# Patient Record
Sex: MALE | Race: BLACK OR AFRICAN AMERICAN | Employment: OTHER | URBAN - NONMETROPOLITAN AREA
[De-identification: names, ages, dates, MRNs, and addresses within clinical notes are randomized per-mention and may not be internally consistent; named-entity substitution may affect disease eponyms.]

---

## 2017-01-03 RX ORDER — OXYCODONE HYDROCHLORIDE AND ACETAMINOPHEN 5; 325 MG/1; MG/1
TABLET ORAL
Qty: 20 TABLET | Refills: 0 | OUTPATIENT
Start: 2017-01-03

## 2017-01-05 ENCOUNTER — OFFICE VISIT (OUTPATIENT)
Dept: CARDIOLOGY | Age: 52
End: 2017-01-05
Payer: MEDICARE

## 2017-01-05 VITALS
SYSTOLIC BLOOD PRESSURE: 118 MMHG | HEIGHT: 71 IN | BODY MASS INDEX: 44.1 KG/M2 | DIASTOLIC BLOOD PRESSURE: 84 MMHG | WEIGHT: 315 LBS | HEART RATE: 84 BPM

## 2017-01-05 DIAGNOSIS — R94.31 ABNORMAL EKG: Primary | ICD-10-CM

## 2017-01-05 DIAGNOSIS — E11.9 CONTROLLED TYPE 2 DIABETES MELLITUS WITHOUT COMPLICATION, WITHOUT LONG-TERM CURRENT USE OF INSULIN (HCC): ICD-10-CM

## 2017-01-05 DIAGNOSIS — I10 ESSENTIAL HYPERTENSION: ICD-10-CM

## 2017-01-05 DIAGNOSIS — Z01.818 PRE-OP EVALUATION: ICD-10-CM

## 2017-01-05 PROCEDURE — 99214 OFFICE O/P EST MOD 30 MIN: CPT | Performed by: CLINICAL NURSE SPECIALIST

## 2017-01-05 ASSESSMENT — ENCOUNTER SYMPTOMS
VOMITING: 0
SHORTNESS OF BREATH: 0
HEARTBURN: 0
NAUSEA: 0
ABDOMINAL PAIN: 0
BLURRED VISION: 0
ORTHOPNEA: 0
COUGH: 0

## 2017-01-09 ENCOUNTER — HOSPITAL ENCOUNTER (OUTPATIENT)
Dept: NON INVASIVE DIAGNOSTICS | Age: 52
Discharge: HOME | End: 2017-01-09
Payer: MEDICARE

## 2017-01-09 DIAGNOSIS — Z01.818 PRE-OP EVALUATION: ICD-10-CM

## 2017-01-09 DIAGNOSIS — R94.31 ABNORMAL EKG: ICD-10-CM

## 2017-01-09 PROCEDURE — 93350 STRESS TTE ONLY: CPT

## 2017-01-11 ENCOUNTER — TELEPHONE (OUTPATIENT)
Dept: SURGERY | Age: 52
End: 2017-01-11

## 2017-01-27 ENCOUNTER — ANESTHESIA (OUTPATIENT)
Dept: OPERATING ROOM | Age: 52
End: 2017-01-27
Payer: MEDICARE

## 2017-01-27 ENCOUNTER — HOSPITAL ENCOUNTER (OUTPATIENT)
Age: 52
Setting detail: OUTPATIENT SURGERY
Discharge: HOME | End: 2017-01-27
Attending: SURGERY | Admitting: SURGERY
Payer: MEDICARE

## 2017-01-27 ENCOUNTER — ANESTHESIA EVENT (OUTPATIENT)
Dept: OPERATING ROOM | Age: 52
End: 2017-01-27
Payer: MEDICARE

## 2017-01-27 ENCOUNTER — PREP FOR PROCEDURE (OUTPATIENT)
Dept: SURGERY | Age: 52
End: 2017-01-27

## 2017-01-27 VITALS
HEIGHT: 71 IN | BODY MASS INDEX: 43.68 KG/M2 | OXYGEN SATURATION: 100 % | HEART RATE: 88 BPM | TEMPERATURE: 97.8 F | SYSTOLIC BLOOD PRESSURE: 138 MMHG | RESPIRATION RATE: 16 BRPM | WEIGHT: 312 LBS | DIASTOLIC BLOOD PRESSURE: 72 MMHG

## 2017-01-27 VITALS
SYSTOLIC BLOOD PRESSURE: 104 MMHG | OXYGEN SATURATION: 100 % | TEMPERATURE: 97.6 F | DIASTOLIC BLOOD PRESSURE: 38 MMHG | RESPIRATION RATE: 3 BRPM

## 2017-01-27 LAB
GLUCOSE BLD-MCNC: 117 MG/DL (ref 70–99)
PERFORMED ON: ABNORMAL

## 2017-01-27 PROCEDURE — 3700000000 HC ANESTHESIA ATTENDED CARE: Performed by: SURGERY

## 2017-01-27 PROCEDURE — 6360000002 HC RX W HCPCS: Performed by: SURGERY

## 2017-01-27 PROCEDURE — 6370000000 HC RX 637 (ALT 250 FOR IP): Performed by: SURGERY

## 2017-01-27 PROCEDURE — 6360000002 HC RX W HCPCS: Performed by: NURSE ANESTHETIST, CERTIFIED REGISTERED

## 2017-01-27 PROCEDURE — 7100000000 HC PACU RECOVERY - FIRST 15 MIN: Performed by: SURGERY

## 2017-01-27 PROCEDURE — 3600000014 HC SURGERY LEVEL 4 ADDTL 15MIN: Performed by: SURGERY

## 2017-01-27 PROCEDURE — 49585 REPAIR UMBILICAL HERN,5+Y/O,REDUC: CPT | Performed by: SURGERY

## 2017-01-27 PROCEDURE — 2500000003 HC RX 250 WO HCPCS: Performed by: SURGERY

## 2017-01-27 PROCEDURE — 99999 PR OFFICE/OUTPT VISIT,PROCEDURE ONLY: CPT | Performed by: PHYSICIAN ASSISTANT

## 2017-01-27 PROCEDURE — 3700000001 HC ADD 15 MINUTES (ANESTHESIA): Performed by: SURGERY

## 2017-01-27 PROCEDURE — 7100000001 HC PACU RECOVERY - ADDTL 15 MIN: Performed by: SURGERY

## 2017-01-27 PROCEDURE — 3600000004 HC SURGERY LEVEL 4 BASE: Performed by: SURGERY

## 2017-01-27 PROCEDURE — 2720000003 HC MISC SUTURE/STAPLES/RELOADS/ETC: Performed by: SURGERY

## 2017-01-27 PROCEDURE — 2720000001 HC MISC SURG SUPPLY STERILE $51-500: Performed by: SURGERY

## 2017-01-27 PROCEDURE — 2580000003 HC RX 258: Performed by: SURGERY

## 2017-01-27 PROCEDURE — 7100000010 HC PHASE II RECOVERY - FIRST 15 MIN: Performed by: SURGERY

## 2017-01-27 PROCEDURE — 82948 REAGENT STRIP/BLOOD GLUCOSE: CPT

## 2017-01-27 PROCEDURE — 2500000003 HC RX 250 WO HCPCS: Performed by: NURSE ANESTHETIST, CERTIFIED REGISTERED

## 2017-01-27 PROCEDURE — 7100000011 HC PHASE II RECOVERY - ADDTL 15 MIN: Performed by: SURGERY

## 2017-01-27 PROCEDURE — C1781 MESH (IMPLANTABLE): HCPCS | Performed by: SURGERY

## 2017-01-27 DEVICE — MESH SURG W6.4XL6.4CM CIR VENTRAL PTCH FOR TISS SEPARATING: Type: IMPLANTABLE DEVICE | Site: ABDOMEN | Status: FUNCTIONAL

## 2017-01-27 RX ORDER — LIDOCAINE HYDROCHLORIDE 10 MG/ML
INJECTION, SOLUTION EPIDURAL; INFILTRATION; INTRACAUDAL; PERINEURAL PRN
Status: DISCONTINUED | OUTPATIENT
Start: 2017-01-27 | End: 2017-01-27 | Stop reason: SDUPTHER

## 2017-01-27 RX ORDER — MEPERIDINE HYDROCHLORIDE 25 MG/ML
12.5 INJECTION INTRAMUSCULAR; INTRAVENOUS; SUBCUTANEOUS EVERY 5 MIN PRN
Status: DISCONTINUED | OUTPATIENT
Start: 2017-01-27 | End: 2017-01-27 | Stop reason: HOSPADM

## 2017-01-27 RX ORDER — PROPOFOL 10 MG/ML
INJECTION, EMULSION INTRAVENOUS PRN
Status: DISCONTINUED | OUTPATIENT
Start: 2017-01-27 | End: 2017-01-27 | Stop reason: SDUPTHER

## 2017-01-27 RX ORDER — PROMETHAZINE HYDROCHLORIDE 25 MG/ML
6.25 INJECTION, SOLUTION INTRAMUSCULAR; INTRAVENOUS
Status: DISCONTINUED | OUTPATIENT
Start: 2017-01-27 | End: 2017-01-27 | Stop reason: HOSPADM

## 2017-01-27 RX ORDER — FENTANYL CITRATE 50 UG/ML
INJECTION, SOLUTION INTRAMUSCULAR; INTRAVENOUS PRN
Status: DISCONTINUED | OUTPATIENT
Start: 2017-01-27 | End: 2017-01-27 | Stop reason: SDUPTHER

## 2017-01-27 RX ORDER — ROCURONIUM BROMIDE 10 MG/ML
INJECTION, SOLUTION INTRAVENOUS PRN
Status: DISCONTINUED | OUTPATIENT
Start: 2017-01-27 | End: 2017-01-27 | Stop reason: SDUPTHER

## 2017-01-27 RX ORDER — MORPHINE SULFATE 4 MG/ML
2 INJECTION, SOLUTION INTRAMUSCULAR; INTRAVENOUS EVERY 5 MIN PRN
Status: DISCONTINUED | OUTPATIENT
Start: 2017-01-27 | End: 2017-01-27 | Stop reason: HOSPADM

## 2017-01-27 RX ORDER — HYDRALAZINE HYDROCHLORIDE 20 MG/ML
5 INJECTION INTRAMUSCULAR; INTRAVENOUS EVERY 10 MIN PRN
Status: DISCONTINUED | OUTPATIENT
Start: 2017-01-27 | End: 2017-01-27 | Stop reason: HOSPADM

## 2017-01-27 RX ORDER — METOCLOPRAMIDE HYDROCHLORIDE 5 MG/ML
10 INJECTION INTRAMUSCULAR; INTRAVENOUS
Status: DISCONTINUED | OUTPATIENT
Start: 2017-01-27 | End: 2017-01-27 | Stop reason: HOSPADM

## 2017-01-27 RX ORDER — SODIUM CHLORIDE 0.9 % (FLUSH) 0.9 %
10 SYRINGE (ML) INJECTION PRN
Status: DISCONTINUED | OUTPATIENT
Start: 2017-01-27 | End: 2017-01-27 | Stop reason: HOSPADM

## 2017-01-27 RX ORDER — DIPHENHYDRAMINE HYDROCHLORIDE 50 MG/ML
12.5 INJECTION INTRAMUSCULAR; INTRAVENOUS
Status: DISCONTINUED | OUTPATIENT
Start: 2017-01-27 | End: 2017-01-27 | Stop reason: HOSPADM

## 2017-01-27 RX ORDER — ONDANSETRON 2 MG/ML
INJECTION INTRAMUSCULAR; INTRAVENOUS PRN
Status: DISCONTINUED | OUTPATIENT
Start: 2017-01-27 | End: 2017-01-27 | Stop reason: SDUPTHER

## 2017-01-27 RX ORDER — SODIUM CHLORIDE 0.9 % (FLUSH) 0.9 %
10 SYRINGE (ML) INJECTION EVERY 12 HOURS SCHEDULED
Status: DISCONTINUED | OUTPATIENT
Start: 2017-01-27 | End: 2017-01-27 | Stop reason: HOSPADM

## 2017-01-27 RX ORDER — ACETAMINOPHEN 325 MG/1
650 TABLET ORAL EVERY 4 HOURS PRN
Status: DISCONTINUED | OUTPATIENT
Start: 2017-01-27 | End: 2017-01-27 | Stop reason: HOSPADM

## 2017-01-27 RX ORDER — MORPHINE SULFATE 4 MG/ML
4 INJECTION, SOLUTION INTRAMUSCULAR; INTRAVENOUS EVERY 5 MIN PRN
Status: DISCONTINUED | OUTPATIENT
Start: 2017-01-27 | End: 2017-01-27 | Stop reason: HOSPADM

## 2017-01-27 RX ORDER — SODIUM CHLORIDE, SODIUM LACTATE, POTASSIUM CHLORIDE, CALCIUM CHLORIDE 600; 310; 30; 20 MG/100ML; MG/100ML; MG/100ML; MG/100ML
INJECTION, SOLUTION INTRAVENOUS CONTINUOUS
Status: DISCONTINUED | OUTPATIENT
Start: 2017-01-27 | End: 2017-01-27 | Stop reason: HOSPADM

## 2017-01-27 RX ORDER — BUPIVACAINE HYDROCHLORIDE AND EPINEPHRINE 2.5; 5 MG/ML; UG/ML
INJECTION, SOLUTION INFILTRATION; PERINEURAL PRN
Status: DISCONTINUED | OUTPATIENT
Start: 2017-01-27 | End: 2017-01-27 | Stop reason: HOSPADM

## 2017-01-27 RX ORDER — ENALAPRILAT 2.5 MG/2ML
1.25 INJECTION INTRAVENOUS
Status: DISCONTINUED | OUTPATIENT
Start: 2017-01-27 | End: 2017-01-27 | Stop reason: HOSPADM

## 2017-01-27 RX ORDER — LABETALOL HYDROCHLORIDE 5 MG/ML
5 INJECTION, SOLUTION INTRAVENOUS EVERY 10 MIN PRN
Status: DISCONTINUED | OUTPATIENT
Start: 2017-01-27 | End: 2017-01-27 | Stop reason: HOSPADM

## 2017-01-27 RX ORDER — LIDOCAINE HYDROCHLORIDE 10 MG/ML
1 INJECTION, SOLUTION EPIDURAL; INFILTRATION; INTRACAUDAL; PERINEURAL ONCE
Status: COMPLETED | OUTPATIENT
Start: 2017-01-27 | End: 2017-01-27

## 2017-01-27 RX ORDER — OXYCODONE HYDROCHLORIDE AND ACETAMINOPHEN 5; 325 MG/1; MG/1
1 TABLET ORAL EVERY 4 HOURS PRN
Status: DISCONTINUED | OUTPATIENT
Start: 2017-01-27 | End: 2017-01-27 | Stop reason: HOSPADM

## 2017-01-27 RX ORDER — MIDAZOLAM HYDROCHLORIDE 1 MG/ML
INJECTION INTRAMUSCULAR; INTRAVENOUS PRN
Status: DISCONTINUED | OUTPATIENT
Start: 2017-01-27 | End: 2017-01-27 | Stop reason: SDUPTHER

## 2017-01-27 RX ORDER — OXYCODONE HYDROCHLORIDE AND ACETAMINOPHEN 5; 325 MG/1; MG/1
TABLET ORAL
Qty: 30 TABLET | Refills: 0 | Status: SHIPPED | OUTPATIENT
Start: 2017-01-27 | End: 2017-03-16 | Stop reason: SDUPTHER

## 2017-01-27 RX ORDER — OXYCODONE HYDROCHLORIDE AND ACETAMINOPHEN 5; 325 MG/1; MG/1
TABLET ORAL
Status: DISCONTINUED
Start: 2017-01-27 | End: 2017-01-27 | Stop reason: HOSPADM

## 2017-01-27 RX ORDER — OXYCODONE HYDROCHLORIDE AND ACETAMINOPHEN 5; 325 MG/1; MG/1
2 TABLET ORAL EVERY 4 HOURS PRN
Status: DISCONTINUED | OUTPATIENT
Start: 2017-01-27 | End: 2017-01-27 | Stop reason: HOSPADM

## 2017-01-27 RX ADMIN — ROCURONIUM BROMIDE 50 MG: 10 INJECTION INTRAVENOUS at 14:09

## 2017-01-27 RX ADMIN — LIDOCAINE HYDROCHLORIDE 50 MG: 10 INJECTION, SOLUTION EPIDURAL; INFILTRATION; INTRACAUDAL; PERINEURAL at 14:09

## 2017-01-27 RX ADMIN — HYDROMORPHONE HYDROCHLORIDE 0.5 MG: 1 INJECTION, SOLUTION INTRAMUSCULAR; INTRAVENOUS; SUBCUTANEOUS at 16:15

## 2017-01-27 RX ADMIN — ONDANSETRON HYDROCHLORIDE 4 MG: 2 INJECTION, SOLUTION INTRAVENOUS at 15:15

## 2017-01-27 RX ADMIN — SUGAMMADEX 300 MG: 100 INJECTION, SOLUTION INTRAVENOUS at 15:14

## 2017-01-27 RX ADMIN — FENTANYL CITRATE 100 MCG: 50 INJECTION INTRAMUSCULAR; INTRAVENOUS at 15:07

## 2017-01-27 RX ADMIN — HYDROMORPHONE HYDROCHLORIDE 0.5 MG: 1 INJECTION, SOLUTION INTRAMUSCULAR; INTRAVENOUS; SUBCUTANEOUS at 16:23

## 2017-01-27 RX ADMIN — SODIUM CHLORIDE, SODIUM LACTATE, POTASSIUM CHLORIDE, AND CALCIUM CHLORIDE: 600; 310; 30; 20 INJECTION, SOLUTION INTRAVENOUS at 14:40

## 2017-01-27 RX ADMIN — Medication 3 G: at 14:33

## 2017-01-27 RX ADMIN — FENTANYL CITRATE 50 MCG: 50 INJECTION INTRAMUSCULAR; INTRAVENOUS at 14:34

## 2017-01-27 RX ADMIN — LIDOCAINE HYDROCHLORIDE 1 ML: 10 INJECTION, SOLUTION EPIDURAL; INFILTRATION; INTRACAUDAL; PERINEURAL at 09:41

## 2017-01-27 RX ADMIN — PROPOFOL 200 MG: 10 INJECTION, EMULSION INTRAVENOUS at 14:09

## 2017-01-27 RX ADMIN — MIDAZOLAM HYDROCHLORIDE 2 MG: 1 INJECTION, SOLUTION INTRAMUSCULAR; INTRAVENOUS at 14:01

## 2017-01-27 RX ADMIN — FENTANYL CITRATE 100 MCG: 50 INJECTION INTRAMUSCULAR; INTRAVENOUS at 14:09

## 2017-01-27 RX ADMIN — OXYCODONE HYDROCHLORIDE AND ACETAMINOPHEN 2 TABLET: 5; 325 TABLET ORAL at 16:57

## 2017-01-27 RX ADMIN — SODIUM CHLORIDE, SODIUM LACTATE, POTASSIUM CHLORIDE, AND CALCIUM CHLORIDE: 600; 310; 30; 20 INJECTION, SOLUTION INTRAVENOUS at 13:55

## 2017-01-27 RX ADMIN — FENTANYL CITRATE 100 MCG: 50 INJECTION INTRAMUSCULAR; INTRAVENOUS at 14:46

## 2017-01-27 RX ADMIN — SODIUM CHLORIDE, SODIUM LACTATE, POTASSIUM CHLORIDE, AND CALCIUM CHLORIDE: 600; 310; 30; 20 INJECTION, SOLUTION INTRAVENOUS at 09:41

## 2017-01-27 ASSESSMENT — PAIN SCALES - GENERAL
PAINLEVEL_OUTOF10: 10
PAINLEVEL_OUTOF10: 5
PAINLEVEL_OUTOF10: 10
PAINLEVEL_OUTOF10: 9

## 2017-01-27 ASSESSMENT — PAIN DESCRIPTION - PROGRESSION: CLINICAL_PROGRESSION: GRADUALLY IMPROVING

## 2017-01-27 ASSESSMENT — PAIN - FUNCTIONAL ASSESSMENT: PAIN_FUNCTIONAL_ASSESSMENT: 0-10

## 2017-01-27 ASSESSMENT — PAIN DESCRIPTION - LOCATION: LOCATION: ABDOMEN

## 2017-01-27 ASSESSMENT — PAIN DESCRIPTION - PAIN TYPE: TYPE: SURGICAL PAIN

## 2017-02-16 ENCOUNTER — OFFICE VISIT (OUTPATIENT)
Dept: SURGERY | Age: 52
End: 2017-02-16

## 2017-02-16 VITALS
DIASTOLIC BLOOD PRESSURE: 86 MMHG | WEIGHT: 315 LBS | HEIGHT: 71 IN | TEMPERATURE: 98.1 F | BODY MASS INDEX: 44.1 KG/M2 | SYSTOLIC BLOOD PRESSURE: 138 MMHG

## 2017-02-16 DIAGNOSIS — Z87.19 S/P HERNIA REPAIR: Primary | ICD-10-CM

## 2017-02-16 DIAGNOSIS — Z98.890 S/P HERNIA REPAIR: Primary | ICD-10-CM

## 2017-02-16 PROCEDURE — 99024 POSTOP FOLLOW-UP VISIT: CPT | Performed by: SURGERY

## 2017-03-16 ENCOUNTER — OFFICE VISIT (OUTPATIENT)
Dept: SURGERY | Age: 52
End: 2017-03-16

## 2017-03-16 VITALS
TEMPERATURE: 98.1 F | SYSTOLIC BLOOD PRESSURE: 158 MMHG | DIASTOLIC BLOOD PRESSURE: 98 MMHG | BODY MASS INDEX: 44.1 KG/M2 | WEIGHT: 315 LBS | HEIGHT: 71 IN

## 2017-03-16 DIAGNOSIS — R10.30 LOWER ABDOMINAL PAIN: Primary | ICD-10-CM

## 2017-03-16 DIAGNOSIS — Z87.19 S/P HERNIA REPAIR: ICD-10-CM

## 2017-03-16 DIAGNOSIS — Z98.890 S/P HERNIA REPAIR: ICD-10-CM

## 2017-03-16 PROCEDURE — 99024 POSTOP FOLLOW-UP VISIT: CPT | Performed by: SURGERY

## 2017-03-16 RX ORDER — OXYCODONE HYDROCHLORIDE AND ACETAMINOPHEN 5; 325 MG/1; MG/1
TABLET ORAL
Qty: 20 TABLET | Refills: 0 | Status: SHIPPED | OUTPATIENT
Start: 2017-03-16 | End: 2017-10-05

## 2017-03-29 ENCOUNTER — TELEPHONE (OUTPATIENT)
Dept: SURGERY | Age: 52
End: 2017-03-29

## 2017-06-27 RX ORDER — CLONIDINE HYDROCHLORIDE 0.3 MG/1
TABLET ORAL
Qty: 180 TABLET | Refills: 5 | Status: SHIPPED | OUTPATIENT
Start: 2017-06-27

## 2017-09-19 ENCOUNTER — TELEPHONE (OUTPATIENT)
Dept: FAMILY MEDICINE CLINIC | Age: 52
End: 2017-09-19

## 2017-10-05 ENCOUNTER — OFFICE VISIT (OUTPATIENT)
Dept: FAMILY MEDICINE CLINIC | Age: 52
End: 2017-10-05
Payer: MEDICARE

## 2017-10-05 VITALS
HEART RATE: 77 BPM | BODY MASS INDEX: 44.1 KG/M2 | OXYGEN SATURATION: 97 % | WEIGHT: 315 LBS | SYSTOLIC BLOOD PRESSURE: 120 MMHG | HEIGHT: 71 IN | DIASTOLIC BLOOD PRESSURE: 88 MMHG

## 2017-10-05 DIAGNOSIS — Z00.00 ROUTINE GENERAL MEDICAL EXAMINATION AT A HEALTH CARE FACILITY: ICD-10-CM

## 2017-10-05 DIAGNOSIS — E66.01 MORBID OBESITY WITH BMI OF 40.0-44.9, ADULT (HCC): ICD-10-CM

## 2017-10-05 DIAGNOSIS — Z23 NEED FOR PNEUMOCOCCAL VACCINATION: Primary | ICD-10-CM

## 2017-10-05 PROCEDURE — G0438 PPPS, INITIAL VISIT: HCPCS | Performed by: NURSE PRACTITIONER

## 2017-10-05 PROCEDURE — G0009 ADMIN PNEUMOCOCCAL VACCINE: HCPCS | Performed by: NURSE PRACTITIONER

## 2017-10-05 PROCEDURE — 90732 PPSV23 VACC 2 YRS+ SUBQ/IM: CPT | Performed by: NURSE PRACTITIONER

## 2017-10-05 ASSESSMENT — ANXIETY QUESTIONNAIRES: GAD7 TOTAL SCORE: 5

## 2017-10-05 ASSESSMENT — LIFESTYLE VARIABLES: HOW OFTEN DO YOU HAVE A DRINK CONTAINING ALCOHOL: 0

## 2017-10-05 NOTE — PROGRESS NOTES
Medicare Annual Wellness Visit  Name: Mallory Shoemaker Date: 10/5/2017   MRN: [id number] Sex: Male   Age: 50 y.o. Ethnicity: Non-/Non    : 1965 Race: Ty Acevedo is here for Medicare AWV    Screenings for behavioral, psychosocial and functional/safety risks, and cognitive dysfunction are all negative except as indicated below. These results, as well as other patient data from the [address] form, are documented in Flowsheets linked to this Encounter. Allergies   Allergen Reactions    Lortab [Hydrocodone-Acetaminophen] Itching     Prior to Visit Medications    Medication Sig Taking?  Authorizing Provider   cloNIDine (CATAPRES) 0.3 MG tablet TAKE THREE TABLETS BY MOUTH TWICE A DAY Yes WILMAN Swenson   ALPRAZolam (XANAX) 1 MG tablet Take 1 mg by mouth nightly as needed for Sleep Yes Historical Provider, MD   amitriptyline (ELAVIL) 50 MG tablet Take 50 mg by mouth nightly Yes Historical Provider, MD   colchicine (COLCRYS) 0.6 MG tablet Take 0.6 mg by mouth daily as needed Indications: Gout  Yes Historical Provider, MD   diclofenac (VOLTAREN) 75 MG EC tablet Take 75 mg by mouth 2 times daily Indications: Arthritis  Yes Historical Provider, MD   DULoxetine (CYMBALTA) 60 MG extended release capsule Take 60 mg by mouth daily Indications: Depression  Yes Historical Provider, MD   hydrOXYzine (VISTARIL) 25 MG capsule Take 25 mg by mouth 3 times daily as needed for Itching Yes Historical Provider, MD   metFORMIN (GLUCOPHAGE) 1000 MG tablet Take 1,000 mg by mouth 2 times daily (with meals) Indications: Diabetes  Yes Historical Provider, MD   prazosin (MINIPRESS) 2 MG capsule Take 2 mg by mouth nightly Indications: Frightening Dreams  Yes Historical Provider, MD   traZODone (DESYREL) 100 MG tablet Take 100 mg by mouth nightly Indications: Disturbed Sleep  Yes Historical Provider, MD   atenolol (TENORMIN) 100 MG tablet Take 1 tablet by mouth 2 times daily NEEDS APPOINTMENT  Patient taking differently: Take 100 mg by mouth 2 times daily Indications: High Blood Pressure NEEDS APPOINTMENT Yes Amanda Arceo MD   amLODIPine (NORVASC) 5 MG tablet TAKE ONE TABLET BY MOUTH EVERY DAY . .......... Ozzy Ryan MD   simvastatin (ZOCOR) 40 MG tablet Take 1 tablet by mouth every evening. Patient taking differently: Take 40 mg by mouth every evening Indications: Changes in Cholesterol  Yes WILMAN Moreno   glucose monitoring kit (FREESTYLE) monitoring kit 1 kit by Does not apply route daily as needed. DX:250.00 Yes WILMAN Moreno   glucose blood VI test strips (EXACTECH TEST) strip 1 each by In Vitro route daily. Test sugars daily and prn DX:250.00 Yes WILMAN Moreno   Lancets MISC DX: 250.00  Test sugar daily and prn Yes WILMAN Moreno   ibuprofen (ADVIL;MOTRIN) 800 MG tablet TAKE ONE TABLET BY MOUTH EVERY 8 HOURS AS NEEDED FOR PAIN  WILMAN Moreno   metFORMIN (GLUCOPHAGE) 500 MG tablet Take 1 tablet by mouth 2 times daily for 30 days.   WILMAN Moreno     Past Medical History:   Diagnosis Date    Anxiety     Arthritis     Chronic back pain     Controlled type 2 diabetes mellitus without complication, without long-term current use of insulin ([address] 1/5/2017    Depression     Diabetes mellitus ([address]     Gout     Hyperlipidemia     Hypertension     Obesity     Umbilical hernia      Past Surgical History:   Procedure Laterality Date    BACK SURGERY      COLONOSCOPY      LUMBAR [address] SURGERY  2008    OTHER SURGICAL HISTORY      cat scratch fever in left groin    REPAIR UMBILICAL NTZI,9+K/C,LIIZH N/A 1/75/7421    HERNIA UMBILICAL REPAIR WITH MESH  performed by [facility]yer [facility] at [facility]    UPPER GASTROINTESTINAL ENDOSCOPY       Family History   Problem Relation Age of Onset    Diabetes Mother     High Blood Pressure Mother     Cancer Father        CareTeam (Including had an eye exam within the past year?: Yes  Hearing/Vision Interventions:  · None indicated    Safety  Do you have working smoke detectors?: Yes  Have all throw rugs been removed or fastened?: (!) No  Do you have non-slip mats in all bathtubs?: Yes  Do all of your stairways have a railing or banister?: Yes  Are your doorways, halls and stairs free of clutter?: Yes  Do you always fasten your seatbelt when you are in a car?: Yes  Safety Interventions:  · None indicated    ADLs  In the past 7 days, did you need help from others to perform any of the following everyday activities?: None  In the past 7 days, did you need help from others to take care of any of the following?: None  ADL Interventions:  · None indicated    Personalized Preventive Plan   Current Health Maintenance Status  Immunization History   Administered Date(s) Administered    Pneumococcal Polysaccharide ([id number]) 10/05/2017        Health Maintenance   Topic Date Due    Diabetic foot exam  09/30/1977    Diabetic retinal exam  09/30/1977    HIV screen  09/30/1982    DTaP/Tdap/Td vaccine (1 - Tdap) 09/30/1986    Diabetic microalbuminuria test  02/09/2012    Flu vaccine (1) 09/01/2017    Colon cancer screen colonoscopy  09/30/2017    Diabetic hemoglobin A1C test  12/12/2017    Lipid screen  12/12/2017    Pneumococcal med risk  Completed     Recommendations for Preventive Services Due: see orders.   Recommended screening schedule for the next 5-10 years is provided to the patient in written form: see Patient Instructions/AVS.

## 2017-10-05 NOTE — PATIENT INSTRUCTIONS
Personalized Preventive Plan for Dionna Thurman - 10/5/2017  Medicare offers a range of preventive health benefits. Some of the tests and screenings are paid in full while other may be subject to a deductible, co-insurance, and/or copay. Some of these benefits include a comprehensive review of your medical history including lifestyle, illnesses that may run in your family, and various assessments and screenings as appropriate. After reviewing your medical record and screening and assessments performed today your provider may have ordered immunizations, labs, imaging, and/or referrals for you. A list of these orders (if applicable) as well as your Preventive Care list are included within your After Visit Summary for your review. Other Preventive Recommendations:    · A preventive eye exam performed by an eye specialist is recommended every 1-2 years to screen for glaucoma; cataracts, macular degeneration, and other eye disorders. · A preventive dental visit is recommended every 6 months. · Try to get at least 150 minutes of exercise per week or 10,000 steps per day on a pedometer . · Order or download the FREE \"Exercise & Physical Activity: Your Everyday Guide\" from [employer]. Call 7-619.338.8463 or search [employer] online. · You need 4014-6505 mg of calcium and 4069-9363 IU of vitamin D per day. It is possible to meet your calcium requirement with diet alone, but a vitamin D supplement is usually necessary to meet this goal.  · When exposed to the sun, use a sunscreen that protects against both UVA and UVB radiation with an SPF of 30 or greater. Reapply every 2 to 3 hours or after sweating, drying off with a towel, or swimming. · Always wear a seat belt when traveling in a car. Always wear a helmet when riding a bicycle or motorcycle.

## 2018-06-15 ENCOUNTER — HOSPITAL ENCOUNTER (EMERGENCY)
Age: 53
Discharge: HOME | End: 2018-06-16
Payer: MEDICARE

## 2018-06-15 VITALS
OXYGEN SATURATION: 95 % | HEIGHT: 71 IN | HEART RATE: 80 BPM | TEMPERATURE: 98.1 F | DIASTOLIC BLOOD PRESSURE: 102 MMHG | BODY MASS INDEX: 44.1 KG/M2 | WEIGHT: 315 LBS | RESPIRATION RATE: 18 BRPM | SYSTOLIC BLOOD PRESSURE: 181 MMHG

## 2018-06-15 DIAGNOSIS — K02.9 PAIN DUE TO DENTAL CARIES: Primary | ICD-10-CM

## 2018-06-15 PROCEDURE — 99282 EMERGENCY DEPT VISIT SF MDM: CPT

## 2018-06-15 ASSESSMENT — PAIN DESCRIPTION - ORIENTATION: ORIENTATION: RIGHT;LOWER

## 2018-06-15 ASSESSMENT — PAIN SCALES - GENERAL: PAINLEVEL_OUTOF10: 8

## 2018-06-15 ASSESSMENT — PAIN DESCRIPTION - LOCATION: LOCATION: TEETH

## 2018-06-16 PROCEDURE — 99283 EMERGENCY DEPT VISIT LOW MDM: CPT | Performed by: NURSE PRACTITIONER

## 2018-06-16 RX ORDER — OXYCODONE HYDROCHLORIDE AND ACETAMINOPHEN 5; 325 MG/1; MG/1
1 TABLET ORAL EVERY 6 HOURS PRN
Qty: 12 TABLET | Refills: 0 | Status: SHIPPED | OUTPATIENT
Start: 2018-06-15 | End: 2018-06-18

## 2018-06-16 RX ORDER — AMOXICILLIN 500 MG/1
500 CAPSULE ORAL 3 TIMES DAILY
Qty: 30 CAPSULE | Refills: 0 | Status: SHIPPED | OUTPATIENT
Start: 2018-06-15 | End: 2018-06-25

## 2018-06-16 ASSESSMENT — ENCOUNTER SYMPTOMS
FACIAL SWELLING: 0
VOMITING: 0
TRISMUS: 0

## 2019-02-28 ENCOUNTER — APPOINTMENT (OUTPATIENT)
Dept: GENERAL RADIOLOGY | Age: 54
End: 2019-02-28
Payer: MEDICARE

## 2019-02-28 ENCOUNTER — HOSPITAL ENCOUNTER (EMERGENCY)
Age: 54
Discharge: HOME | End: 2019-03-01
Attending: EMERGENCY MEDICINE
Payer: MEDICARE

## 2019-02-28 DIAGNOSIS — R07.9 CHEST PAIN, UNSPECIFIED TYPE: Primary | ICD-10-CM

## 2019-02-28 LAB
ALBUMIN SERPL-MCNC: 4.4 G/DL (ref 3.5–5.2)
ALP BLD-CCNC: 126 U/L (ref 40–130)
ALT SERPL-CCNC: 34 U/L (ref 5–41)
ANION GAP SERPL CALCULATED.3IONS-SCNC: 15 MMOL/L (ref 7–19)
APTT: 26.3 SEC (ref 26–36.2)
AST SERPL-CCNC: 40 U/L (ref 5–40)
BASOPHILS ABSOLUTE: 0 K/UL (ref 0–0.2)
BASOPHILS RELATIVE PERCENT: 0.2 % (ref 0–1)
BILIRUB SERPL-MCNC: 0.3 MG/DL (ref 0.2–1.2)
BUN BLDV-MCNC: 21 MG/DL (ref 6–20)
CALCIUM SERPL-MCNC: 9.6 MG/DL (ref 8.6–10)
CHLORIDE BLD-SCNC: 97 MMOL/L (ref 98–111)
CO2: 26 MMOL/L (ref 22–29)
CREAT SERPL-MCNC: 1.3 MG/DL (ref 0.5–1.2)
EOSINOPHILS ABSOLUTE: 0.1 K/UL (ref 0–0.6)
EOSINOPHILS RELATIVE PERCENT: 1.6 % (ref 0–5)
GFR NON-AFRICAN AMERICAN: 58
GLUCOSE BLD-MCNC: 95 MG/DL (ref 74–109)
HCT VFR BLD CALC: 44.9 % (ref 42–52)
HEMOGLOBIN: 14.6 G/DL (ref 14–18)
INR BLD: 1.01 (ref 0.88–1.18)
LYMPHOCYTES ABSOLUTE: 1.1 K/UL (ref 1.1–4.5)
LYMPHOCYTES RELATIVE PERCENT: 20.5 % (ref 20–40)
MCH RBC QN AUTO: 28 PG (ref 27–31)
MCHC RBC AUTO-ENTMCNC: 32.5 G/DL (ref 33–37)
MCV RBC AUTO: 86.2 FL (ref 80–94)
MONOCYTES ABSOLUTE: 0.4 K/UL (ref 0–0.9)
MONOCYTES RELATIVE PERCENT: 7.2 % (ref 0–10)
NEUTROPHILS ABSOLUTE: 3.9 K/UL (ref 1.5–7.5)
NEUTROPHILS RELATIVE PERCENT: 70.3 % (ref 50–65)
PDW BLD-RTO: 15.1 % (ref 11.5–14.5)
PLATELET # BLD: 224 K/UL (ref 130–400)
PMV BLD AUTO: 9.9 FL (ref 9.4–12.4)
POTASSIUM SERPL-SCNC: 4 MMOL/L (ref 3.5–5)
PROTHROMBIN TIME: 12.7 SEC (ref 12–14.6)
RBC # BLD: 5.21 M/UL (ref 4.7–6.1)
SODIUM BLD-SCNC: 138 MMOL/L (ref 136–145)
TOTAL CK: 426 U/L (ref 39–308)
TOTAL PROTEIN: 8.1 G/DL (ref 6.6–8.7)
TROPONIN: <0.01 NG/ML (ref 0–0.03)
WBC # BLD: 5.5 K/UL (ref 4.8–10.8)

## 2019-02-28 PROCEDURE — 85610 PROTHROMBIN TIME: CPT

## 2019-02-28 PROCEDURE — 80053 COMPREHEN METABOLIC PANEL: CPT

## 2019-02-28 PROCEDURE — 71046 X-RAY EXAM CHEST 2 VIEWS: CPT

## 2019-02-28 PROCEDURE — 2580000003 HC RX 258: Performed by: EMERGENCY MEDICINE

## 2019-02-28 PROCEDURE — 93005 ELECTROCARDIOGRAM TRACING: CPT

## 2019-02-28 PROCEDURE — 99285 EMERGENCY DEPT VISIT HI MDM: CPT

## 2019-02-28 PROCEDURE — 85730 THROMBOPLASTIN TIME PARTIAL: CPT

## 2019-02-28 PROCEDURE — 36415 COLL VENOUS BLD VENIPUNCTURE: CPT

## 2019-02-28 PROCEDURE — 84484 ASSAY OF TROPONIN QUANT: CPT

## 2019-02-28 PROCEDURE — 82550 ASSAY OF CK (CPK): CPT

## 2019-02-28 PROCEDURE — 85025 COMPLETE CBC W/AUTO DIFF WBC: CPT

## 2019-02-28 PROCEDURE — 6370000000 HC RX 637 (ALT 250 FOR IP): Performed by: EMERGENCY MEDICINE

## 2019-02-28 RX ORDER — SODIUM CHLORIDE 9 MG/ML
INJECTION, SOLUTION INTRAVENOUS CONTINUOUS
Status: DISCONTINUED | OUTPATIENT
Start: 2019-02-28 | End: 2019-03-01 | Stop reason: HOSPADM

## 2019-02-28 RX ORDER — ASPIRIN 325 MG
325 TABLET ORAL ONCE
Status: COMPLETED | OUTPATIENT
Start: 2019-02-28 | End: 2019-02-28

## 2019-02-28 RX ORDER — NITROGLYCERIN 0.4 MG/1
0.4 TABLET SUBLINGUAL EVERY 5 MIN PRN
Status: DISCONTINUED | OUTPATIENT
Start: 2019-02-28 | End: 2019-03-01 | Stop reason: HOSPADM

## 2019-02-28 RX ADMIN — SODIUM CHLORIDE: 9 INJECTION, SOLUTION INTRAVENOUS at 22:08

## 2019-02-28 RX ADMIN — ASPIRIN 325 MG ORAL TABLET 325 MG: 325 PILL ORAL at 22:08

## 2019-02-28 RX ADMIN — NITROGLYCERIN 0.4 MG: 0.4 TABLET, ORALLY DISINTEGRATING SUBLINGUAL at 22:08

## 2019-02-28 RX ADMIN — NITROGLYCERIN 0.4 MG: 0.4 TABLET, ORALLY DISINTEGRATING SUBLINGUAL at 22:28

## 2019-02-28 ASSESSMENT — PAIN DESCRIPTION - DIRECTION: RADIATING_TOWARDS: BACK

## 2019-02-28 ASSESSMENT — PAIN DESCRIPTION - ORIENTATION: ORIENTATION: LEFT

## 2019-02-28 ASSESSMENT — PAIN DESCRIPTION - LOCATION: LOCATION: CHEST

## 2019-02-28 ASSESSMENT — PAIN DESCRIPTION - PAIN TYPE: TYPE: ACUTE PAIN

## 2019-02-28 ASSESSMENT — PAIN SCALES - GENERAL: PAINLEVEL_OUTOF10: 6

## 2019-03-01 VITALS
HEART RATE: 81 BPM | SYSTOLIC BLOOD PRESSURE: 146 MMHG | DIASTOLIC BLOOD PRESSURE: 101 MMHG | RESPIRATION RATE: 10 BRPM | TEMPERATURE: 98.1 F | OXYGEN SATURATION: 97 %

## 2019-03-01 LAB
EKG P AXIS: 34 DEGREES
EKG P AXIS: 53 DEGREES
EKG P-R INTERVAL: 186 MS
EKG P-R INTERVAL: 188 MS
EKG Q-T INTERVAL: 394 MS
EKG Q-T INTERVAL: 400 MS
EKG QRS DURATION: 90 MS
EKG QRS DURATION: 92 MS
EKG QTC CALCULATION (BAZETT): 424 MS
EKG QTC CALCULATION (BAZETT): 425 MS
EKG T AXIS: 18 DEGREES
EKG T AXIS: 24 DEGREES
TROPONIN: <0.01 NG/ML (ref 0–0.03)

## 2019-03-01 PROCEDURE — 99285 EMERGENCY DEPT VISIT HI MDM: CPT | Performed by: EMERGENCY MEDICINE

## 2019-03-01 PROCEDURE — 84484 ASSAY OF TROPONIN QUANT: CPT

## 2019-03-01 PROCEDURE — 36415 COLL VENOUS BLD VENIPUNCTURE: CPT

## 2019-03-01 ASSESSMENT — ENCOUNTER SYMPTOMS
SINUS PRESSURE: 0
COLOR CHANGE: 0
DIARRHEA: 0
VOMITING: 1
PHOTOPHOBIA: 0
CONSTIPATION: 0
SHORTNESS OF BREATH: 1
BACK PAIN: 0
WHEEZING: 0
EYE PAIN: 0
NAUSEA: 1
CHEST TIGHTNESS: 0
ABDOMINAL PAIN: 0
TROUBLE SWALLOWING: 0

## 2019-05-13 ENCOUNTER — HOSPITAL ENCOUNTER (OUTPATIENT)
Age: 54
Setting detail: SPECIMEN
Discharge: HOME | End: 2019-05-13
Payer: COMMERCIAL

## 2019-05-13 ENCOUNTER — APPOINTMENT (OUTPATIENT)
Dept: GENERAL RADIOLOGY | Age: 54
End: 2019-05-13
Payer: MEDICARE

## 2019-05-13 ENCOUNTER — HOSPITAL ENCOUNTER (EMERGENCY)
Age: 54
Discharge: HOME | End: 2019-05-13
Payer: MEDICARE

## 2019-05-13 VITALS
OXYGEN SATURATION: 94 % | SYSTOLIC BLOOD PRESSURE: 170 MMHG | RESPIRATION RATE: 16 BRPM | DIASTOLIC BLOOD PRESSURE: 92 MMHG | WEIGHT: 270 LBS | BODY MASS INDEX: 37.8 KG/M2 | HEIGHT: 71 IN | HEART RATE: 85 BPM | TEMPERATURE: 98.1 F

## 2019-05-13 DIAGNOSIS — M25.561 ACUTE PAIN OF RIGHT KNEE: ICD-10-CM

## 2019-05-13 DIAGNOSIS — M25.461 KNEE EFFUSION, RIGHT: Primary | ICD-10-CM

## 2019-05-13 DIAGNOSIS — Z87.39 HISTORY OF ACUTE GOUTY ARTHRITIS: ICD-10-CM

## 2019-05-13 LAB
ALBUMIN SERPL-MCNC: 4.6 G/DL (ref 3.5–5.2)
ALP BLD-CCNC: 116 U/L (ref 40–130)
ALT SERPL-CCNC: 19 U/L (ref 5–41)
ANION GAP SERPL CALCULATED.3IONS-SCNC: 11 MMOL/L (ref 7–19)
APPEARANCE FLUID: NORMAL
AST SERPL-CCNC: 21 U/L (ref 5–40)
BASOPHILS ABSOLUTE: 0 K/UL (ref 0–0.2)
BASOPHILS RELATIVE PERCENT: 0.3 % (ref 0–1)
BILIRUB SERPL-MCNC: <0.2 MG/DL (ref 0.2–1.2)
BUN BLDV-MCNC: 14 MG/DL (ref 6–20)
C-REACTIVE PROTEIN: 1.36 MG/DL (ref 0–0.5)
CALCIUM SERPL-MCNC: 9.5 MG/DL (ref 8.6–10)
CELL COUNT FLUID TYPE: NORMAL
CHLORIDE BLD-SCNC: 100 MMOL/L (ref 98–111)
CLOT EVALUATION: NORMAL
CO2: 26 MMOL/L (ref 22–29)
COLOR FLUID: NORMAL
CREAT SERPL-MCNC: 1 MG/DL (ref 0.5–1.2)
EOSINOPHILS ABSOLUTE: 0.1 K/UL (ref 0–0.6)
EOSINOPHILS RELATIVE PERCENT: 1.3 % (ref 0–5)
GFR NON-AFRICAN AMERICAN: >60
GLUCOSE BLD-MCNC: 111 MG/DL (ref 74–109)
HCT VFR BLD CALC: 42.3 % (ref 42–52)
HEMOGLOBIN: 14 G/DL (ref 14–18)
LYMPHOCYTES ABSOLUTE: 1.6 K/UL (ref 1.1–4.5)
LYMPHOCYTES RELATIVE PERCENT: 18.9 % (ref 20–40)
LYMPHOCYTES, BODY FLUID: 18 %
MCH RBC QN AUTO: 29.2 PG (ref 27–31)
MCHC RBC AUTO-ENTMCNC: 33.1 G/DL (ref 33–37)
MCV RBC AUTO: 88.3 FL (ref 80–94)
MONOCYTE, FLUID: 2 %
MONOCYTES ABSOLUTE: 0.3 K/UL (ref 0–0.9)
MONOCYTES RELATIVE PERCENT: 3.4 % (ref 0–10)
NEUTROPHIL, FLUID: 81 %
NEUTROPHILS ABSOLUTE: 6.6 K/UL (ref 1.5–7.5)
NEUTROPHILS RELATIVE PERCENT: 75.9 % (ref 50–65)
NUCLEATED CELLS FLUID: 1576 /CUMM
NUMBER OF CELLS COUNTED FLUID: 100
PDW BLD-RTO: 16.1 % (ref 11.5–14.5)
PLATELET # BLD: 245 K/UL (ref 130–400)
PMV BLD AUTO: 9.5 FL (ref 9.4–12.4)
POTASSIUM SERPL-SCNC: 4.5 MMOL/L (ref 3.5–5)
RBC # BLD: 4.79 M/UL (ref 4.7–6.1)
RBC FLUID: 858 /CUMM
SEDIMENTATION RATE, ERYTHROCYTE: 12 MM/HR (ref 0–15)
SODIUM BLD-SCNC: 137 MMOL/L (ref 136–145)
TOTAL PROTEIN: 8.1 G/DL (ref 6.6–8.7)
URIC ACID, SERUM: 8.7 MG/DL (ref 3.4–7)
WBC # BLD: 8.6 K/UL (ref 4.8–10.8)

## 2019-05-13 PROCEDURE — 87015 SPECIMEN INFECT AGNT CONCNTJ: CPT

## 2019-05-13 PROCEDURE — 87205 SMEAR GRAM STAIN: CPT

## 2019-05-13 PROCEDURE — 96375 TX/PRO/DX INJ NEW DRUG ADDON: CPT

## 2019-05-13 PROCEDURE — 6360000002 HC RX W HCPCS: Performed by: NURSE PRACTITIONER

## 2019-05-13 PROCEDURE — 93971 EXTREMITY STUDY: CPT

## 2019-05-13 PROCEDURE — 36415 COLL VENOUS BLD VENIPUNCTURE: CPT

## 2019-05-13 PROCEDURE — 89051 BODY FLUID CELL COUNT: CPT

## 2019-05-13 PROCEDURE — 73560 X-RAY EXAM OF KNEE 1 OR 2: CPT

## 2019-05-13 PROCEDURE — 87070 CULTURE OTHR SPECIMN AEROBIC: CPT

## 2019-05-13 PROCEDURE — 2500000003 HC RX 250 WO HCPCS: Performed by: NURSE PRACTITIONER

## 2019-05-13 PROCEDURE — 84550 ASSAY OF BLOOD/URIC ACID: CPT

## 2019-05-13 PROCEDURE — 89060 EXAM SYNOVIAL FLUID CRYSTALS: CPT

## 2019-05-13 PROCEDURE — 6370000000 HC RX 637 (ALT 250 FOR IP): Performed by: NURSE PRACTITIONER

## 2019-05-13 PROCEDURE — 80053 COMPREHEN METABOLIC PANEL: CPT

## 2019-05-13 PROCEDURE — 20610 DRAIN/INJ JOINT/BURSA W/O US: CPT

## 2019-05-13 PROCEDURE — 99284 EMERGENCY DEPT VISIT MOD MDM: CPT

## 2019-05-13 PROCEDURE — 96372 THER/PROPH/DIAG INJ SC/IM: CPT

## 2019-05-13 PROCEDURE — 96376 TX/PRO/DX INJ SAME DRUG ADON: CPT

## 2019-05-13 PROCEDURE — 85025 COMPLETE CBC W/AUTO DIFF WBC: CPT

## 2019-05-13 PROCEDURE — 20610 DRAIN/INJ JOINT/BURSA W/O US: CPT | Performed by: NURSE PRACTITIONER

## 2019-05-13 PROCEDURE — 96374 THER/PROPH/DIAG INJ IV PUSH: CPT

## 2019-05-13 PROCEDURE — 86140 C-REACTIVE PROTEIN: CPT

## 2019-05-13 PROCEDURE — 87075 CULTR BACTERIA EXCEPT BLOOD: CPT

## 2019-05-13 PROCEDURE — 85652 RBC SED RATE AUTOMATED: CPT

## 2019-05-13 PROCEDURE — 99283 EMERGENCY DEPT VISIT LOW MDM: CPT | Performed by: NURSE PRACTITIONER

## 2019-05-13 RX ORDER — MORPHINE SULFATE 2 MG/ML
4 INJECTION, SOLUTION INTRAMUSCULAR; INTRAVENOUS ONCE
Status: COMPLETED | OUTPATIENT
Start: 2019-05-13 | End: 2019-05-13

## 2019-05-13 RX ORDER — ORPHENADRINE CITRATE 30 MG/ML
60 INJECTION INTRAMUSCULAR; INTRAVENOUS ONCE
Status: DISCONTINUED | OUTPATIENT
Start: 2019-05-13 | End: 2019-05-13

## 2019-05-13 RX ORDER — LIDOCAINE HYDROCHLORIDE 10 MG/ML
5 INJECTION, SOLUTION EPIDURAL; INFILTRATION; INTRACAUDAL; PERINEURAL ONCE
Status: COMPLETED | OUTPATIENT
Start: 2019-05-13 | End: 2019-05-13

## 2019-05-13 RX ORDER — PROMETHAZINE HYDROCHLORIDE 25 MG/ML
6.25 INJECTION, SOLUTION INTRAMUSCULAR; INTRAVENOUS ONCE
Status: COMPLETED | OUTPATIENT
Start: 2019-05-13 | End: 2019-05-13

## 2019-05-13 RX ORDER — BUPIVACAINE HYDROCHLORIDE 5 MG/ML
30 INJECTION, SOLUTION EPIDURAL; INTRACAUDAL ONCE
Status: COMPLETED | OUTPATIENT
Start: 2019-05-13 | End: 2019-05-13

## 2019-05-13 RX ORDER — OXYCODONE AND ACETAMINOPHEN 10; 325 MG/1; MG/1
1 TABLET ORAL ONCE
Status: COMPLETED | OUTPATIENT
Start: 2019-05-13 | End: 2019-05-13

## 2019-05-13 RX ORDER — OXYCODONE AND ACETAMINOPHEN 10; 325 MG/1; MG/1
1 TABLET ORAL EVERY 6 HOURS PRN
Qty: 12 TABLET | Refills: 0 | Status: SHIPPED | OUTPATIENT
Start: 2019-05-13 | End: 2019-05-16

## 2019-05-13 RX ORDER — DEXAMETHASONE SODIUM PHOSPHATE 10 MG/ML
10 INJECTION, SOLUTION INTRAMUSCULAR; INTRAVENOUS ONCE
Status: COMPLETED | OUTPATIENT
Start: 2019-05-13 | End: 2019-05-13

## 2019-05-13 RX ORDER — MORPHINE SULFATE 10 MG/ML
4 INJECTION, SOLUTION INTRAMUSCULAR; INTRAVENOUS ONCE
Status: DISCONTINUED | OUTPATIENT
Start: 2019-05-13 | End: 2019-05-13

## 2019-05-13 RX ADMIN — PROMETHAZINE HYDROCHLORIDE 6.25 MG: 25 INJECTION INTRAMUSCULAR; INTRAVENOUS at 17:32

## 2019-05-13 RX ADMIN — DEXAMETHASONE SODIUM PHOSPHATE 10 MG: 10 INJECTION INTRAMUSCULAR; INTRAVENOUS at 15:04

## 2019-05-13 RX ADMIN — LIDOCAINE HYDROCHLORIDE 5 ML: 10 INJECTION, SOLUTION EPIDURAL; INFILTRATION; INTRACAUDAL at 17:30

## 2019-05-13 RX ADMIN — MORPHINE SULFATE 4 MG: 2 INJECTION, SOLUTION INTRAMUSCULAR; INTRAVENOUS at 17:32

## 2019-05-13 RX ADMIN — MORPHINE SULFATE 4 MG: 2 INJECTION, SOLUTION INTRAMUSCULAR; INTRAVENOUS at 19:49

## 2019-05-13 RX ADMIN — PROMETHAZINE HYDROCHLORIDE 6.25 MG: 25 INJECTION INTRAMUSCULAR; INTRAVENOUS at 19:49

## 2019-05-13 RX ADMIN — OXYCODONE AND ACETAMINOPHEN 1 TABLET: 10; 325 TABLET ORAL at 15:04

## 2019-05-13 RX ADMIN — BUPIVACAINE HYDROCHLORIDE 150 MG: 5 INJECTION, SOLUTION EPIDURAL; INTRACAUDAL; PERINEURAL at 17:30

## 2019-05-13 ASSESSMENT — PAIN SCALES - GENERAL
PAINLEVEL_OUTOF10: 10
PAINLEVEL_OUTOF10: 5
PAINLEVEL_OUTOF10: 10
PAINLEVEL_OUTOF10: 9
PAINLEVEL_OUTOF10: 10
PAINLEVEL_OUTOF10: 9

## 2019-05-13 ASSESSMENT — ENCOUNTER SYMPTOMS: BACK PAIN: 0

## 2019-05-13 NOTE — ED PROVIDER NOTES
 Hyperlipidemia     Hypertension     Obesity     Umbilical hernia          SURGICAL HISTORY       Past Surgical History:   Procedure Laterality Date    BACK SURGERY      COLONOSCOPY      LUMBAR [address] SURGERY  2008    OTHER SURGICAL HISTORY      cat scratch fever in left groin    REPAIR UMBILICAL ODYD,2+U/Q,UFZUV N/A 5/53/4966    HERNIA UMBILICAL REPAIR WITH MESH  performed by Berto Nxi MD at [place] [address].       Discharge Medication List as of 5/13/2019  8:28 PM      CONTINUE these medications which have NOT CHANGED    Details   cloNIDine (CATAPRES) 0.3 MG tablet TAKE THREE TABLETS BY MOUTH TWICE A DAY, Disp-180 tablet, R-5Normal      amitriptyline (ELAVIL) 50 MG tablet Take 50 mg by mouth nightly      colchicine (COLCRYS) 0.6 MG tablet Take 0.6 mg by mouth daily as needed Indications: Gout       diclofenac (VOLTAREN) 75 MG EC tablet Take 75 mg by mouth 2 times daily Indications: Arthritis       DULoxetine (CYMBALTA) 60 MG extended release capsule Take 60 mg by mouth daily Indications: Depression       hydrOXYzine (VISTARIL) 25 MG capsule Take 25 mg by mouth 3 times daily as needed for Itching      metFORMIN (GLUCOPHAGE) 1000 MG tablet Take 1,000 mg by mouth 2 times daily (with meals) Indications: Diabetes       prazosin (MINIPRESS) 2 MG capsule Take 2 mg by mouth nightly Indications: Frightening Dreams       traZODone (DESYREL) 100 MG tablet Take 100 mg by mouth nightly Indications: Disturbed Sleep       atenolol (TENORMIN) 100 MG tablet Take 1 tablet by mouth 2 times daily NEEDS APPOINTMENT, Disp-60 tablet, R-0      amLODIPine (NORVASC) 5 MG tablet TAKE ONE TABLET BY MOUTH EVERY DAY . .......... Velora Sis  BURTON, Disp-30 tablet, R-3      simvastatin (ZOCOR) 40 MG tablet Take 1 tablet by mouth every evening., Disp-30 tablet, R-5      glucose monitoring kit (FREESTYLE) monitoring kit DAILY PRN Starting 3/19/2015, Until Discontinued, Disp-1 kit, R-0, NormalDX:250.00      glucose blood VI test strips (EXACTECH TEST) strip DAILY Starting 3/19/2015, Until Discontinued, Disp-100 each, R-3, NormalTest sugars daily and prn DX:250.00      Lancets MISC Disp-100 each, R-3, NormalDX: 250.00  Test sugar daily and prn             ALLERGIES     Lortab [hydrocodone-acetaminophen] and Penicillins    FAMILY HISTORY       Family History   Problem Relation Age of Onset    Diabetes Mother     High Blood Pressure Mother     Cancer Father           SOCIAL HISTORY       Social History     Socioeconomic History    Marital status:      Spouse name: None    Number of children: None    Years of education: None    Highest education level: None   Occupational History    None   Social Needs    Financial resource strain: None    Food insecurity:     Worry: None     Inability: None    Transportation needs:     Medical: None     Non-medical: None   Tobacco Use    Smoking status: Former Smoker     Last attempt to quit: 2011     Years since quittin.3    Smokeless tobacco: Never Used   Substance and Sexual Activity    Alcohol use: No    Drug use: No    Sexual activity: None   Lifestyle    Physical activity:     Days per week: None     Minutes per session: None    Stress: None   Relationships    Social connections:     Talks on phone: None     Gets together: None     Attends Mormonism service: None     Active member of club or organization: None     Attends meetings of clubs or organizations: None     Relationship status: None    Intimate partner violence:     Fear of current or ex partner: None     Emotionally abused: None     Physically abused: None     Forced sexual activity: None   Other Topics Concern    None   Social History Narrative    None       SCREENINGS    Shmuel Coma Scale  Eye Opening: Spontaneous  Best Verbal Response: Oriented  Best Motor Response: Obeys commands  Shmuel Coma Scale Score: 15        PHYSICAL EXAM    (up to 7 for level 4, 8 or more for level 5)     ED Triage Vitals [05/13/19 1432]   BP Temp Temp src Pulse Resp SpO2 Height Weight   (!) 179/84 98.1 °F (36.7 °C) -- 93 18 97 % 5' 11\" (1.803 m) 270 lb (122.5 kg)       Physical Exam   Constitutional: He is oriented to person, place, and time. He appears well-nourished. HENT:   Head: Normocephalic. Cardiovascular: Normal rate. Pulmonary/Chest: Effort normal.   Musculoskeletal:   Right anterior knee with diffuse ttp, with obvious knee effusion noted. Painful active flexion/extension of right knee w/decreased flexion of knee, compartments of right lower extremity are soft  Normal ROM right hip  No overlying knee joint redness, heat or streaking noted   Neurological: He is alert and oriented to person, place, and time. Skin: Skin is dry. Vitals reviewed. DIAGNOSTIC RESULTS     EKG: All EKG's are interpreted by the Emergency Department Physician who either signs or Co-signs this chart in the absence of acardiologist.        RADIOLOGY:   Non-plain film images such as CT, Ultrasound andMRI are read by the radiologist. Plain radiographic images are visualized and preliminarily interpreted by the emergency physician with the below findings:        Interpretation per the Radiologist below, if available at the time of this note:    XR KNEE RIGHT (1-2 VIEWS)   Final Result   Impression:    1. There is a new moderate-sized joint effusion at the knee without   visualized fracture or malalignment. Etiology for the effusion is   unknown. If concerned for septic arthritis, a joint aspiration may be   considered. Signed by Dr Lanette Fothergill on 5/13/2019 4:22 PM      VL Extremity Venous Right           Right lower extremity venous duplex performed.      There is no evidence of DVT, SVT, or REFLUX noted at this time.     This is a preliminary report. Final report pending.         ED BEDSIDE ULTRASOUND:   Performed by ED Physician - none    LABS:  Labs Reviewed   CBC WITH AUTO DIFFERENTIAL - Abnormal; Notable for the following components:       Result Value    RDW 16.1 (*)     Neutrophils % 75.9 (*)     Lymphocytes % 18.9 (*)     All other components within normal limits   COMPREHENSIVE METABOLIC PANEL - Abnormal; Notable for the following components:    Glucose 111 (*)     All other components within normal limits   C-REACTIVE PROTEIN - Abnormal; Notable for the following components:    CRP 1.36 (*)     All other components within normal limits   URIC ACID - Abnormal; Notable for the following components:    Uric Acid, Serum 8.7 (*)     All other components within normal limits   BODY FLUID CULTURE    Narrative:     ORDER#: [id number]                          ORDERED BY: [facility]  SOURCE: Synovial Fluid Body Fluid          COLLECTED:  05/13/19 18:00  ANTIBIOTICS AT HOMERO.:                      RECEIVED :  05/13/19 18:18   SEDIMENTATION RATE   BODY FLUID CELL COUNT WITH DIFFERENTIAL   BODY FLUID CRYSTAL       All other labs were within normal range or not returned as of this dictation. RE-ASSESSMENT     Synovial fluid result reviewed does not appear consistent with a septic arthritis. EMERGENCY DEPARTMENT COURSE and DIFFERENTIALDIAGNOSIS/MDM:   Vitals:    Vitals:    05/13/19 1432 05/13/19 1735 05/13/19 2000   BP: (!) 179/84 (!) 175/98 (!) 170/92   Pulse: 93 88 85   Resp: 18 18 16   Temp: 98.1 °F (36.7 °C)  98.1 °F (36.7 °C)   TempSrc:   Temporal   SpO2: 97% 94% 94%   Weight: 270 lb (122.5 kg)     Height: 5' 11\" (1.803 m)         MDM      CONSULTS:  None    PROCEDURES:  Unless otherwise notedbelow, none     Arthrocentesis  Date/Time: 5/13/2019 5:48 PM  Performed by: WILMAN Harrington  Authorized by: IWLMAN Harrington     Consent:     Consent obtained:  Written    Consent given by:  Patient    Risks discussed:  Infection and pain    Alternatives discussed:  No treatment  Location:     Location:  Knee    Knee:  L knee  Anesthesia (see MAR for exact dosages):      Anesthesia method:  Local infiltration    Local anesthetic:  Lidocaine 1% w/o epi and bupivacaine 0.5% w/o epi  Procedure details:     Preparation: Patient was prepped and draped in usual sterile fashion      Needle gauge:  18 G    Ultrasound guidance: no      Approach:  Lateral    Aspirate amount:  120ml    Aspirate characteristics:  Clear    Steroid injected: no      Specimen collected: yes    Post-procedure details:     Dressing:  Sterile dressing and gauze roll    Patient tolerance of procedure: Tolerated well, no immediate complications        FINAL IMPRESSION     1. Knee effusion, right    2. Acute pain of right knee    3. History of acute gouty arthritis          DISPOSITION/PLAN   DISPOSITION Decision To Discharge 05/13/2019 08:25:09 PM      PATIENT REFERRED TO:  MD [address]  [place] [phone]    Schedule an appointment as soon as possible for a visit         DISCHARGE MEDICATIONS:    Attestation: The Prescription Monitoring Report for this patient was reviewed today. ([id number]) WILMAN Canchola)  Chronic Pain Routine Monitoring: No signs of potential drug abuse or diversion identified: otherwise, see note documentation WILMAN Canchola)  Discharge Medication List as of 5/13/2019  8:28 PM           Medication List      START taking these medications    oxyCODONE-acetaminophen  MG per tablet  Commonly known as:  PERCOCET  Take 1 tablet by mouth every 6 hours as needed for Pain for up to 3 days. Intended supply: 3 days        ASK your doctor about these medications    amitriptyline 50 MG tablet  Commonly known as:  ELAVIL     amLODIPine 5 MG tablet  Commonly known as:  NORVASC  TAKE ONE TABLET BY MOUTH EVERY DAY . .......... Beckey [employer] BURTON     atenolol 100 MG tablet  Commonly known as:  TENORMIN  Take 1 tablet by mouth 2 times daily NEEDS APPOINTMENT     blood glucose test strips strip  Commonly known as:  EXACTECH TEST  1 each by In Vitro route daily.  Test sugars daily and prn DX:250.00     cloNIDine 0.3 MG tablet  Commonly known as:  CATAPRES  TAKE THREE TABLETS BY MOUTH TWICE A DAY     colchicine 0.6 MG tablet  Commonly known as:  COLCRYS     diclofenac 75 MG EC tablet  Commonly known as:  VOLTAREN     DULoxetine 60 MG extended release capsule  Commonly known as:  CYMBALTA     glucose monitoring kit monitoring kit  1 kit by Does not apply route daily as needed. DX:250.00     hydrOXYzine 25 MG capsule  Commonly known as:  VISTARIL     Lancets Misc  DX: 250.00  Test sugar daily and prn     metFORMIN 1000 MG tablet  Commonly known as:  GLUCOPHAGE     prazosin 2 MG capsule  Commonly known as:  MINIPRESS     simvastatin 40 MG tablet  Commonly known as:  ZOCOR  Take 1 tablet by mouth every evening.      traZODone 100 MG tablet  Commonly known as:  DESYREL           Where to Get Your Medications      You can get these medications from any pharmacy    Bring a paper prescription for each of these medications  · oxyCODONE-acetaminophen  MG per tablet         (Pleasenote that portions of this note were completed with a voice recognition program.  Efforts were made to edit the dictations but occasionally words are mis-transcribed.)              Andreia Sharpe, APRN  05/13/19 1824

## 2019-05-13 NOTE — PROGRESS NOTES
Right lower extremity venous duplex performed. There is no evidence of DVT, SVT, or REFLUX noted at this time. This is a preliminary report. Final report pending.

## 2019-05-13 NOTE — ED NOTES
Pt signed treatment consent for Arthrocentesis of right knee by Juliano STOVALL.       Sharon Johnson RN  05/13/19 4240

## 2019-05-17 LAB
ANAEROBIC CULTURE: NORMAL
BODY FLUID CULTURE, STERILE: NORMAL
GRAM STAIN RESULT: NORMAL

## 2019-07-22 ENCOUNTER — APPOINTMENT (OUTPATIENT)
Dept: CT IMAGING | Age: 54
End: 2019-07-22
Payer: MEDICARE

## 2019-07-22 ENCOUNTER — HOSPITAL ENCOUNTER (EMERGENCY)
Age: 54
Discharge: HOME | End: 2019-07-22
Attending: FAMILY MEDICINE
Payer: MEDICARE

## 2019-07-22 VITALS
OXYGEN SATURATION: 96 % | DIASTOLIC BLOOD PRESSURE: 106 MMHG | SYSTOLIC BLOOD PRESSURE: 153 MMHG | RESPIRATION RATE: 13 BRPM | HEART RATE: 61 BPM | TEMPERATURE: 97.7 F | HEIGHT: 71 IN | WEIGHT: 280 LBS | BODY MASS INDEX: 39.2 KG/M2

## 2019-07-22 DIAGNOSIS — I10 HYPERTENSION, UNSPECIFIED TYPE: Primary | ICD-10-CM

## 2019-07-22 DIAGNOSIS — G44.1 OTHER VASCULAR HEADACHE: ICD-10-CM

## 2019-07-22 LAB
ALBUMIN SERPL-MCNC: 5 G/DL (ref 3.5–5.2)
ALP BLD-CCNC: 112 U/L (ref 40–130)
ALT SERPL-CCNC: 63 U/L (ref 5–41)
AMPHETAMINE SCREEN, URINE: NEGATIVE
ANION GAP SERPL CALCULATED.3IONS-SCNC: 13 MMOL/L (ref 7–19)
AST SERPL-CCNC: 34 U/L (ref 5–40)
BARBITURATE SCREEN URINE: NEGATIVE
BASOPHILS ABSOLUTE: 0 K/UL (ref 0–0.2)
BASOPHILS RELATIVE PERCENT: 0.6 % (ref 0–1)
BENZODIAZEPINE SCREEN, URINE: NEGATIVE
BILIRUB SERPL-MCNC: 0.3 MG/DL (ref 0.2–1.2)
BILIRUBIN URINE: NEGATIVE
BLOOD, URINE: NEGATIVE
BUN BLDV-MCNC: 12 MG/DL (ref 6–20)
CALCIUM SERPL-MCNC: 10.3 MG/DL (ref 8.6–10)
CANNABINOID SCREEN URINE: NEGATIVE
CHLORIDE BLD-SCNC: 96 MMOL/L (ref 98–111)
CLARITY: CLEAR
CO2: 30 MMOL/L (ref 22–29)
COCAINE METABOLITE SCREEN URINE: NEGATIVE
COLOR: YELLOW
CREAT SERPL-MCNC: 1 MG/DL (ref 0.5–1.2)
EOSINOPHILS ABSOLUTE: 0.1 K/UL (ref 0–0.6)
EOSINOPHILS RELATIVE PERCENT: 1.8 % (ref 0–5)
ETHANOL: <10 MG/DL (ref 0–0.08)
GFR NON-AFRICAN AMERICAN: >60
GLUCOSE BLD-MCNC: 97 MG/DL (ref 74–109)
GLUCOSE URINE: NEGATIVE MG/DL
HCT VFR BLD CALC: 49 % (ref 42–52)
HEMOGLOBIN: 16.2 G/DL (ref 14–18)
KETONES, URINE: NEGATIVE MG/DL
LEUKOCYTE ESTERASE, URINE: NEGATIVE
LYMPHOCYTES ABSOLUTE: 2.6 K/UL (ref 1.1–4.5)
LYMPHOCYTES RELATIVE PERCENT: 36.2 % (ref 20–40)
Lab: NORMAL
MCH RBC QN AUTO: 29.4 PG (ref 27–31)
MCHC RBC AUTO-ENTMCNC: 33.1 G/DL (ref 33–37)
MCV RBC AUTO: 88.9 FL (ref 80–94)
MONOCYTES ABSOLUTE: 0.5 K/UL (ref 0–0.9)
MONOCYTES RELATIVE PERCENT: 6.9 % (ref 0–10)
NEUTROPHILS ABSOLUTE: 3.9 K/UL (ref 1.5–7.5)
NEUTROPHILS RELATIVE PERCENT: 54.1 % (ref 50–65)
NITRITE, URINE: NEGATIVE
OPIATE SCREEN URINE: NEGATIVE
PDW BLD-RTO: 14.7 % (ref 11.5–14.5)
PH UA: 6 (ref 5–8)
PLATELET # BLD: 290 K/UL (ref 130–400)
PMV BLD AUTO: 9.9 FL (ref 9.4–12.4)
POTASSIUM SERPL-SCNC: 4.2 MMOL/L (ref 3.5–5)
PRO-BNP: 38 PG/ML (ref 0–900)
PROTEIN UA: ABNORMAL MG/DL
RBC # BLD: 5.51 M/UL (ref 4.7–6.1)
SODIUM BLD-SCNC: 139 MMOL/L (ref 136–145)
SPECIFIC GRAVITY UA: 1.01 (ref 1–1.03)
TOTAL PROTEIN: 8.9 G/DL (ref 6.6–8.7)
TSH SERPL DL<=0.05 MIU/L-ACNC: 1.53 UIU/ML (ref 0.27–4.2)
URINE REFLEX TO CULTURE: ABNORMAL
UROBILINOGEN, URINE: 0.2 E.U./DL
WBC # BLD: 7.3 K/UL (ref 4.8–10.8)

## 2019-07-22 PROCEDURE — 6360000002 HC RX W HCPCS: Performed by: FAMILY MEDICINE

## 2019-07-22 PROCEDURE — 2500000003 HC RX 250 WO HCPCS: Performed by: FAMILY MEDICINE

## 2019-07-22 PROCEDURE — 93005 ELECTROCARDIOGRAM TRACING: CPT

## 2019-07-22 PROCEDURE — 84443 ASSAY THYROID STIM HORMONE: CPT

## 2019-07-22 PROCEDURE — 70450 CT HEAD/BRAIN W/O DYE: CPT

## 2019-07-22 PROCEDURE — 99284 EMERGENCY DEPT VISIT MOD MDM: CPT

## 2019-07-22 PROCEDURE — 6370000000 HC RX 637 (ALT 250 FOR IP): Performed by: FAMILY MEDICINE

## 2019-07-22 PROCEDURE — 36415 COLL VENOUS BLD VENIPUNCTURE: CPT

## 2019-07-22 PROCEDURE — 81003 URINALYSIS AUTO W/O SCOPE: CPT

## 2019-07-22 PROCEDURE — 99284 EMERGENCY DEPT VISIT MOD MDM: CPT | Performed by: FAMILY MEDICINE

## 2019-07-22 PROCEDURE — 83880 ASSAY OF NATRIURETIC PEPTIDE: CPT

## 2019-07-22 PROCEDURE — 96374 THER/PROPH/DIAG INJ IV PUSH: CPT

## 2019-07-22 PROCEDURE — G0480 DRUG TEST DEF 1-7 CLASSES: HCPCS

## 2019-07-22 PROCEDURE — 96375 TX/PRO/DX INJ NEW DRUG ADDON: CPT

## 2019-07-22 PROCEDURE — 80053 COMPREHEN METABOLIC PANEL: CPT

## 2019-07-22 PROCEDURE — 80307 DRUG TEST PRSMV CHEM ANLYZR: CPT

## 2019-07-22 PROCEDURE — 85025 COMPLETE CBC W/AUTO DIFF WBC: CPT

## 2019-07-22 RX ORDER — AMLODIPINE BESYLATE 5 MG/1
10 TABLET ORAL DAILY
Qty: 30 TABLET | Refills: 3 | Status: SHIPPED | OUTPATIENT
Start: 2019-07-22

## 2019-07-22 RX ORDER — MORPHINE SULFATE 4 MG/ML
4 INJECTION, SOLUTION INTRAMUSCULAR; INTRAVENOUS ONCE
Status: COMPLETED | OUTPATIENT
Start: 2019-07-22 | End: 2019-07-22

## 2019-07-22 RX ORDER — AMLODIPINE BESYLATE 5 MG/1
10 TABLET ORAL ONCE
Status: COMPLETED | OUTPATIENT
Start: 2019-07-22 | End: 2019-07-22

## 2019-07-22 RX ORDER — ONDANSETRON 4 MG/1
4 TABLET, ORALLY DISINTEGRATING ORAL ONCE
Status: COMPLETED | OUTPATIENT
Start: 2019-07-22 | End: 2019-07-22

## 2019-07-22 RX ORDER — PROMETHAZINE HYDROCHLORIDE 25 MG/ML
12.5 INJECTION, SOLUTION INTRAMUSCULAR; INTRAVENOUS ONCE
Status: COMPLETED | OUTPATIENT
Start: 2019-07-22 | End: 2019-07-22

## 2019-07-22 RX ORDER — ATENOLOL 100 MG/1
100 TABLET ORAL 2 TIMES DAILY
Qty: 40 TABLET | Refills: 0 | Status: SHIPPED | OUTPATIENT
Start: 2019-07-22 | End: 2021-04-27 | Stop reason: ALTCHOICE

## 2019-07-22 RX ORDER — METOPROLOL TARTRATE 5 MG/5ML
5 INJECTION INTRAVENOUS ONCE
Status: COMPLETED | OUTPATIENT
Start: 2019-07-22 | End: 2019-07-22

## 2019-07-22 RX ORDER — ACETAMINOPHEN 325 MG/1
650 TABLET ORAL ONCE
Status: COMPLETED | OUTPATIENT
Start: 2019-07-22 | End: 2019-07-22

## 2019-07-22 RX ORDER — HYDROCHLOROTHIAZIDE 25 MG/1
25 TABLET ORAL DAILY
Qty: 30 TABLET | Refills: 1 | Status: SHIPPED | OUTPATIENT
Start: 2019-07-22

## 2019-07-22 RX ADMIN — MORPHINE SULFATE 4 MG: 4 INJECTION INTRAVENOUS at 19:07

## 2019-07-22 RX ADMIN — ONDANSETRON 4 MG: 4 TABLET, ORALLY DISINTEGRATING ORAL at 17:13

## 2019-07-22 RX ADMIN — ACETAMINOPHEN 650 MG: 325 TABLET ORAL at 17:13

## 2019-07-22 RX ADMIN — AMLODIPINE BESYLATE 10 MG: 5 TABLET ORAL at 16:47

## 2019-07-22 RX ADMIN — PROMETHAZINE HYDROCHLORIDE 12.5 MG: 25 INJECTION INTRAMUSCULAR; INTRAVENOUS at 19:07

## 2019-07-22 RX ADMIN — METOPROLOL TARTRATE 5 MG: 5 INJECTION, SOLUTION INTRAVENOUS at 18:14

## 2019-07-22 ASSESSMENT — PAIN SCALES - GENERAL
PAINLEVEL_OUTOF10: 9
PAINLEVEL_OUTOF10: 10

## 2019-07-22 NOTE — ED PROVIDER NOTES
[facility]  eMERGENCY dEPARTMENT eNCOUnter      Pt Name: Lisseth Simms  MRN: [id number]  [place] 1965  Date of evaluation: 7/22/2019  Provider: Malgorzata Hernández MD    CHIEF COMPLAINT       Chief Complaint   Patient presents with    Hypertension         HISTORY OF PRESENT ILLNESS   (Location/Symptom, Timing/Onset,Context/Setting, Quality, Duration, Modifying Factors, Severity)  Note limiting factors. Lisseth Simms is a 48 y.o. male who presents to the emergency department      Patient presents complaining of 1 to 2-day history of elevated blood pressure with headache he was unable to get into his primary provider and seeks treatment in the ER for the elevated pressure and headache. NursingNotes were reviewed. REVIEW OF SYSTEMS    (2-9 systems for level 4, 10 or more for level 5)     Review of Systems   Constitutional: Negative for activity change, appetite change, chills, fatigue and fever. HENT: Negative for nosebleeds, postnasal drip and sore throat. Respiratory: Negative for cough, shortness of breath and wheezing. Cardiovascular: Negative for chest pain. Gastrointestinal: Negative for abdominal pain, diarrhea, nausea and vomiting. Genitourinary: Negative for dysuria. Musculoskeletal: Negative for back pain. Skin: Negative for color change. Neurological: Positive for headaches. Negative for weakness. Psychiatric/Behavioral: The patient is nervous/anxious.              PAST MEDICALHISTORY     Past Medical History:   Diagnosis Date    Anxiety     Arthritis     Chronic back pain     Controlled type 2 diabetes mellitus without complication, without long-term current use of insulin ([address] 1/5/2017    Depression     Diabetes mellitus ([address]     Gout     Hyperlipidemia     Hypertension     Obesity     Umbilical hernia          SURGICAL HISTORY       Past Surgical History:   Procedure Laterality Date    BACK SURGERY      COLONOSCOPY      LUMBAR [address] SURGERY  2008 Mother     Cancer Father           SOCIAL HISTORY       Social History     Socioeconomic History    Marital status:      Spouse name: None    Number of children: None    Years of education: None    Highest education level: None   Occupational History    None   Social Needs    Financial resource strain: None    Food insecurity:     Worry: None     Inability: None    Transportation needs:     Medical: None     Non-medical: None   Tobacco Use    Smoking status: Former Smoker     Last attempt to quit: 2011     Years since quittin.5    Smokeless tobacco: Never Used   Substance and Sexual Activity    Alcohol use: No    Drug use: No    Sexual activity: None   Lifestyle    Physical activity:     Days per week: None     Minutes per session: None    Stress: None   Relationships    Social connections:     Talks on phone: None     Gets together: None     Attends Restorationist service: None     Active member of club or organization: None     Attends meetings of clubs or organizations: None     Relationship status: None    Intimate partner violence:     Fear of current or ex partner: None     Emotionally abused: None     Physically abused: None     Forced sexual activity: None   Other Topics Concern    None   Social History Narrative    None       SCREENINGS             PHYSICAL EXAM    (up to 7 for level 4, 8 or more for level 5)     ED Triage Vitals [19 1506]   BP Temp Temp Source Pulse Resp SpO2 Height Weight   (!) 146/90 97.7 °F (36.5 °C) Temporal 68 17 97 % 5' 11\" (1.803 m) 280 lb (127 kg)       Physical Exam   Constitutional: He is oriented to person, place, and time. He appears well-developed and well-nourished. HENT:   Head: Normocephalic. Neck: Normal range of motion. Cardiovascular: Normal rate and normal heart sounds. Pulmonary/Chest: Effort normal and breath sounds normal.   Abdominal: Soft.  Bowel sounds are normal.   Neurological: He is alert and oriented to person,

## 2019-07-23 LAB
EKG P AXIS: 35 DEGREES
EKG P-R INTERVAL: 210 MS
EKG Q-T INTERVAL: 418 MS
EKG QRS DURATION: 90 MS
EKG QTC CALCULATION (BAZETT): 423 MS
EKG T AXIS: -3 DEGREES

## 2019-07-24 ASSESSMENT — ENCOUNTER SYMPTOMS
DIARRHEA: 0
COLOR CHANGE: 0
VOMITING: 0
BACK PAIN: 0
NAUSEA: 0
WHEEZING: 0
ABDOMINAL PAIN: 0
SHORTNESS OF BREATH: 0
SORE THROAT: 0
COUGH: 0

## 2019-10-29 ENCOUNTER — HOSPITAL ENCOUNTER (EMERGENCY)
Facility: HOSPITAL | Age: 54
Discharge: HOME | End: 2019-10-29
Admitting: INTERNAL MEDICINE

## 2019-10-29 VITALS
BODY MASS INDEX: 36.96 KG/M2 | HEIGHT: 71 IN | HEART RATE: 84 BPM | TEMPERATURE: 98 F | WEIGHT: 264 LBS | SYSTOLIC BLOOD PRESSURE: 154 MMHG | OXYGEN SATURATION: 96 % | DIASTOLIC BLOOD PRESSURE: 84 MMHG | RESPIRATION RATE: 20 BRPM

## 2019-10-29 DIAGNOSIS — K64.2 GRADE III HEMORRHOIDS: Primary | ICD-10-CM

## 2019-10-29 PROCEDURE — 99283 EMERGENCY DEPT VISIT LOW MDM: CPT

## 2019-10-29 RX ORDER — HYDROCORTISONE ACETATE 25 MG/1
25 SUPPOSITORY RECTAL ONCE
Status: COMPLETED | OUTPATIENT
Start: 2019-10-29 | End: 2019-10-29

## 2019-10-29 RX ADMIN — HYDROCORTISONE ACETATE 25 MG: 25 SUPPOSITORY RECTAL at 21:07

## 2019-12-06 ENCOUNTER — HOSPITAL ENCOUNTER (EMERGENCY)
Facility: HOSPITAL | Age: 54
Discharge: HOME | End: 2019-12-06
Attending: EMERGENCY MEDICINE | Admitting: EMERGENCY MEDICINE

## 2019-12-06 ENCOUNTER — APPOINTMENT (OUTPATIENT)
Dept: CT IMAGING | Facility: HOSPITAL | Age: 54
End: 2019-12-06

## 2019-12-06 VITALS
HEART RATE: 62 BPM | TEMPERATURE: 97.9 F | HEIGHT: 71 IN | BODY MASS INDEX: 37.8 KG/M2 | WEIGHT: 270 LBS | OXYGEN SATURATION: 99 % | RESPIRATION RATE: 16 BRPM | DIASTOLIC BLOOD PRESSURE: 92 MMHG | SYSTOLIC BLOOD PRESSURE: 156 MMHG

## 2019-12-06 DIAGNOSIS — G44.319 ACUTE POST-TRAUMATIC HEADACHE, NOT INTRACTABLE: ICD-10-CM

## 2019-12-06 DIAGNOSIS — M54.2 NECK PAIN: Primary | ICD-10-CM
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
## 2019-12-06 PROCEDURE — 25010000002 KETOROLAC TROMETHAMINE PER 15 MG: Performed by: EMERGENCY MEDICINE

## 2019-12-06 PROCEDURE — 99283 EMERGENCY DEPT VISIT LOW MDM: CPT

## 2019-12-06 PROCEDURE — 96372 THER/PROPH/DIAG INJ SC/IM: CPT

## 2019-12-06 PROCEDURE — 72125 CT NECK SPINE W/O DYE: CPT

## 2019-12-06 PROCEDURE — 70450 CT HEAD/BRAIN W/O DYE: CPT

## 2019-12-06 RX ORDER — KETOROLAC TROMETHAMINE 30 MG/ML
30 INJECTION, SOLUTION INTRAMUSCULAR; INTRAVENOUS ONCE
Status: COMPLETED | OUTPATIENT
Start: 2019-12-06 | End: 2019-12-06

## 2019-12-06 RX ADMIN — KETOROLAC TROMETHAMINE 30 MG: 30 INJECTION, SOLUTION INTRAMUSCULAR at 22:14

## 2019-12-07 NOTE — DISCHARGE INSTRUCTIONS
Please return immediately to the emergency department for any new or worsening symptoms.  Please follow-up with your primary care provider in 2 days for reevaluation of symptoms.
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
## 2019-12-27 ENCOUNTER — HOSPITAL ENCOUNTER (OUTPATIENT)
Dept: MRI IMAGING | Age: 54
Discharge: HOME | End: 2019-12-27
Payer: OTHER MISCELLANEOUS

## 2019-12-27 DIAGNOSIS — M54.2 NECK PAIN ON LEFT SIDE: ICD-10-CM

## 2019-12-27 DIAGNOSIS — R29.898 LEFT ARM WEAKNESS: ICD-10-CM

## 2019-12-27 DIAGNOSIS — M54.12 CERVICAL RADICULOPATHY: ICD-10-CM

## 2019-12-27 PROCEDURE — 72141 MRI NECK SPINE W/O DYE: CPT

## 2020-04-07 ENCOUNTER — TELEPHONE (OUTPATIENT)
Dept: NEUROSURGERY | Facility: CLINIC | Age: 55
End: 2020-04-07

## 2020-04-15 ENCOUNTER — TELEPHONE (OUTPATIENT)
Dept: NEUROSURGERY | Facility: CLINIC | Age: 55
End: 2020-04-15

## 2020-04-15 NOTE — TELEPHONE ENCOUNTER
LF MESS WITH  - NEED VERBAL VERIFICATION OF OPEN PIP - NEED DATE OF INJ, ADDRESS TO BILL CLAIMS.   PHONE NO AND FAX NO.

## 2020-04-16 ENCOUNTER — OFFICE VISIT (OUTPATIENT)
Dept: NEUROSURGERY | Facility: CLINIC | Age: 55
End: 2020-04-16

## 2020-04-16 VITALS — WEIGHT: 299 LBS | HEIGHT: 71 IN | BODY MASS INDEX: 41.86 KG/M2

## 2020-04-16 DIAGNOSIS — Z87.891 FORMER TOBACCO USE: ICD-10-CM

## 2020-04-16 DIAGNOSIS — E66.01 MORBID OBESITY WITH BMI OF 40.0-44.9, ADULT (HCC): ICD-10-CM

## 2020-04-16 DIAGNOSIS — M54.12 CERVICAL RADICULOPATHY: ICD-10-CM

## 2020-04-16 DIAGNOSIS — M54.2 CERVICALGIA: Primary | ICD-10-CM

## 2020-04-16 PROCEDURE — 99204 OFFICE O/P NEW MOD 45 MIN: CPT | Performed by: NURSE PRACTITIONER

## 2020-04-16 RX ORDER — SIMVASTATIN 40 MG
40 TABLET ORAL NIGHTLY
COMMUNITY
Start: 2015-03-24 | End: 2022-01-14

## 2020-04-16 RX ORDER — ATENOLOL 100 MG/1
100 TABLET ORAL DAILY
COMMUNITY
Start: 2019-07-22 | End: 2022-01-14

## 2020-04-16 RX ORDER — AMLODIPINE BESYLATE 5 MG/1
10 TABLET ORAL DAILY
COMMUNITY
Start: 2019-07-22 | End: 2022-01-14

## 2020-04-16 RX ORDER — DICLOFENAC SODIUM 75 MG/1
75 TABLET, DELAYED RELEASE ORAL 2 TIMES DAILY
COMMUNITY
End: 2022-01-14

## 2020-04-16 RX ORDER — CLONIDINE HYDROCHLORIDE 0.3 MG/1
TABLET ORAL
COMMUNITY
Start: 2017-06-27 | End: 2023-02-06

## 2020-04-16 RX ORDER — TRAMADOL HYDROCHLORIDE 50 MG/1
TABLET ORAL
COMMUNITY
Start: 2020-01-22 | End: 2020-04-16

## 2020-04-16 RX ORDER — PRAZOSIN HYDROCHLORIDE 2 MG/1
2 CAPSULE ORAL NIGHTLY
COMMUNITY
End: 2022-01-14

## 2020-04-16 RX ORDER — ZOLPIDEM TARTRATE 5 MG/1
5 TABLET ORAL NIGHTLY PRN
COMMUNITY
End: 2022-01-14

## 2020-04-16 RX ORDER — DULOXETIN HYDROCHLORIDE 60 MG/1
60 CAPSULE, DELAYED RELEASE ORAL 2 TIMES DAILY
COMMUNITY
End: 2022-01-14

## 2020-04-16 RX ORDER — COLCHICINE 0.6 MG/1
0.6 TABLET ORAL DAILY
COMMUNITY
End: 2022-01-14

## 2020-04-16 RX ORDER — HYDROXYZINE PAMOATE 25 MG/1
25 CAPSULE ORAL NIGHTLY PRN
COMMUNITY
End: 2022-01-14

## 2020-04-16 RX ORDER — AMITRIPTYLINE HYDROCHLORIDE 50 MG/1
50 TABLET, FILM COATED ORAL 3 TIMES DAILY
COMMUNITY
End: 2022-01-14

## 2020-04-16 RX ORDER — PRAZOSIN HYDROCHLORIDE 5 MG/1
5 CAPSULE ORAL NIGHTLY
COMMUNITY
End: 2022-01-14

## 2020-04-16 RX ORDER — TRAZODONE HYDROCHLORIDE 100 MG/1
100 TABLET ORAL NIGHTLY
COMMUNITY
End: 2022-01-14

## 2020-04-16 NOTE — PATIENT INSTRUCTIONS
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
## 2020-04-22 ENCOUNTER — TREATMENT (OUTPATIENT)
Dept: PHYSICAL THERAPY | Facility: CLINIC | Age: 55
End: 2020-04-22

## 2020-04-22 DIAGNOSIS — M54.12 CERVICAL RADICULOPATHY: ICD-10-CM

## 2020-04-22 DIAGNOSIS — M54.2 CERVICALGIA: Primary | ICD-10-CM

## 2020-04-22 PROCEDURE — 97162 PT EVAL MOD COMPLEX 30 MIN: CPT | Performed by: PHYSICAL THERAPIST
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
## 2020-04-29 ENCOUNTER — TREATMENT (OUTPATIENT)
Dept: PHYSICAL THERAPY | Facility: CLINIC | Age: 55
End: 2020-04-29

## 2020-04-29 DIAGNOSIS — M54.2 CERVICALGIA: Primary | ICD-10-CM

## 2020-04-29 DIAGNOSIS — M54.12 CERVICAL RADICULOPATHY: ICD-10-CM

## 2020-04-29 PROCEDURE — 97140 MANUAL THERAPY 1/> REGIONS: CPT | Performed by: PHYSICAL THERAPIST

## 2020-04-29 NOTE — PROGRESS NOTES
Outpatient Physical Therapy Ortho Treatment Note       Patient Name: Ricardo Menchaca  : 1965  MRN: [id number]  Today's Date: 2020      Visit Date: 2020    Visit Dx:    ICD-10-CM ICD-9-CM   1. Cervicalgia M54.2 723.1   2. Cervical radiculopathy M54.12 723.4       Patient Active Problem List   Diagnosis   • Cervicalgia   • Cervical radiculopathy   • Morbid obesity with BMI of 40.0-44.9, adult (CMS/[facility])   • Former tobacco use        Past Medical History:   Diagnosis Date   • Depression    • Diabetes mellitus (CMS/[facility])    • Hypertension         Past Surgical History:   Procedure Laterality Date   • CARDIAC CATHETERIZATION  2015    Left Heart   • SPINAL FUSION                         PT Assessment/Plan     Row Name 20 1600          PT Assessment    Assessment Comments  He has been able to tolerate the roll in his pillow and sleep. He has not gotten much more rotation. He is becoming more aware of how guarded he is posturing himself which can be causing secondary muscle pain.   -HR        PT Plan    PT Plan Comments  See how neural glides are going. Cont STM and improving alignment  -HR       User Key  (r) = Recorded By, (t) = Taken By, (c) = Cosigned By    Initials Name Provider Type    HR Cherry Sanderson, PT, DPT, CLMIRIAM Physical Therapist            OP Exercises     Row Name 20 1500             Total Minutes    12570 - PT Manual Therapy Minutes  55  -HR        User Key  (r) = Recorded By, (t) = Taken By, (c) = Cosigned By    Initials Name Provider Type    HR Cherry Sanderson, PT, DPT, CLT-STEPHY Physical Therapist                      Manual Rx (last 36 hours)      Manual Treatments     Row Name 20 1500             Total Minutes    19718 - PT Manual Therapy Minutes  55  -HR         Manual Rx 1    Manual Rx 1 Location  prone- thoracic IASTM with ratchet bolster  -HR      Manual Rx 1 Type  STM  -HR      Manual Rx 1 Grade  2-3  -HR      Manual Rx 1 Duration  15  -HR       "   Manual Rx 2    Manual Rx 2 Location  suboccipital release manual  -HR      Manual Rx 2 Grade  3  -HR      Manual Rx 2 Duration  15 total  -HR         Manual Rx 3    Manual Rx 3 Location  manual rotation stretch to tolerance  -HR         Manual Rx 4    Manual Rx 4 Location  manual sidebending stretch to tolerance  -HR         Manual Rx 5    Manual Rx 5 Location  went through neural glide technique with slow back and forth from \"walk like an [place]\" to arm extended with wrist flexed  -HR        User Key  (r) = Recorded By, (t) = Taken By, (c) = Cosigned By    Initials Name Provider Type    HR Cherry Sanderson, PT, DPT, CLT-STEPHY Physical Therapist          PT OP Goals     Row Name 04/29/20 1600          PT Short Term Goals    STG Date to Achieve  05/06/20  -HR     STG 1  Radicular symptoms in LUE will not go past the shoulder.  -HR     STG 1 Progress  Ongoing  -HR     STG 2  Cervical ROM into rotation will allow him to look over each shoulder.   -HR     STG 2 Progress  Ongoing  -HR     STG 3  Report increased strength in L hand.   -HR     STG 3 Progress  New  -HR        Long Term Goals    LTG 1  Pain will be centralized to neck and will be no greater than 5/10 at worst.   -HR     LTG 1 Progress  New  -HR     LTG 2  He will report no headaches X 2 weeks.  -HR     LTG 2 Progress  New  -HR     LTG 3  L  strength will be at least 80 lbs.  -HR     LTG 3 Progress  New  -HR     LTG 4  He will be aware of improved posture and self correct.   -HR     LTG 4 Progress  New  -HR     LTG 5  Independent with comprehensive HEP for posture and LUE strengthening.   -HR     LTG 5 Progress  New  -HR        Time Calculation    PT Goal Re-Cert Due Date  05/22/20  -HR       User Key  (r) = Recorded By, (t) = Taken By, (c) = Cosigned By    Initials Name Provider Type    HR Cherry Sanderson, PT, DPT, CLT-STEPHY Physical Therapist          Therapy Education  Education Details: added neural glides to HEP              Time " Calculation:                    Cherry Sanderson, PT, DPT, CLT-STEPHY  4/29/2020

## 2020-05-06 ENCOUNTER — TREATMENT (OUTPATIENT)
Dept: PHYSICAL THERAPY | Facility: CLINIC | Age: 55
End: 2020-05-06

## 2020-05-06 DIAGNOSIS — M54.2 CERVICALGIA: Primary | ICD-10-CM

## 2020-05-06 DIAGNOSIS — M54.12 CERVICAL RADICULOPATHY: ICD-10-CM

## 2020-05-06 PROCEDURE — 97140 MANUAL THERAPY 1/> REGIONS: CPT | Performed by: PHYSICAL THERAPIST

## 2020-05-06 NOTE — PROGRESS NOTES
Outpatient Physical Therapy Lymphedema Treatment Note       Patient Name: Ricardo Menchaca  : 1965  MRN: [id number]  Today's Date: 2020        Visit Date: 2020    Visit Dx:    ICD-10-CM ICD-9-CM   1. Cervicalgia M54.2 723.1   2. Cervical radiculopathy M54.12 723.4       Patient Active Problem List   Diagnosis   • Cervicalgia   • Cervical radiculopathy   • Morbid obesity with BMI of 40.0-44.9, adult (CMS/[facility])   • Former tobacco use        Lymphedema     Row Name 20 1500             Subjective Pain    Able to rate subjective pain?  yes  -AL      Pre-Treatment Pain Level  8  -AL      Subjective Pain Comment  Pain is at the base of the skull and into the L hand and fingers.  -AL        User Key  (r) = Recorded By, (t) = Taken By, (c) = Cosigned By    Initials Name Provider Type    Karena Claros PTA, RAFY Physical Therapy Assistant                        PT Assessment/Plan     Row Name 20 1500          PT Assessment    Assessment Comments  Patient has increase pain before treatment, but had a good response to treatment reporting less overall pain and less pain with neck rotation.  He did not have pain going down the L arm after treatment with neck roatation.  He will try to use the tennis balls for self occipital release.    -AL        PT Plan    PT Plan Comments  Continue to work on STM as well as occipital release to help decrease pain.   -AL       User Key  (r) = Recorded By, (t) = Taken By, (c) = Cosigned By    Initials Name Provider Type    Karena Claros PTA, CLT-LANA Physical Therapy Assistant             OP Exercises     Row Name 20 1500             Subjective Comments    Subjective Comments  His head is hurting today.  He thinks it may be from driving.  -AL         Subjective Pain    Able to rate subjective pain?  yes  -AL      Pre-Treatment Pain Level  8  -AL      Post-Treatment Pain Level  4  -AL      Subjective Pain Comment  Pain is at the base of the skull and  into the L hand and fingers.  -AL         Total Minutes    51331 - PT Manual Therapy Minutes  45  -AL        User Key  (r) = Recorded By, (t) = Taken By, (c) = Cosigned By    Initials Name Provider Type    Praful Clarosyvrose FLEMING PTA, CLT-STEPHY Physical Therapy Assistant                     Manual Rx (last 36 hours)      Manual Treatments     Row Name 05/06/20 1500             Total Minutes    28458 - PT Manual Therapy Minutes  45  -AL         Manual Rx 1    Manual Rx 1 Location  prone- thoracic IASTM with ratchet bolster  -AL      Manual Rx 1 Type  STM  -AL      Manual Rx 1 Grade  2-3  -AL      Manual Rx 1 Duration  15  -AL         Manual Rx 2    Manual Rx 2 Location  suboccipital release manual  -AL      Manual Rx 2 Grade  3  -AL      Manual Rx 2 Duration  15 total  -AL         Manual Rx 3    Manual Rx 3 Location  manual rotation stretch to tolerance  -AL         Manual Rx 4    Manual Rx 4 Location  manual sidebending stretch to tolerance  -AL         Manual Rx 5    Manual Rx 5 Location  neural glide technique with slow back and forth from to arm extended with wrist flexed  -AL         Manual Rx 6    Manual Rx 6 Location  occipital area  -AL      Manual Rx 6 Type  prone  -AL      Manual Rx 6 Duration  10  -AL         Manual Rx 7    Manual Rx 7 Location  Use two tennis balls, one over each occipital area in a sock for self occipital release.  -AL        User Key  (r) = Recorded By, (t) = Taken By, (c) = Cosigned By    Initials Name Provider Type    Praful Clarosyvrose FLEMING PTA, CLT-STEPHY Physical Therapy Assistant          PT OP Goals     Row Name 05/06/20 1500          PT Short Term Goals    STG Date to Achieve  05/06/20  -AL     STG 1  Radicular symptoms in JADEE will not go past the shoulder.  -AL     STG 1 Progress  Ongoing  -AL     STG 1 Progress Comments  Still going to the L hand  -AL     STG 2  Cervical ROM into rotation will allow him to look over each shoulder.   -AL     STG 2 Progress  Ongoing  -AL     STG 2 Progress  Comments  Better after treatment  -AL     STG 3  Report increased strength in L hand.   -AL     STG 3 Progress  New  -AL        Long Term Goals    LTG 1  Pain will be centralized to neck and will be no greater than 5/10 at worst.   -AL     LTG 1 Progress  New  -AL     LTG 2  He will report no headaches X 2 weeks.  -AL     LTG 2 Progress  New  -AL     LTG 3  L  strength will be at least 80 lbs.  -AL     LTG 3 Progress  New  -AL     LTG 4  He will be aware of improved posture and self correct.   -AL     LTG 4 Progress  Ongoing  -AL     LTG 4 Progress Comments  Working on posture  -AL     LTG 5  Independent with comprehensive HEP for posture and LUE strengthening.   -AL     LTG 5 Progress  New  -AL        Time Calculation    PT Goal Re-Cert Due Date  05/22/20  -AL       User Key  (r) = Recorded By, (t) = Taken By, (c) = Cosigned By    Initials Name Provider Type    Karena Claros PTA, CLT-LANA Physical Therapy Assistant          Therapy Education  Education Details: Use two tennis balls, one over each occipital area in a sock for self occipital release.  Given: Pain management  Program: New  How Provided: Verbal  Provided to: Patient  Level of Understanding: Verbalized              Time Calculation:                     Karena Correia PTA, CLT-LANA  5/6/2020

## 2020-05-08 ENCOUNTER — TREATMENT (OUTPATIENT)
Dept: PHYSICAL THERAPY | Facility: CLINIC | Age: 55
End: 2020-05-08

## 2020-05-08 DIAGNOSIS — M54.12 CERVICAL RADICULOPATHY: ICD-10-CM

## 2020-05-08 DIAGNOSIS — M54.2 CERVICALGIA: Primary | ICD-10-CM

## 2020-05-08 PROCEDURE — 97140 MANUAL THERAPY 1/> REGIONS: CPT | Performed by: PHYSICAL THERAPIST

## 2020-05-08 PROCEDURE — 97110 THERAPEUTIC EXERCISES: CPT | Performed by: PHYSICAL THERAPIST

## 2020-05-08 NOTE — PROGRESS NOTES
Outpatient Physical Therapy Ortho Treatment Note       Patient Name: Ricardo Menchaca  : 1965  MRN: [id number]  Today's Date: 2020      Visit Date: 2020    Visit Dx:    ICD-10-CM ICD-9-CM   1. Cervicalgia M54.2 723.1   2. Cervical radiculopathy M54.12 723.4       Patient Active Problem List   Diagnosis   • Cervicalgia   • Cervical radiculopathy   • Morbid obesity with BMI of 40.0-44.9, adult (CMS/[facility])   • Former tobacco use        Past Medical History:   Diagnosis Date   • Depression    • Diabetes mellitus (CMS/[facility])    • Hypertension         Past Surgical History:   Procedure Laterality Date   • CARDIAC CATHETERIZATION  2015    Left Heart   • SPINAL FUSION                         PT Assessment/Plan     Row Name 20 1600          PT Assessment    Assessment Comments  I tried to initiate some scapular exercises today but it caused his pain to increase pretty severely. Manual cervical distraction again at the end of treatment calmed the symptoms back down.   -HR        PT Plan    PT Plan Comments  Cont to work on alignment and progress postural strength as able.   -HR       User Key  (r) = Recorded By, (t) = Taken By, (c) = Cosigned By    Initials Name Provider Type    HR Cherry Sanderson, PT, DPT, CLT-STEPHY Physical Therapist            OP Exercises     Row Name 20 1600 20 1500          Subjective Comments    Subjective Comments  His arm hasn't actually been bothering him the past couple of days.   -HR  --        Subjective Pain    Able to rate subjective pain?  yes  -HR  --     Pre-Treatment Pain Level  4  -HR  --     Post-Treatment Pain Level  1  -HR  --        Total Minutes    97036 - PT Therapeutic Exercise Minutes  15  -HR  --     39313 - PT Manual Therapy Minutes  --  40  -HR        Exercise 1    Exercise Name 1  scapular retraction with red tband  -HR  --     Cueing 1  Demo;Verbal  -HR  --     Sets 1  3  -HR  --     Reps 1  10  -HR  --     Additional Comments  green  caused anterior shoulder pain  -HR  --        Exercise 2    Exercise Name 2  UE phasic with red tband  -HR  --     Additional Comments  unable to tolerate with increased L shoulder and arm pain  -HR  --       User Key  (r) = Recorded By, (t) = Taken By, (c) = Cosigned By    Initials Name Provider Type    HR Cherry Sanderson, PT, DPT, RAFY Physical Therapist                      Manual Rx (last 36 hours)      Manual Treatments     Row Name 05/08/20 1500             Total Minutes    60297 - PT Manual Therapy Minutes  40  -HR         Manual Rx 1    Manual Rx 1 Location  prone- thoracic IASTM with ratchet bolster  -HR      Manual Rx 1 Type  STM  -HR      Manual Rx 1 Grade  2-3  -HR      Manual Rx 1 Duration  15  -HR         Manual Rx 2    Manual Rx 2 Location  suboccipital release manual  -HR      Manual Rx 2 Grade  3  -HR      Manual Rx 2 Duration  15 total  -HR         Manual Rx 3    Manual Rx 3 Location  manual rotation stretch to tolerance  -HR         Manual Rx 4    Manual Rx 4 Location  manual sidebending stretch to tolerance  -HR         Manual Rx 5    Manual Rx 5 Location  neural glide technique with slow back and forth from to arm extended with wrist flexed  -HR        User Key  (r) = Recorded By, (t) = Taken By, (c) = Cosigned By    Initials Name Provider Type    HR Cherry Sanderson, PT, DPT, RAFY Physical Therapist          PT OP Goals     Row Name 05/08/20 1500          PT Short Term Goals    STG Date to Achieve  05/06/20  -HR     STG 1  Radicular symptoms in LUE will not go past the shoulder.  -HR     STG 1 Progress  Ongoing  -HR     STG 1 Progress Comments  His arm has not been hurting today or yesterday  -HR     STG 2  Cervical ROM into rotation will allow him to look over each shoulder.   -HR     STG 2 Progress  Ongoing  -HR     STG 2 Progress Comments  better today  -HR     STG 3  Report increased strength in L hand.   -HR     STG 3 Progress  Ongoing;Progressing  -HR        Long Term  Goals    LTG Date to Achieve  06/05/20  -HR     LTG 1  Pain will be centralized to neck and will be no greater than 5/10 at worst.   -HR     LTG 1 Progress  New  -HR     LTG 2  He will report no headaches X 2 weeks.  -HR     LTG 2 Progress  New  -HR     LTG 3  L  strength will be at least 80 lbs.  -HR     LTG 3 Progress  New  -HR     LTG 4  He will be aware of improved posture and self correct.   -HR     LTG 4 Progress  Ongoing  -HR     LTG 5  Independent with comprehensive HEP for posture and LUE strengthening.   -HR     LTG 5 Progress  New  -HR        Time Calculation    PT Goal Re-Cert Due Date  05/22/20  -HR       User Key  (r) = Recorded By, (t) = Taken By, (c) = Cosigned By    Initials Name Provider Type    Cherry Hartmann, PT, DPT, CLT-STEPHY Physical Therapist          Therapy Education  Education Details: cont using the tennis balls as he said this has helped. He also asked about TENS unit and tband on where he could get his own               Time Calculation:                    Cherry Sanderson, PT, DPT, CLKATERINE-STEPHY  5/8/2020

## 2020-05-13 ENCOUNTER — TREATMENT (OUTPATIENT)
Dept: PHYSICAL THERAPY | Facility: CLINIC | Age: 55
End: 2020-05-13

## 2020-05-13 DIAGNOSIS — M54.2 CERVICALGIA: Primary | ICD-10-CM

## 2020-05-13 DIAGNOSIS — M54.12 CERVICAL RADICULOPATHY: ICD-10-CM

## 2020-05-13 PROCEDURE — 97110 THERAPEUTIC EXERCISES: CPT | Performed by: PHYSICAL THERAPIST

## 2020-05-13 PROCEDURE — 97140 MANUAL THERAPY 1/> REGIONS: CPT | Performed by: PHYSICAL THERAPIST

## 2020-05-13 NOTE — PROGRESS NOTES
Outpatient Physical Therapy Ortho Treatment Note       Patient Name: Ricardo Menchaca  : 1965  MRN: [id number]  Today's Date: 2020      Visit Date: 2020    Visit Dx:    ICD-10-CM ICD-9-CM   1. Cervicalgia M54.2 723.1   2. Cervical radiculopathy M54.12 723.4       Patient Active Problem List   Diagnosis   • Cervicalgia   • Cervical radiculopathy   • Morbid obesity with BMI of 40.0-44.9, adult (CMS/[facility])   • Former tobacco use        Past Medical History:   Diagnosis Date   • Depression    • Diabetes mellitus (CMS/[facility])    • Hypertension         Past Surgical History:   Procedure Laterality Date   • CARDIAC CATHETERIZATION  2015    Left Heart   • SPINAL FUSION                         PT Assessment/Plan     Row Name 20 1600          PT Assessment    Assessment Comments  He has had an increase in pain again with overuse of upper trap on L. Able to educate about being more aware of hiking up his shoulder.   -HR        PT Plan    PT Plan Comments  Cont to work on alignment and progress postural strength as able.   -HR       User Key  (r) = Recorded By, (t) = Taken By, (c) = Cosigned By    Initials Name Provider Type    HR Cherry Sanderson, PT, DPT, CLT-STEPHY Physical Therapist            OP Exercises     Row Name 20 1600             Subjective Comments    Subjective Comments  He has burning in his upper trap  -HR         Subjective Pain    Able to rate subjective pain?  yes  -HR      Pre-Treatment Pain Level  8  -HR         Total Minutes    88966 - PT Therapeutic Exercise Minutes  15  -HR      34528 - PT Manual Therapy Minutes  40  -HR         Exercise 1    Exercise Name 1  isolated scapular motions in R sidelying with L forearm on physioball  -HR      Cueing 1  Demo;Verbal  -HR      Time 1  5 min  -HR      Additional Comments  still some discomfort in upper trap  -HR        User Key  (r) = Recorded By, (t) = Taken By, (c) = Cosigned By    Initials Name Provider Type    AYAKA Sanderson  Cherry Chance, PT, DPT, CLKATERINE-STEPHY Physical Therapist                      Manual Rx (last 36 hours)      Manual Treatments     Row Name 05/13/20 1600 05/13/20 1500          Total Minutes    98319 - PT Manual Therapy Minutes  40  -HR  --        Manual Rx 1    Manual Rx 1 Location  --  prone- thoracic IASTM with ratchet bolster  -HR     Manual Rx 1 Type  --  STM  -HR     Manual Rx 1 Grade  --  2-3  -HR     Manual Rx 1 Duration  --  15  -HR        Manual Rx 2    Manual Rx 2 Location  --  suboccipital release manual  -HR     Manual Rx 2 Grade  --  3  -HR     Manual Rx 2 Duration  --  15 total  -HR        Manual Rx 3    Manual Rx 3 Location  --  manual rotation stretch to tolerance  -HR        Manual Rx 4    Manual Rx 4 Location  --  manual sidebending stretch to tolerance  -HR        Manual Rx 5    Manual Rx 5 Location  --  R sidelying- manual protraction/retraction of L scapula with arm on side  -HR       User Key  (r) = Recorded By, (t) = Taken By, (c) = Cosigned By    Initials Name Provider Type    HR Cherry Sanderson, PT, DPT, SOY-STEPHY Physical Therapist          PT OP Goals     Row Name 05/13/20 1600          PT Short Term Goals    STG Date to Achieve  05/06/20  -HR     STG 1  Radicular symptoms in KODY will not go past the shoulder.  -HR     STG 1 Progress  Ongoing  -HR     STG 2  Cervical ROM into rotation will allow him to look over each shoulder.   -HR     STG 2 Progress  Ongoing  -HR     STG 3  Report increased strength in L hand.   -HR     STG 3 Progress  Ongoing;Progressing  -HR        Long Term Goals    LTG Date to Achieve  06/05/20  -HR     LTG 1  Pain will be centralized to neck and will be no greater than 5/10 at worst.   -HR     LTG 1 Progress  New  -HR     LTG 2  He will report no headaches X 2 weeks.  -HR     LTG 2 Progress  New  -HR     LTG 3  L  strength will be at least 80 lbs.  -HR     LTG 3 Progress  New  -HR     LTG 4  He will be aware of improved posture and self correct.   -HR     LTG 4  Progress  Ongoing  -HR     LTG 5  Independent with comprehensive HEP for posture and LUE strengthening.   -HR     LTG 5 Progress  New  -HR        Time Calculation    PT Goal Re-Cert Due Date  05/22/20  -HR       User Key  (r) = Recorded By, (t) = Taken By, (c) = Cosigned By    Initials Name Provider Type    HR Cherry Sanderson, PT, DPT, CLT-STEPHY Physical Therapist          Therapy Education  Education Details: watch hiking up L shoulder throughout the day and with scapular exercises              Time Calculation:                    Cherry Sanderson, PT, DPT, CLT-STEPHY  5/13/2020

## 2020-05-20 ENCOUNTER — TREATMENT (OUTPATIENT)
Dept: PHYSICAL THERAPY | Facility: CLINIC | Age: 55
End: 2020-05-20

## 2020-05-20 ENCOUNTER — APPOINTMENT (OUTPATIENT)
Dept: CT IMAGING | Facility: HOSPITAL | Age: 55
End: 2020-05-20

## 2020-05-20 ENCOUNTER — HOSPITAL ENCOUNTER (EMERGENCY)
Facility: HOSPITAL | Age: 55
Discharge: LEFT BEFORE DISCHARGE | End: 2020-05-20
Attending: EMERGENCY MEDICINE | Admitting: EMERGENCY MEDICINE

## 2020-05-20 VITALS
HEART RATE: 80 BPM | WEIGHT: 308 LBS | TEMPERATURE: 99 F | OXYGEN SATURATION: 100 % | BODY MASS INDEX: 43.12 KG/M2 | DIASTOLIC BLOOD PRESSURE: 77 MMHG | HEIGHT: 71 IN | SYSTOLIC BLOOD PRESSURE: 118 MMHG | RESPIRATION RATE: 16 BRPM

## 2020-05-20 DIAGNOSIS — R51.9 INTRACTABLE HEADACHE, UNSPECIFIED CHRONICITY PATTERN, UNSPECIFIED HEADACHE TYPE: ICD-10-CM

## 2020-05-20 DIAGNOSIS — M54.12 CERVICAL RADICULOPATHY: ICD-10-CM

## 2020-05-20 DIAGNOSIS — M54.2 CERVICALGIA: Primary | ICD-10-CM

## 2020-05-20 DIAGNOSIS — Z53.29 LEFT AGAINST MEDICAL ADVICE: Primary | ICD-10-CM

## 2020-05-20 LAB
ALBUMIN SERPL-MCNC: 5.3 G/DL (ref 3.5–5.2)
ALBUMIN/GLOB SERPL: 1.4 G/DL
ALP SERPL-CCNC: 113 U/L (ref 39–117)
ALT SERPL W P-5'-P-CCNC: 48 U/L (ref 1–41)
ANION GAP SERPL CALCULATED.3IONS-SCNC: 15 MMOL/L (ref 5–15)
APTT PPP: 27.9 SECONDS (ref 24.1–35)
AST SERPL-CCNC: 48 U/L (ref 1–40)
BASOPHILS # BLD AUTO: 0.07 10*3/MM3 (ref 0–0.2)
BASOPHILS NFR BLD AUTO: 0.8 % (ref 0–1.5)
BILIRUB SERPL-MCNC: 0.3 MG/DL (ref 0.2–1.2)
BUN BLD-MCNC: 19 MG/DL (ref 6–20)
BUN/CREAT SERPL: 15.7 (ref 7–25)
CALCIUM SPEC-SCNC: 10.4 MG/DL (ref 8.6–10.5)
CHLORIDE SERPL-SCNC: 97 MMOL/L (ref 98–107)
CO2 SERPL-SCNC: 27 MMOL/L (ref 22–29)
CREAT BLD-MCNC: 1.21 MG/DL (ref 0.76–1.27)
DEPRECATED RDW RBC AUTO: 41.5 FL (ref 37–54)
EOSINOPHIL # BLD AUTO: 0.17 10*3/MM3 (ref 0–0.4)
EOSINOPHIL NFR BLD AUTO: 2 % (ref 0.3–6.2)
ERYTHROCYTE [DISTWIDTH] IN BLOOD BY AUTOMATED COUNT: 13.5 % (ref 12.3–15.4)
GFR SERPL CREATININE-BSD FRML MDRD: 76 ML/MIN/1.73
GLOBULIN UR ELPH-MCNC: 3.7 GM/DL
GLUCOSE BLD-MCNC: 120 MG/DL (ref 65–99)
HCT VFR BLD AUTO: 45.7 % (ref 37.5–51)
HGB BLD-MCNC: 15.3 G/DL (ref 13–17.7)
HOLD SPECIMEN: NORMAL
HOLD SPECIMEN: NORMAL
IMM GRANULOCYTES # BLD AUTO: 0.02 10*3/MM3 (ref 0–0.05)
IMM GRANULOCYTES NFR BLD AUTO: 0.2 % (ref 0–0.5)
INR PPP: 0.91 (ref 0.91–1.09)
LYMPHOCYTES # BLD AUTO: 4.09 10*3/MM3 (ref 0.7–3.1)
LYMPHOCYTES NFR BLD AUTO: 48.4 % (ref 19.6–45.3)
MCH RBC QN AUTO: 28.3 PG (ref 26.6–33)
MCHC RBC AUTO-ENTMCNC: 33.5 G/DL (ref 31.5–35.7)
MCV RBC AUTO: 84.6 FL (ref 79–97)
MONOCYTES # BLD AUTO: 0.62 10*3/MM3 (ref 0.1–0.9)
MONOCYTES NFR BLD AUTO: 7.3 % (ref 5–12)
NEUTROPHILS # BLD AUTO: 3.48 10*3/MM3 (ref 1.7–7)
NEUTROPHILS NFR BLD AUTO: 41.3 % (ref 42.7–76)
NRBC BLD AUTO-RTO: 0 /100 WBC (ref 0–0.2)
PLATELET # BLD AUTO: 325 10*3/MM3 (ref 140–450)
PMV BLD AUTO: 9.3 FL (ref 6–12)
POTASSIUM BLD-SCNC: 4.2 MMOL/L (ref 3.5–5.2)
PROT SERPL-MCNC: 9 G/DL (ref 6–8.5)
PROTHROMBIN TIME: 11.9 SECONDS (ref 11.9–14.6)
RBC # BLD AUTO: 5.4 10*6/MM3 (ref 4.14–5.8)
SODIUM BLD-SCNC: 139 MMOL/L (ref 136–145)
WBC NRBC COR # BLD: 8.45 10*3/MM3 (ref 3.4–10.8)
WHOLE BLOOD HOLD SPECIMEN: NORMAL
WHOLE BLOOD HOLD SPECIMEN: NORMAL

## 2020-05-20 PROCEDURE — 99284 EMERGENCY DEPT VISIT MOD MDM: CPT

## 2020-05-20 PROCEDURE — 85025 COMPLETE CBC W/AUTO DIFF WBC: CPT | Performed by: EMERGENCY MEDICINE

## 2020-05-20 PROCEDURE — 97140 MANUAL THERAPY 1/> REGIONS: CPT | Performed by: PHYSICAL THERAPIST

## 2020-05-20 PROCEDURE — 96374 THER/PROPH/DIAG INJ IV PUSH: CPT

## 2020-05-20 PROCEDURE — 25010000002 METOCLOPRAMIDE PER 10 MG: Performed by: EMERGENCY MEDICINE

## 2020-05-20 PROCEDURE — 25010000002 DIPHENHYDRAMINE PER 50 MG: Performed by: EMERGENCY MEDICINE

## 2020-05-20 PROCEDURE — 80053 COMPREHEN METABOLIC PANEL: CPT | Performed by: EMERGENCY MEDICINE

## 2020-05-20 PROCEDURE — 93005 ELECTROCARDIOGRAM TRACING: CPT | Performed by: EMERGENCY MEDICINE

## 2020-05-20 PROCEDURE — 85730 THROMBOPLASTIN TIME PARTIAL: CPT | Performed by: EMERGENCY MEDICINE

## 2020-05-20 PROCEDURE — 93010 ELECTROCARDIOGRAM REPORT: CPT | Performed by: INTERNAL MEDICINE

## 2020-05-20 PROCEDURE — 96375 TX/PRO/DX INJ NEW DRUG ADDON: CPT

## 2020-05-20 PROCEDURE — 85610 PROTHROMBIN TIME: CPT | Performed by: EMERGENCY MEDICINE

## 2020-05-20 RX ORDER — METOCLOPRAMIDE HYDROCHLORIDE 5 MG/ML
10 INJECTION INTRAMUSCULAR; INTRAVENOUS ONCE
Status: COMPLETED | OUTPATIENT
Start: 2020-05-20 | End: 2020-05-20

## 2020-05-20 RX ORDER — DIPHENHYDRAMINE HYDROCHLORIDE 50 MG/ML
25 INJECTION INTRAMUSCULAR; INTRAVENOUS ONCE
Status: COMPLETED | OUTPATIENT
Start: 2020-05-20 | End: 2020-05-20

## 2020-05-20 RX ORDER — SODIUM CHLORIDE 0.9 % (FLUSH) 0.9 %
10 SYRINGE (ML) INJECTION AS NEEDED
Status: DISCONTINUED | OUTPATIENT
Start: 2020-05-20 | End: 2020-05-20 | Stop reason: HOSPADM

## 2020-05-20 RX ADMIN — DIPHENHYDRAMINE HYDROCHLORIDE 25 MG: 50 INJECTION, SOLUTION INTRAMUSCULAR; INTRAVENOUS at 19:52

## 2020-05-20 RX ADMIN — METOCLOPRAMIDE 10 MG: 5 INJECTION, SOLUTION INTRAMUSCULAR; INTRAVENOUS at 19:52

## 2020-05-20 NOTE — PROGRESS NOTES
Outpatient Physical Therapy Ortho Progress Note       Patient Name: Ricardo Menchaca  : 1965  MRN: [id number]  Today's Date: 2020      Visit Date: 2020    Visit Dx:    ICD-10-CM ICD-9-CM   1. Cervicalgia M54.2 723.1   2. Cervical radiculopathy M54.12 723.4       Patient Active Problem List   Diagnosis   • Cervicalgia   • Cervical radiculopathy   • Morbid obesity with BMI of 40.0-44.9, adult (CMS/[facility])   • Former tobacco use        Past Medical History:   Diagnosis Date   • Depression    • Diabetes mellitus (CMS/[facility])    • Hypertension         Past Surgical History:   Procedure Laterality Date   • CARDIAC CATHETERIZATION  2015    Left Heart   • SPINAL FUSION                         PT Assessment/Plan     Row Name 20 1400          PT Assessment    Functional Limitations  Limitation in home management;Limitations in community activities;Limitations in functional capacity and performance;Performance in leisure activities;Performance in sport activities  -HR     Impairments  Pain;Motor function;Impaired sensory integrity;Impaired postural alignment;Range of motion  -HR     Assessment Comments  He has had periods of a few days at a time with no symptoms down his L arm. His hand is getting stronger and he isn't noticing the tingling very much anymore. He did have a headache today. but no other pain which is good. Discussed centralization of pain being a progression in healing. He would benefit from further PT visits to reaach goals more consistently.  -HR     Rehab Potential  Good  -HR     Patient/caregiver participated in establishment of treatment plan and goals  Yes  -HR     Patient would benefit from skilled therapy intervention  Yes  -HR        PT Plan    PT Frequency  2x/week;1x/week  -HR     Predicted Duration of Therapy Intervention (Therapy Eval)  4 more weeks  -HR     Planned CPT's?  PT RE-EVAL: 26397;PT MANUAL THERAPY EA 15 MIN: 62561;PT THER PROC EA 15 MIN: 20749;PT THER ACT EA 15 MIN:  42022;PT SELF CARE/HOME MGMT/TRAIN EA 15: 40445;PT ELECTRICAL STIM ATTD EA 15 MIN: 68595;PT ELECTRICAL STIM UNATTEND:   -HR     PT Plan Comments  Cont with manual techniques and progress scapular strengthening as able.   -HR       User Key  (r) = Recorded By, (t) = Taken By, (c) = Cosigned By    Initials Name Provider Type    HR Cherry Sanderson PT, DPT, RAFY Physical Therapist            OP Exercises     Row Name 05/20/20 1400 05/20/20 1300          Subjective Comments    Subjective Comments  He has a headache that is pretty bad.   -HR  --        Subjective Pain    Able to rate subjective pain?  yes  -HR  --     Pre-Treatment Pain Level  5  -HR  --        Total Minutes    25220 - PT Manual Therapy Minutes  --  45  -HR       User Key  (r) = Recorded By, (t) = Taken By, (c) = Cosigned By    Initials Name Provider Type    HR Cherry Sanderson, PT, DPT, RAFY Physical Therapist                      Manual Rx (last 36 hours)      Manual Treatments     Row Name 05/20/20 1300             Total Minutes    27199 - PT Manual Therapy Minutes  45  -HR         Manual Rx 1    Manual Rx 1 Location  prone- thoracic IASTM with ratchet bolster  -HR      Manual Rx 1 Type  STM  -HR      Manual Rx 1 Grade  2-3  -HR         Manual Rx 2    Manual Rx 2 Location  suboccipital release manual  -HR      Manual Rx 2 Grade  3  -HR         Manual Rx 3    Manual Rx 3 Location  manual rotation stretch to tolerance  -HR         Manual Rx 4    Manual Rx 4 Location  manual sidebending stretch to tolerance  -HR         Manual Rx 5    Manual Rx 5 Location  manual STM to pec minor   -HR         Manual Rx 6    Manual Rx 6 Location  posterior shoulder mobs on L  -HR        User Key  (r) = Recorded By, (t) = Taken By, (c) = Cosigned By    Initials Name Provider Type    HR Cherry Sanderson, PT, DPT, CLMIRIAM Physical Therapist          PT OP Goals     Row Name 05/20/20 1400          PT Short Term Goals    STG 1  Radicular symptoms  in LUE will not go past the shoulder.  -HR     STG 1 Progress  Ongoing;Partially Met  -HR     STG 1 Progress Comments  He has not had symptoms down the arm in 4 days  -HR     STG 2  Cervical ROM into rotation will allow him to look over each shoulder.   -HR     STG 2 Progress  Ongoing;Progressing  -HR     STG 2 Progress Comments  able to turn head more easily  -HR     STG 3  Report increased strength in L hand.   -HR     STG 3 Progress  Ongoing;Progressing  -HR     STG 3 Progress Comments  improving  -HR        Long Term Goals    LTG Date to Achieve  06/05/20  -HR     LTG 1  Pain will be centralized to neck and will be no greater than 5/10 at worst.   -HR     LTG 1 Progress  Ongoing  -HR     LTG 1 Progress Comments  It does seem to be centralizing as he has had days with no arm symptoms   -HR     LTG 2  He will report no headaches X 2 weeks.  -HR     LTG 2 Progress  Ongoing  -HR     LTG 2 Progress Comments  He dose have a headache today. No other pain though  -HR     LTG 3  L  strength will be at least 80 lbs.  -HR     LTG 3 Progress  Ongoing  -HR     LTG 3 Progress Comments  didn't test  -HR     LTG 4  He will be aware of improved posture and self correct.   -HR     LTG 4 Progress  Ongoing  -HR     LTG 5  Independent with comprehensive HEP for posture and LUE strengthening.   -HR     LTG 5 Progress  New  -HR        Time Calculation    PT Goal Re-Cert Due Date  06/19/20  -HR       User Key  (r) = Recorded By, (t) = Taken By, (c) = Cosigned By    Initials Name Provider Type    Cherry Hartmann, PT, DPT, CLT-STEPHY Physical Therapist          Therapy Education  Education Details: Explained that centralization of symptoms is a sign of progression in the healing process. We want to stop the symptoms that radiate into the UE first               Time Calculation:                    Cherry Sanderson, PT, DPT, CLT-STEPHY  5/20/2020

## 2020-05-21 NOTE — ED PROVIDER NOTES
Subjective   This 54-year-old male patient presents the emergency room complaining of a acute headache that started at about 515 with a sudden onset.  He rates the pain as a 9 out of 10 of intensity and he states that when he was in the 11th grade he used to have migraine headaches which were worse.  Since then he is not had problems with headaches.  He has felt nauseated he is felt a little blurry vision in both eyes denies any photophobia fever chills or cold symptoms.          Review of Systems   Constitutional: Negative for chills and fever.   HENT: Negative for congestion, rhinorrhea and sore throat.    Eyes: Positive for visual disturbance.   Respiratory: Negative for cough and shortness of breath.    Cardiovascular: Negative for chest pain.   Gastrointestinal: Positive for nausea. Negative for abdominal pain and vomiting.   Genitourinary: Negative.    Musculoskeletal: Positive for neck pain.        Patient states he has been having stiffness of his neck for about a week.   Neurological: Positive for headaches.   Psychiatric/Behavioral: Negative.        Past Medical History:   Diagnosis Date   • Depression    • Diabetes mellitus (CMS/[facility])    • Hypertension        Allergies   Allergen Reactions   • Penicillins Anaphylaxis   • Lortab [Hydrocodone-Acetaminophen] Angioedema   • Shrimp Angioedema       Past Surgical History:   Procedure Laterality Date   • CARDIAC CATHETERIZATION  06/27/2015    Left Heart   • SPINAL FUSION         Family History   Problem Relation Age of Onset   • Heart disease Mother    • Diabetes Mother    • Hypertension Mother    • Arthritis Father    • Cancer Father    • Diabetes Father    • Hypertension Father        Social History     Socioeconomic History   • Marital status:      Spouse name: Not on file   • Number of children: Not on file   • Years of education: Not on file   • Highest education level: Not on file   Tobacco Use   • Smoking status: Former Smoker   • Smokeless tobacco:  Never Used   Substance and Sexual Activity   • Alcohol use: No   • Drug use: No   • Sexual activity: Defer           Objective   Physical Exam   Constitutional: He is oriented to person, place, and time. He appears well-developed and well-nourished. He appears distressed.   HENT:   Head: Normocephalic and atraumatic.   Right Ear: External ear normal.   Left Ear: External ear normal.   Mouth/Throat: Oropharynx is clear and moist.   Eyes: Pupils are equal, round, and reactive to light. EOM are normal.   Neck: Normal range of motion. Neck supple.   Cardiovascular: Normal rate, regular rhythm and normal heart sounds.   Pulmonary/Chest: Effort normal and breath sounds normal.   Abdominal: Soft. Bowel sounds are normal. There is no tenderness.   Musculoskeletal: Normal range of motion.   Neurological: He is alert and oriented to person, place, and time. No cranial nerve deficit or sensory deficit. He exhibits normal muscle tone. Coordination normal.   Skin: Skin is warm and dry.   Psychiatric: He has a normal mood and affect.   Nursing note and vitals reviewed.      Procedures           ED Course                                           MDM  Number of Diagnoses or Management Options  Diagnosis management comments: Patient is still having headache after having an IV dose of Reglan and Benadryl which I thought might help a lot.  The patient I offered to give him something else for his headache but he states that he wants to go home.  He has not had a CAT scan yet I explained to him that the way his headache came on suddenly like that it could be a sign of an aneurysm rupturing and that he could be suffering from something is quite serious and could be he could die if he does not get a diagnosed he states she just wants to go home.  He is awake and alert and able to make this kind of decision so I will have the nurse sign and have him sign AMA forms.      Final diagnoses:   Intractable headache, unspecified chronicity  pattern, unspecified headache type   Left against medical advice            Kenroy Cavazos DO  05/20/20 2036

## 2020-05-22 ENCOUNTER — TREATMENT (OUTPATIENT)
Dept: PHYSICAL THERAPY | Facility: CLINIC | Age: 55
End: 2020-05-22

## 2020-05-22 DIAGNOSIS — M54.12 CERVICAL RADICULOPATHY: ICD-10-CM

## 2020-05-22 DIAGNOSIS — M54.2 CERVICALGIA: Primary | ICD-10-CM

## 2020-05-22 PROCEDURE — 97140 MANUAL THERAPY 1/> REGIONS: CPT | Performed by: PHYSICAL THERAPIST

## 2020-05-22 PROCEDURE — 97110 THERAPEUTIC EXERCISES: CPT | Performed by: PHYSICAL THERAPIST

## 2020-05-22 NOTE — PROGRESS NOTES
Outpatient Physical Therapy Ortho Treatment Note       Patient Name: Ricardo Menchaca  : 1965  MRN: [id number]  Today's Date: 2020      Visit Date: 2020    Visit Dx:    ICD-10-CM ICD-9-CM   1. Cervicalgia M54.2 723.1   2. Cervical radiculopathy M54.12 723.4       Patient Active Problem List   Diagnosis   • Cervicalgia   • Cervical radiculopathy   • Morbid obesity with BMI of 40.0-44.9, adult (CMS/[facility])   • Former tobacco use        Past Medical History:   Diagnosis Date   • Depression    • Diabetes mellitus (CMS/[facility])    • Hypertension         Past Surgical History:   Procedure Laterality Date   • CARDIAC CATHETERIZATION  2015    Left Heart   • SPINAL FUSION                         PT Assessment/Plan     Row Name 20 1600          PT Assessment    Assessment Comments  He has been feeling really good and then yesterday he hit a hole while mowing and it surendra him and irritated the L neck and the same burning pain returned down the LUE and hand. He had a good recovery after treatment today. Becoming more aware of isolating the correct muscles now to improve alignment is very important. His upper traps want to do it all.  -HR        PT Plan    PT Plan Comments  Cont with manual treatments and progression of HEP  -HR       User Key  (r) = Recorded By, (t) = Taken By, (c) = Cosigned By    Initials Name Provider Type    HR Cherry Sanderson, PT, DPT, CLT-STEPHY Physical Therapist            OP Exercises     Row Name 20 1600 20 1500          Subjective Comments    Subjective Comments  He felt like his old self until he hit a hole on the mower. He states he was being very careful and going slow and was fine for the first 45 minutes and then he hit a big hole that surendra him.  -HR  --        Subjective Pain    Able to rate subjective pain?  yes  -HR  --     Pre-Treatment Pain Level  8  -HR  --     Post-Treatment Pain Level  2  -HR  --        Total Minutes    51539 - PT Therapeutic  Exercise Minutes  15  -HR  --     41629 - PT Manual Therapy Minutes  --  30  -HR        Exercise 1    Exercise Name 1  scapular retraction with 1/2 bolster to spine leaning on wall  -HR  --     Cueing 1  Demo;Verbal  -HR  --        Exercise 2    Exercise Name 2  UE phasic with yellow band in same position against wall with 1/2 bolster to spine- focused on not allowing upper trap to fire.   -HR  --     Sets 2  8  -HR  --     Reps 2  5  -HR  --     Additional Comments  very tiring . He was surprised at the difference when he figured out how to isolate RTC instead of upper traps.   -HR  --       User Key  (r) = Recorded By, (t) = Taken By, (c) = Cosigned By    Initials Name Provider Type    HR Cherry Sanderson, PT, DPT, CLMIRIAM Physical Therapist                      Manual Rx (last 36 hours)      Manual Treatments     Row Name 05/22/20 1500             Total Minutes    00670 - PT Manual Therapy Minutes  30  -HR         Manual Rx 1    Manual Rx 1 Location  prone- thoracic IASTM with ratchet bolster  -HR      Manual Rx 1 Type  STM  -HR      Manual Rx 1 Grade  2-3  -HR         Manual Rx 2    Manual Rx 2 Location  suboccipital release manual  -HR      Manual Rx 2 Grade  3  -HR         Manual Rx 3    Manual Rx 3 Location  manual rotation stretch to tolerance  -HR         Manual Rx 4    Manual Rx 4 Location  manual sidebending stretch to tolerance  -HR         Manual Rx 5    Manual Rx 5 Location  manual STM to pec minor   -HR         Manual Rx 6    Manual Rx 6 Location  posterior shoulder mobs on L  -HR        User Key  (r) = Recorded By, (t) = Taken By, (c) = Cosigned By    Initials Name Provider Type    HR Cherry Sanderson, PT, DPT, CLKATERINE-STEPHY Physical Therapist          PT OP Goals     Row Name 05/22/20 1500          PT Short Term Goals    STG 1  Radicular symptoms in LUE will not go past the shoulder.  -HR     STG 1 Progress  Ongoing;Partially Met  -HR     STG 1 Progress Comments  better after treatment  -HR      STG 2  Cervical ROM into rotation will allow him to look over each shoulder.   -HR     STG 2 Progress  Ongoing;Progressing  -HR     STG 2 Progress Comments  better after treatment  -HR     STG 3  Report increased strength in L hand.   -HR     STG 3 Progress  Ongoing;Progressing  -HR        Long Term Goals    LTG Date to Achieve  06/05/20  -HR     LTG 1  Pain will be centralized to neck and will be no greater than 5/10 at worst.   -HR     LTG 1 Progress  Ongoing  -HR     LTG 2  He will report no headaches X 2 weeks.  -HR     LTG 2 Progress  Ongoing  -HR     LTG 3  L  strength will be at least 80 lbs.  -HR     LTG 3 Progress  Ongoing  -HR     LTG 4  He will be aware of improved posture and self correct.   -HR     LTG 4 Progress  Ongoing  -HR     LTG 5  Independent with comprehensive HEP for posture and LUE strengthening.   -HR     LTG 5 Progress  New  -HR        Time Calculation    PT Goal Re-Cert Due Date  06/19/20  -HR       User Key  (r) = Recorded By, (t) = Taken By, (c) = Cosigned By    Initials Name Provider Type    HR Cherry Sanderson, PT, DPT, RAFY Physical Therapist          Therapy Education  Education Details: Importance of isolating rTC muscles instead of allowing upper trap to fire constantly              Time Calculation:                    Cherry Sanderson PT, DPT, RAFY  5/22/2020

## 2020-05-29 ENCOUNTER — TREATMENT (OUTPATIENT)
Dept: PHYSICAL THERAPY | Facility: CLINIC | Age: 55
End: 2020-05-29

## 2020-05-29 DIAGNOSIS — M54.2 CERVICALGIA: Primary | ICD-10-CM

## 2020-05-29 DIAGNOSIS — M54.12 CERVICAL RADICULOPATHY: ICD-10-CM

## 2020-05-29 PROCEDURE — 97140 MANUAL THERAPY 1/> REGIONS: CPT | Performed by: PHYSICAL THERAPIST

## 2020-05-29 PROCEDURE — 97110 THERAPEUTIC EXERCISES: CPT | Performed by: PHYSICAL THERAPIST

## 2020-05-29 NOTE — PROGRESS NOTES
Outpatient Physical Therapy Ortho Treatment Note       Patient Name: Ricardo Menchaca  : 1965  MRN: [id number]  Today's Date: 2020      Visit Date: 2020    Visit Dx:    ICD-10-CM ICD-9-CM   1. Cervicalgia M54.2 723.1   2. Cervical radiculopathy M54.12 723.4       Patient Active Problem List   Diagnosis   • Cervicalgia   • Cervical radiculopathy   • Morbid obesity with BMI of 40.0-44.9, adult (CMS/[facility])   • Former tobacco use        Past Medical History:   Diagnosis Date   • Depression    • Diabetes mellitus (CMS/[facility])    • Hypertension         Past Surgical History:   Procedure Laterality Date   • CARDIAC CATHETERIZATION  2015    Left Heart   • SPINAL FUSION                         PT Assessment/Plan     Row Name 20 1600          PT Assessment    Assessment Comments  He has been ill and had to cancel his last appointment. He is wanting to get back to the gym but he continues to have pain in his neck quite a bit. He is very upper trap dominant which keeps becoming apparent when I add a different exercise for scapular strengthening.   -HR        PT Plan    PT Plan Comments  Cont to work on decreasing trap activation  -HR       User Key  (r) = Recorded By, (t) = Taken By, (c) = Cosigned By    Initials Name Provider Type    HR Cherry Sanderson, PT, DPT, CLT-STEPHY Physical Therapist            OP Exercises     Row Name 20 1600 20 1500          Subjective Comments    Subjective Comments  He has had a stomach bug.  -HR  --        Subjective Pain    Able to rate subjective pain?  yes  -HR  --     Pre-Treatment Pain Level  8  -HR  --     Post-Treatment Pain Level  6  -HR  --        Total Minutes    00672 - PT Therapeutic Exercise Minutes  15  -HR  --     34671 - PT Manual Therapy Minutes  --  30  -HR        Exercise 1    Exercise Name 1  scapular retraction with 1/2 bolster to spine leaning on wall  -HR  --     Cueing 1  Demo;Verbal  -HR  --        Exercise 2    Exercise Name 2  BUE  slides with forearms on wall  -HR  --     Cueing 2  Verbal;Demo;Tactile  -HR  --        Exercise 3    Exercise Name 3  wall angels focusing on scapular control  -HR  --        Exercise 4    Exercise Name 4  serratus punches with 4# wt in supine  -HR  --       User Key  (r) = Recorded By, (t) = Taken By, (c) = Cosigned By    Initials Name Provider Type    HR Cherry Sanderson, PT, DPT, RAFY Physical Therapist                      Manual Rx (last 36 hours)      Manual Treatments     Row Name 05/29/20 1500             Total Minutes    77443 - PT Manual Therapy Minutes  30  -HR         Manual Rx 1    Manual Rx 1 Location  prone- thoracic IASTM with ratchet bolster  -HR      Manual Rx 1 Type  STM  -HR      Manual Rx 1 Grade  2-3  -HR         Manual Rx 2    Manual Rx 2 Location  suboccipital release manual  -HR      Manual Rx 2 Grade  3  -HR         Manual Rx 3    Manual Rx 3 Location  manual rotation stretch to tolerance  -HR         Manual Rx 4    Manual Rx 4 Location  manual sidebending stretch to tolerance  -HR         Manual Rx 5    Manual Rx 5 Location  manual STM to pec minor   -HR         Manual Rx 6    Manual Rx 6 Location  posterior shoulder mobs on L  -HR        User Key  (r) = Recorded By, (t) = Taken By, (c) = Cosigned By    Initials Name Provider Type    HR Cherry Sanderson, PT, DPT, RAFY Physical Therapist          PT OP Goals     Row Name 05/29/20 1600          PT Short Term Goals    STG 1  Radicular symptoms in KODY will not go past the shoulder.  -HR     STG 1 Progress  Ongoing;Partially Met  -HR     STG 2  Cervical ROM into rotation will allow him to look over each shoulder.   -HR     STG 2 Progress  Ongoing;Progressing  -HR     STG 3  Report increased strength in L hand.   -HR     STG 3 Progress  Ongoing;Progressing  -HR        Long Term Goals    LTG Date to Achieve  06/05/20  -HR     LTG 1  Pain will be centralized to neck and will be no greater than 5/10 at worst.   -HR     LTG 1  Progress  Ongoing  -HR     LTG 2  He will report no headaches X 2 weeks.  -HR     LTG 2 Progress  Ongoing  -HR     LTG 3  L  strength will be at least 80 lbs.  -HR     LTG 3 Progress  Ongoing  -HR     LTG 4  He will be aware of improved posture and self correct.   -HR     LTG 4 Progress  Ongoing  -HR     LTG 5  Independent with comprehensive HEP for posture and LUE strengthening.   -HR     LTG 5 Progress  New  -HR        Time Calculation    PT Goal Re-Cert Due Date  06/19/20  -HR       User Key  (r) = Recorded By, (t) = Taken By, (c) = Cosigned By    Initials Name Provider Type    Cherry Hartmann, PT, DPT, CLT-STEPHY Physical Therapist          Therapy Education  Education Details: he can use the theraband at home and do serratus punches              Time Calculation:                    Cherry Sanderson, PT, DPT, CLT-STEPHY  5/29/2020

## 2020-06-03 ENCOUNTER — TREATMENT (OUTPATIENT)
Dept: PHYSICAL THERAPY | Facility: CLINIC | Age: 55
End: 2020-06-03

## 2020-06-03 DIAGNOSIS — M54.2 CERVICALGIA: Primary | ICD-10-CM

## 2020-06-03 DIAGNOSIS — M54.12 CERVICAL RADICULOPATHY: ICD-10-CM

## 2020-06-03 PROCEDURE — 97110 THERAPEUTIC EXERCISES: CPT | Performed by: PHYSICAL THERAPIST

## 2020-06-03 PROCEDURE — 97140 MANUAL THERAPY 1/> REGIONS: CPT | Performed by: PHYSICAL THERAPIST

## 2020-06-03 NOTE — PROGRESS NOTES
"Outpatient Physical Therapy Ortho Treatment Note       Patient Name: Ricardo Menchaca  : 1965  MRN: [id number]  Today's Date: 6/3/2020      Visit Date: 2020    Visit Dx:    ICD-10-CM ICD-9-CM   1. Cervicalgia M54.2 723.1   2. Cervical radiculopathy M54.12 723.4       Patient Active Problem List   Diagnosis   • Cervicalgia   • Cervical radiculopathy   • Morbid obesity with BMI of 40.0-44.9, adult (CMS/[facility])   • Former tobacco use        Past Medical History:   Diagnosis Date   • Depression    • Diabetes mellitus (CMS/[facility])    • Hypertension         Past Surgical History:   Procedure Laterality Date   • CARDIAC CATHETERIZATION  2015    Left Heart   • SPINAL FUSION                         PT Assessment/Plan     Row Name 20 1600          PT Assessment    Assessment Comments  He is really wanting to return to working out at the gym.  Adding some weights to exercises today made him feel better.   -HR        PT Plan    PT Plan Comments  Progress strengthening while maintaining postural alignment.  -HR       User Key  (r) = Recorded By, (t) = Taken By, (c) = Cosigned By    Initials Name Provider Type    HR Cherry Sanderson, PT, DPT, CLT-STEPHY Physical Therapist            OP Exercises     Row Name 20 1600 20 1500          Subjective Comments    Subjective Comments  He says his L arm and hand are doing good. He really wants to go back to the gym.  -HR  --        Subjective Pain    Able to rate subjective pain?  yes  -HR  --     Pre-Treatment Pain Level  5  -HR  --     Post-Treatment Pain Level  2  -HR  --        Total Minutes    59183 - PT Therapeutic Exercise Minutes  30  -HR  --     75167 - PT Manual Therapy Minutes  --  15  -HR        Exercise 1    Exercise Name 1  supine- serratus punches with red tband   -HR  --     Cueing 1  Demo;Verbal  -HR  --     Sets 1  3  -HR  --     Reps 1  10  -HR  --        Exercise 2    Exercise Name 2  supine \"Y\" with yellow tband  -HR  --     Cueing 2  " Verbal;Demo;Tactile  -HR  --     Sets 2  2  -HR  --     Reps 2  10  -HR  --     Additional Comments  had to keep cueing to maintain more scaption instead of abduction. had some pain in L shoulder   -HR  --        Exercise 3    Exercise Name 3  supine T's with yellow tband  -HR  --     Sets 3  3  -HR  --     Reps 3  10  -HR  --        Exercise 4    Exercise Name 4  scapular retraction B pull down L4 [employer] sitting on stool  -HR  --     Cueing 4  Verbal;Tactile  -HR  --     Sets 4  3  -HR  --     Reps 4  10  -HR  --     Additional Comments  felt good doing some weight resistance exercise  -HR  --       User Key  (r) = Recorded By, (t) = Taken By, (c) = Cosigned By    Initials Name Provider Type    HR Cherry Sanderson, PT, DPT, CLT-STEPHY Physical Therapist                      Manual Rx (last 36 hours)      Manual Treatments     Row Name 06/03/20 1500             Total Minutes    87264 - PT Manual Therapy Minutes  15  -HR         Manual Rx 1    Manual Rx 1 Location  prone- thoracic IASTM with pink bolster  -HR      Manual Rx 1 Type  STM  -HR      Manual Rx 1 Grade  2-3  -HR      Manual Rx 1 Duration  15  -HR        User Key  (r) = Recorded By, (t) = Taken By, (c) = Cosigned By    Initials Name Provider Type    HR Cherry Sanderson, PT, DPT, CLT-STEPHY Physical Therapist          PT OP Goals     Row Name 06/03/20 1600          PT Short Term Goals    STG 1  Radicular symptoms in LUE will not go past the shoulder.  -HR     STG 1 Progress  Ongoing;Partially Met  -HR     STG 2  Cervical ROM into rotation will allow him to look over each shoulder.   -HR     STG 2 Progress  Ongoing;Progressing  -HR     STG 3  Report increased strength in L hand.   -HR     STG 3 Progress  Ongoing;Progressing  -HR        Long Term Goals    LTG Date to Achieve  06/05/20  -HR     LTG 1  Pain will be centralized to neck and will be no greater than 5/10 at worst.   -HR     LTG 1 Progress  Ongoing  -HR     LTG 2  He will report no headaches X 2  weeks.  -HR     LTG 2 Progress  Ongoing  -HR     LTG 3  L  strength will be at least 80 lbs.  -HR     LTG 3 Progress  Ongoing  -HR     LTG 4  He will be aware of improved posture and self correct.   -HR     LTG 4 Progress  Ongoing  -HR     LTG 5  Independent with comprehensive HEP for posture and LUE strengthening.   -HR     LTG 5 Progress  New  -HR        Time Calculation    PT Goal Re-Cert Due Date  06/19/20  -HR       User Key  (r) = Recorded By, (t) = Taken By, (c) = Cosigned By    Initials Name Provider Type    Cherry Hartmann, PT, DPT, CLT-STEPHY Physical Therapist          Therapy Education  Education Details: talked about multiple safe options to do when returning to gym workouts              Time Calculation:                    Cherry Sanderson PT, DPT, CLT-STEPHY  6/3/2020

## 2020-06-05 ENCOUNTER — TREATMENT (OUTPATIENT)
Dept: PHYSICAL THERAPY | Facility: CLINIC | Age: 55
End: 2020-06-05

## 2020-06-05 DIAGNOSIS — M54.12 CERVICAL RADICULOPATHY: ICD-10-CM

## 2020-06-05 DIAGNOSIS — M54.2 CERVICALGIA: Primary | ICD-10-CM

## 2020-06-05 PROCEDURE — 97140 MANUAL THERAPY 1/> REGIONS: CPT | Performed by: PHYSICAL THERAPIST

## 2020-06-05 NOTE — PROGRESS NOTES
Outpatient Physical Therapy Ortho Treatment Note       Patient Name: Ricardo Menchaca  : 1965  MRN: [id number]  Today's Date: 2020      Visit Date: 2020    Visit Dx:    ICD-10-CM ICD-9-CM   1. Cervicalgia M54.2 723.1   2. Cervical radiculopathy M54.12 723.4       Patient Active Problem List   Diagnosis   • Cervicalgia   • Cervical radiculopathy   • Morbid obesity with BMI of 40.0-44.9, adult (CMS/[facility])   • Former tobacco use        Past Medical History:   Diagnosis Date   • Depression    • Diabetes mellitus (CMS/[facility])    • Hypertension         Past Surgical History:   Procedure Laterality Date   • CARDIAC CATHETERIZATION  2015    Left Heart   • SPINAL FUSION                         PT Assessment/Plan     Row Name 20 152          PT Assessment    Assessment Comments  His treatment was cut short due to error of PTA, and he was unable to stay beyond scheduled time when asked.  He did present with a recent flare without known cause.  He had pain traveling back down his left arm into the hand with tingling.  However, after treatment his symptoms did centralize to his upper left arm above elbow without tingling in the hand.  He responded well to suboccipital release and cervical distraction with centralization,   -SHAQUILLE        PT Plan    PT Plan Comments  We will assess symptoms and focus on centralization if his radicular symptoms persist.  If they are sri we will continue with scapular strengthening.,   -SHAQUILLE       User Key  (r) = Recorded By, (t) = Taken By, (c) = Cosigned By    Initials Name Provider Type    Ralph Patel PTA Physical Therapy Assistant            OP Exercises     Row Name 20 7565             Subjective Comments    Subjective Comments  Patient reports his neck pain has been pretty bad today without known cause.   He reports his pain is traveling to his left arm into the shoulder and hand with tingling.  He reports this has been getting better for he has not had  symptoms for the last week, but it is flared back up.   -SHAQUILLE         Subjective Pain    Able to rate subjective pain?  yes  -SHAQUILLE      Pre-Treatment Pain Level  8 pain down to hand with hand tingling   -SHAQUILLE      Post-Treatment Pain Level  7 pain to upper left arm, but not in hand and no tingling   -SHAQUILLE         Total Minutes    23231 - PT Manual Therapy Minutes  30  -SHAQUILLE        User Key  (r) = Recorded By, (t) = Taken By, (c) = Cosigned By    Initials Name Provider Type    SHAQUILLE Ralph Rutledge PTA Physical Therapy Assistant                      Manual Rx (last 36 hours)      Manual Treatments     Row Name 06/05/20 1529             Total Minutes    99307 - PT Manual Therapy Minutes  30  -SHAQUILLE         Manual Rx 1    Manual Rx 1 Location  prone thoracic   -SHAQUILLE      Manual Rx 1 Type  IASTM with blue ridged foam roller  -SHAQUILLE      Manual Rx 1 Grade  mod   -SHAQUILLE         Manual Rx 2    Manual Rx 2 Location  prone upper thoracic   -SHAQUILLE      Manual Rx 2 Type  extension mobilizations with and without foam roller   -SHAQUILLE      Manual Rx 2 Grade  2  -SHAQUILLE         Manual Rx 3    Manual Rx 3 Location  prone UT,LS  -SHAQUILLE      Manual Rx 3 Type  STM   -SHAQUILLE      Manual Rx 3 Grade  moderate   -SHAQUILLE         Manual Rx 4    Manual Rx 4 Location  supine suboccipital release   -SHAQUILLE      Manual Rx 4 Grade  moderate   -SHAQUILLE      Manual Rx 4 Duration  helped centralize pain   -SHAQUILLE         Manual Rx 5    Manual Rx 5 Location  supine cervical distraction   -SHAQUILLE      Manual Rx 5 Grade  mod   -SHAQUILLE      Manual Rx 5 Duration  helped centralize pain to shoulder and out of hand   -SHAQUILLE         Manual Rx 6    Manual Rx 6 Location  manual cervical rotation stretches   -SHAQUILLE      Manual Rx 6 Grade  3 each side   -SHAQUILLE         Manual Rx 7    Manual Rx 7 Location  L GHJ   -SHAQUILLE      Manual Rx 7 Type  LAD   -SHAQUILLE      Additional Treatment?  Yes  -SHAQUILLE         Manual Rx 8    Manual Rx 8 Location  L GHJ   -SHAQUILLE      Manual Rx 8 Type  posterior glides   -SHAQUILLE      Manual Rx 8 Duration  1-2   -SHAQUILLE         User Key  (r) = Recorded By, (t) = Taken By, (c) = Cosigned By    Initials Name Provider Type    Ralph Patel PTA Physical Therapy Assistant          PT OP Goals     Row Name 06/05/20 1529          PT Short Term Goals    STG Date to Achieve  05/06/20  -SHAQUILLE     STG 1  Radicular symptoms in LUE will not go past the shoulder.  -SHAQUILLE     STG 1 Progress  Ongoing;Partially Met  -SHAQUILLE     STG 1 Progress Comments  he did present wit pain down to the left hand and tingling in hand, but after treatment pain to only to upper left arm above elbow   -SHAQUILLE     STG 2  Cervical ROM into rotation will allow him to look over each shoulder.   -SHAQUILLE     STG 2 Progress  Ongoing;Progressing  -SHAQUILLE     STG 3  Report increased strength in L hand.   -SHAQUILLE     STG 3 Progress  Ongoing;Progressing  -SHAQUILLE        Long Term Goals    LTG Date to Achieve  06/05/20  -SHAQUILLE     LTG 1  Pain will be centralized to neck and will be no greater than 5/10 at worst.   -SHAQUILLE     LTG 1 Progress  Ongoing  -SHAQUILLE     LTG 2  He will report no headaches X 2 weeks.  -SHAQUILLE     LTG 2 Progress  Ongoing  -SHAQUILLE     LTG 3  L  strength will be at least 80 lbs.  -SHAQUILLE     LTG 3 Progress  Ongoing  -SHAQUILLE     LTG 4  He will be aware of improved posture and self correct.   -SHAQUILLE     LTG 4 Progress  Ongoing  -SHAQUILLE     LTG 5  Independent with comprehensive HEP for posture and LUE strengthening.   -SHAQUILLE     LTG 5 Progress  New  -SHAQUILLE        Time Calculation    PT Goal Re-Cert Due Date  06/19/20  -SHAQUILLE       User Key  (r) = Recorded By, (t) = Taken By, (c) = Cosigned By    Initials Name Provider Type    Ralph Patel PTA Physical Therapy Assistant          Therapy Education  Given: Symptoms/condition management  Program: Reinforced  How Provided: Verbal  Provided to: Patient  Level of Understanding: Verbalized              Time Calculation:   Total Timed Code Minutes- PT: 30 minute(s)                Ralph Rutledge PTA  6/5/2020

## 2020-06-12 ENCOUNTER — TREATMENT (OUTPATIENT)
Dept: PHYSICAL THERAPY | Facility: CLINIC | Age: 55
End: 2020-06-12

## 2020-06-12 DIAGNOSIS — M54.12 CERVICAL RADICULOPATHY: ICD-10-CM

## 2020-06-12 DIAGNOSIS — M54.2 CERVICALGIA: Primary | ICD-10-CM

## 2020-06-12 PROCEDURE — 97140 MANUAL THERAPY 1/> REGIONS: CPT | Performed by: PHYSICAL THERAPIST

## 2020-06-12 NOTE — PROGRESS NOTES
Outpatient Physical Therapy Ortho Treatment Note       Patient Name: Ricardo Menchaca  : 1965  MRN: [id number]  Today's Date: 2020      Visit Date: 2020    Visit Dx:    ICD-10-CM ICD-9-CM   1. Cervicalgia M54.2 723.1   2. Cervical radiculopathy M54.12 723.4       Patient Active Problem List   Diagnosis   • Cervicalgia   • Cervical radiculopathy   • Morbid obesity with BMI of 40.0-44.9, adult (CMS/[facility])   • Former tobacco use        Past Medical History:   Diagnosis Date   • Depression    • Diabetes mellitus (CMS/[facility])    • Hypertension         Past Surgical History:   Procedure Laterality Date   • CARDIAC CATHETERIZATION  2015    Left Heart   • SPINAL FUSION                         PT Assessment/Plan     Row Name 20 1500          PT Assessment    Assessment Comments  He couldn't relax during manualk treatment and reflexivelky tensed up constantly. Pain in back of head and L upper trap severe. Stopped treatment for today. Too irritated from car ride to and from [place] this week and stress about family member.  -HR        PT Plan    PT Plan Comments  Resume treatments next week  -HR       User Key  (r) = Recorded By, (t) = Taken By, (c) = Cosigned By    Initials Name Provider Type    HR Cherry Sanderson, PT, DPT, CLMIRIAM Physical Therapist            OP Exercises     Row Name 20 1500             Total Minutes    83568 - PT Manual Therapy Minutes  25  -HR        User Key  (r) = Recorded By, (t) = Taken By, (c) = Cosigned By    Initials Name Provider Type    HR Cherry Sanderson, PT, DPT, CLT-STEPHY Physical Therapist                      Manual Rx (last 36 hours)      Manual Treatments     Row Name 20 1500             Total Minutes    07865 - PT Manual Therapy Minutes  25  -HR         Manual Rx 1    Manual Rx 1 Location  prone thoracic   -HR      Manual Rx 1 Type  IASTM with blue ridged foam roller  -HR      Manual Rx 1 Grade  mod   -HR         Manual Rx 2    Manual Rx 2  Location  prone upper thoracic   -HR      Manual Rx 2 Type  extension mobilizations with and without foam roller   -HR      Manual Rx 2 Grade  2  -HR         Manual Rx 4    Manual Rx 4 Location  supine suboccipital release   -HR      Manual Rx 4 Grade  moderate   -HR      Manual Rx 4 Duration  helped centralize pain   -HR         Manual Rx 5    Manual Rx 5 Location  supine cervical distraction   -HR      Manual Rx 5 Grade  mod   -HR      Manual Rx 5 Duration  helped centralize pain to shoulder and out of hand   -HR        User Key  (r) = Recorded By, (t) = Taken By, (c) = Cosigned By    Initials Name Provider Type    HR Cherry Sanderson, PT, DPT, CLT-STEPHY Physical Therapist          PT OP Goals     Row Name 06/12/20 1500          PT Short Term Goals    STG 1  Radicular symptoms in LUE will not go past the shoulder.  -HR     STG 1 Progress  Ongoing;Partially Met  -HR     STG 2  Cervical ROM into rotation will allow him to look over each shoulder.   -HR     STG 2 Progress  Ongoing;Progressing  -HR     STG 3  Report increased strength in L hand.   -HR     STG 3 Progress  Ongoing;Progressing  -HR        Long Term Goals    LTG Date to Achieve  06/05/20  -HR     LTG 1  Pain will be centralized to neck and will be no greater than 5/10 at worst.   -HR     LTG 1 Progress  Ongoing  -HR     LTG 2  He will report no headaches X 2 weeks.  -HR     LTG 2 Progress  Ongoing  -HR     LTG 3  L  strength will be at least 80 lbs.  -HR     LTG 3 Progress  Ongoing  -HR     LTG 4  He will be aware of improved posture and self correct.   -HR     LTG 4 Progress  Ongoing  -HR     LTG 5  Independent with comprehensive HEP for posture and LUE strengthening.   -HR     LTG 5 Progress  New  -HR        Time Calculation    PT Goal Re-Cert Due Date  06/19/20  -HR       User Key  (r) = Recorded By, (t) = Taken By, (c) = Cosigned By    Initials Name Provider Type    HR Cherry Sanderson, PT, DPT, CLT-STEPHY Physical Therapist           Therapy Education  Education Details: ice/heat and take NSAIDs              Time Calculation:                    Cherry Sanderson, PT, DPT, CLT-STEPHY  6/12/2020

## 2020-06-17 ENCOUNTER — TELEPHONE (OUTPATIENT)
Dept: NEUROSURGERY | Facility: CLINIC | Age: 55
End: 2020-06-17

## 2020-06-17 DIAGNOSIS — M54.2 CERVICALGIA: Primary | ICD-10-CM

## 2020-06-17 DIAGNOSIS — M54.12 CERVICAL RADICULOPATHY: ICD-10-CM

## 2020-06-17 NOTE — TELEPHONE ENCOUNTER
Kenneth has put an order in for Ricardo to have an x-ray of his cervical spine, I called patient and explained he needs to go by imaging to have this x-ray, he understood and states he will have this done.

## 2020-06-30 ENCOUNTER — OFFICE VISIT (OUTPATIENT)
Dept: NEUROSURGERY | Facility: CLINIC | Age: 55
End: 2020-06-30

## 2020-06-30 ENCOUNTER — HOSPITAL ENCOUNTER (OUTPATIENT)
Dept: GENERAL RADIOLOGY | Facility: HOSPITAL | Age: 55
Discharge: HOME | End: 2020-06-30
Admitting: NURSE PRACTITIONER

## 2020-06-30 VITALS
DIASTOLIC BLOOD PRESSURE: 78 MMHG | BODY MASS INDEX: 43.96 KG/M2 | WEIGHT: 314 LBS | SYSTOLIC BLOOD PRESSURE: 134 MMHG | HEIGHT: 71 IN

## 2020-06-30 DIAGNOSIS — M25.512 ACUTE PAIN OF LEFT SHOULDER: ICD-10-CM

## 2020-06-30 DIAGNOSIS — E66.01 MORBID OBESITY WITH BMI OF 40.0-44.9, ADULT (HCC): ICD-10-CM

## 2020-06-30 DIAGNOSIS — M54.2 CERVICALGIA: Primary | ICD-10-CM

## 2020-06-30 DIAGNOSIS — Z87.891 FORMER TOBACCO USE: ICD-10-CM

## 2020-06-30 DIAGNOSIS — M54.12 CERVICAL RADICULOPATHY: ICD-10-CM

## 2020-06-30 PROCEDURE — 99213 OFFICE O/P EST LOW 20 MIN: CPT | Performed by: NEUROLOGICAL SURGERY

## 2020-06-30 PROCEDURE — 72052 X-RAY EXAM NECK SPINE 6/>VWS: CPT

## 2020-06-30 RX ORDER — OLMESARTAN MEDOXOMIL 40 MG/1
40 TABLET ORAL DAILY
COMMUNITY
Start: 2020-06-16 | End: 2022-01-14

## 2020-06-30 RX ORDER — LABETALOL 100 MG/1
100 TABLET, FILM COATED ORAL 2 TIMES DAILY
COMMUNITY
Start: 2020-06-16 | End: 2022-01-14

## 2020-06-30 RX ORDER — FUROSEMIDE 20 MG/1
20 TABLET ORAL DAILY
COMMUNITY
Start: 2020-06-04 | End: 2023-02-06
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
## 2020-07-09 ENCOUNTER — APPOINTMENT (OUTPATIENT)
Dept: MRI IMAGING | Facility: HOSPITAL | Age: 55
End: 2020-07-09

## 2021-04-27 ENCOUNTER — HOSPITAL ENCOUNTER (EMERGENCY)
Age: 56
Discharge: HOME | End: 2021-04-27
Payer: MEDICARE

## 2021-04-27 ENCOUNTER — APPOINTMENT (OUTPATIENT)
Dept: GENERAL RADIOLOGY | Age: 56
End: 2021-04-27
Payer: MEDICARE

## 2021-04-27 VITALS
WEIGHT: 315 LBS | HEART RATE: 80 BPM | HEIGHT: 72 IN | OXYGEN SATURATION: 98 % | BODY MASS INDEX: 42.66 KG/M2 | RESPIRATION RATE: 20 BRPM | SYSTOLIC BLOOD PRESSURE: 160 MMHG | TEMPERATURE: 98 F | DIASTOLIC BLOOD PRESSURE: 88 MMHG

## 2021-04-27 DIAGNOSIS — M25.421 JOINT EFFUSION OF ELBOW, RIGHT: ICD-10-CM

## 2021-04-27 DIAGNOSIS — M25.521 RIGHT ELBOW PAIN: Primary | ICD-10-CM

## 2021-04-27 DIAGNOSIS — Z87.39 HISTORY OF GOUT: ICD-10-CM

## 2021-04-27 PROCEDURE — 96372 THER/PROPH/DIAG INJ SC/IM: CPT

## 2021-04-27 PROCEDURE — 6360000002 HC RX W HCPCS: Performed by: NURSE PRACTITIONER

## 2021-04-27 PROCEDURE — 73080 X-RAY EXAM OF ELBOW: CPT

## 2021-04-27 PROCEDURE — 99285 EMERGENCY DEPT VISIT HI MDM: CPT

## 2021-04-27 RX ORDER — METHYLPREDNISOLONE 4 MG/1
TABLET ORAL
Qty: 1 KIT | Refills: 0 | Status: SHIPPED | OUTPATIENT
Start: 2021-04-27 | End: 2021-05-03

## 2021-04-27 RX ORDER — OXYCODONE HYDROCHLORIDE AND ACETAMINOPHEN 5; 325 MG/1; MG/1
1 TABLET ORAL EVERY 6 HOURS PRN
Qty: 10 TABLET | Refills: 0 | Status: SHIPPED | OUTPATIENT
Start: 2021-04-27 | End: 2021-04-30

## 2021-04-27 RX ORDER — KETOROLAC TROMETHAMINE 30 MG/ML
30 INJECTION, SOLUTION INTRAMUSCULAR; INTRAVENOUS ONCE
Status: COMPLETED | OUTPATIENT
Start: 2021-04-27 | End: 2021-04-27

## 2021-04-27 RX ORDER — ATENOLOL 50 MG/1
100 TABLET ORAL DAILY
COMMUNITY

## 2021-04-27 RX ADMIN — KETOROLAC TROMETHAMINE 30 MG: 30 INJECTION, SOLUTION INTRAMUSCULAR; INTRAVENOUS at 21:41

## 2021-04-27 ASSESSMENT — PAIN DESCRIPTION - LOCATION: LOCATION: ELBOW

## 2021-04-28 NOTE — ED PROVIDER NOTES
[facility]  eMERGENCY dEPARTMENT eNCOUnter      Pt Name: Sung Sykes  MRN: [id number]  [place] 1965  Date of evaluation: 4/27/2021  Provider: Maury Celeste, [address]       Chief Complaint   Patient presents with    Joint Swelling     right elbow pain \"ithink it is bursitis\"         HISTORY OF PRESENT ILLNESS   (Location/Symptom, Timing/Onset,Context/Setting, Quality, Duration, Modifying Factors, Severity)  Note limiting factors. Alia Mendez a 50 y.o. male who presents to the emergency department for evaluation of joint swelling. Pt tells me that he has had swelling and pain along his right elbow over past week. He denies specific injury. He has had no fevers or vomiting. He gives history of past problems with gout. He does not endorse any numbness or weakness of right upper extremity. [facility]    Nursing Notes were reviewed. REVIEW OF SYSTEMS    (2-9 systems for level 4, 10 or more for level 5)     Review of Systems   Constitutional: Negative for fever. Musculoskeletal: Positive for arthralgias (right elbow). Neurological: Negative for weakness and numbness. A complete review of systems was performed and is negative except as noted above in the HPI.        PAST MEDICAL HISTORY     Past Medical History:   Diagnosis Date    Anxiety     Arthritis     Chronic back pain     Controlled type 2 diabetes mellitus without complication, without long-term current use of insulin ([address] 1/5/2017    Depression     Diabetes mellitus ([address]     Gout     Hyperlipidemia     Hypertension     Obesity     Umbilical hernia          SURGICAL HISTORY       Past Surgical History:   Procedure Laterality Date    BACK SURGERY      COLONOSCOPY      LUMBAR [address] SURGERY  2008    OTHER SURGICAL HISTORY      cat scratch fever in left groin    REPAIR UMBILICAL SHADIA,1+G/E,OUNKO N/A 3/21/2614    HERNIA UMBILICAL REPAIR WITH MESH  performed by Mary Lugo MD at [facility] GASTROINTESTINAL ENDOSCOPY           CURRENT MEDICATIONS       Discharge Medication List as of 4/27/2021 10:00 PM      CONTINUE these medications which have NOT CHANGED    Details   atenolol (TENORMIN) 50 MG tablet Take 100 mg by mouth dailyHistorical Med      amLODIPine (NORVASC) 5 MG tablet Take 2 tablets by mouth daily, Disp-30 tablet, R-3Print      hydrochlorothiazide (HYDRODIURIL) 25 MG tablet Take 1 tablet by mouth daily, Disp-30 tablet, R-1Print      cloNIDine (CATAPRES) 0.3 MG tablet TAKE THREE TABLETS BY MOUTH TWICE A DAY, Disp-180 tablet, R-5Normal      amitriptyline (ELAVIL) 50 MG tablet Take 50 mg by mouth nightly      colchicine (COLCRYS) 0.6 MG tablet Take 0.6 mg by mouth daily as needed Indications: Gout       diclofenac (VOLTAREN) 75 MG EC tablet Take 75 mg by mouth 2 times daily Indications: Arthritis       metFORMIN (GLUCOPHAGE) 1000 MG tablet Take 1,000 mg by mouth 2 times daily (with meals) Indications: Diabetes       prazosin (MINIPRESS) 2 MG capsule Take 2 mg by mouth nightly Indications: Frightening Dreams       simvastatin (ZOCOR) 40 MG tablet Take 1 tablet by mouth every evening., Disp-30 tablet, R-5      glucose monitoring kit (FREESTYLE) monitoring kit DAILY PRN Starting 3/19/2015, Until Discontinued, Disp-1 kit, R-0, NormalDX:250.00      glucose blood VI test strips (EXACTECH TEST) strip DAILY Starting 3/19/2015, Until Discontinued, Disp-100 each, R-3, NormalTest sugars daily and prn DX:250.00      Lancets MISC Disp-100 each, R-3, NormalDX: 250.00  Test sugar daily and prn             ALLERGIES     Lortab [hydrocodone-acetaminophen] and Penicillins    FAMILY HISTORY       Family History   Problem Relation Age of Onset    Diabetes Mother     High Blood Pressure Mother     Cancer Father           SOCIAL HISTORY       Social History     Socioeconomic History    Marital status:      Spouse name: Not on file    Number of children: Not on file    Years of education: Not on file   Annei Reagan Highest education level: Not on file   Occupational History    Not on file   Social Needs    Financial resource strain: Not on file    Food insecurity     Worry: Not on file     Inability: Not on file    Transportation needs     Medical: Not on file     Non-medical: Not on file   Tobacco Use    Smoking status: Former Smoker     Quit date: 1/20/2011     Years since quitting: 10.2    Smokeless tobacco: Never Used   Substance and Sexual Activity    Alcohol use: No    Drug use: No    Sexual activity: Not on file   Lifestyle    Physical activity     Days per week: Not on file     Minutes per session: Not on file    Stress: Not on file   Relationships    Social connections     Talks on phone: Not on file     Gets together: Not on file     Attends Religion service: Not on file     Active member of club or organization: Not on file     Attends meetings of clubs or organizations: Not on file     Relationship status: Not on file    Intimate partner violence     Fear of current or ex partner: Not on file     Emotionally abused: Not on file     Physically abused: Not on file     Forced sexual activity: Not on file   Other Topics Concern    Not on file   Social History Narrative    Not on file       SCREENINGS    Shmuel Coma Scale  Eye Opening: Spontaneous  Best Verbal Response: Oriented  Best Motor Response: Obeys commands  [place] Coma Scale Score: 15        PHYSICAL EXAM    (up to 7 for level 4, 8 or more for level 5)     ED Triage Vitals [04/27/21 2021]   BP Temp Temp src Pulse Resp SpO2 Height Weight   (!) 160/88 98 °F (36.7 °C) -- 80 20 98 % 6' (1.829 m) (!) 330 lb (149.7 kg)       Physical Exam  Vitals signs reviewed. HENT:      Head: Normocephalic. Right Ear: External ear normal.      Left Ear: External ear normal.   Neck:      Musculoskeletal: Normal range of motion. Cardiovascular:      Rate and Rhythm: Normal rate.    Pulmonary:      Effort: Pulmonary effort is normal.   Musculoskeletal: Normal range of motion. Comments: Mild edema and ttp right posterior elbow  Painful active extension of right elbow  Compartments of right upper extremity are soft  CMS intact right hand  No overlying redness or increased heat to palpate right elbow joint   Skin:     General: Skin is warm and dry. Neurological:      Mental Status: He is alert and oriented to person, place, and time. DIAGNOSTIC RESULTS     EKG: All EKG's are interpreted by the Emergency Department Physician who either signs or Co-signs this chart in the absence of acardiologist.        RADIOLOGY:   Non-plain film images such as CT, Ultrasound andMRI are read by the radiologist. Plain radiographic images are visualized and preliminarily interpreted by the emergency physician with the below findings:        Interpretation per the Radiologist below, if available at the time of this note:    XR ELBOW RIGHT (MIN 3 VIEWS)   Final Result   Impression:   1. No acute osseous finding. Elbow joint effusion is present, occult   nonvisualized fracture not excluded. 2.  Dorsal soft tissue swelling. No radiopaque foreign body. Signed by Dr Rc Cho on 4/27/2021 8:49 PM            ED BEDSIDE ULTRASOUND:   Performed by ED Physician - none    LABS:  Labs Reviewed - No data to display    All other labs were within normal range or not returned as of this dictation. RE-ASSESSMENT     Discussed with patient possible septic arthritis of right elbow causing symptoms. He seems to understand indication for further evaluation of this with labs however declined. Discussed abnormal x-ray result and need to follow up with orthopedic provider.        EMERGENCY DEPARTMENT COURSE and DIFFERENTIALDIAGNOSIS/MDM:   Vitals:    Vitals:    04/27/21 2021   BP: (!) 160/88   Pulse: 80   Resp: 20   Temp: 98 °F (36.7 °C)   SpO2: 98%   Weight: (!) 330 lb (149.7 kg)   Height: 6' (1.829 m)       MDM      CONSULTS:  None    PROCEDURES:  Unless otherwise notedbelow, none     Procedures    FINAL IMPRESSION     1. Right elbow pain    2. Joint effusion of elbow, right    3. History of gout          DISPOSITION/PLAN   DISPOSITION Decision To Discharge 04/27/2021 11:10:44 PM      PATIENT REFERRED TO:  Valeri Mtz MD  [address] [place] [address]  [phone]    Schedule an appointment as soon as possible for a visit in 2 days        DISCHARGE MEDICATIONS:    Attestation: The Prescription Monitoring Report for this patient was reviewed today. ([id number]) WILMAN Jimenez)  Periodic Controlled Substance Monitoring: Possible medication side effects, risk of tolerance/dependence & alternative treatments discussed., No signs of potential drug abuse or diversion identified. WILMAN Jimenez)  Discharge Medication List as of 4/27/2021 10:00 PM           Medication List      START taking these medications    methylPREDNISolone 4 MG tablet  Commonly known as: MEDROL (SHABBIR)  Take by mouth as directed. oxyCODONE-acetaminophen 5-325 MG per tablet  Commonly known as: Percocet  Take 1 tablet by mouth every 6 hours as needed for Pain for up to 3 days. Intended supply: 3 days. Take lowest dose possible to manage pain        STOP taking these medications    DULoxetine 60 MG extended release capsule  Commonly known as: CYMBALTA     hydrOXYzine 25 MG capsule  Commonly known as: VISTARIL     traZODone 100 MG tablet  Commonly known as: DESYREL        ASK your doctor about these medications    amitriptyline 50 MG tablet  Commonly known as: ELAVIL     amLODIPine 5 MG tablet  Commonly known as: NORVASC  Take 2 tablets by mouth daily     atenolol 50 MG tablet  Commonly known as: TENORMIN  Ask about: Which instructions should I use?     blood glucose test strips strip  Commonly known as: [employer] Test  1 each by In Vitro route daily.  Test sugars daily and prn DX:250.00     cloNIDine 0.3 MG tablet  Commonly known as: CATAPRES  TAKE THREE TABLETS BY MOUTH TWICE A DAY colchicine 0.6 MG tablet  Commonly known as: COLCRYS     diclofenac 75 MG EC tablet  Commonly known as: VOLTAREN     glucose monitoring kit monitoring kit  1 kit by Does not apply route daily as needed. DX:250.00     hydroCHLOROthiazide 25 MG tablet  Commonly known as: HYDRODIURIL  Take 1 tablet by mouth daily     Lancets Misc  DX: 250.00  Test sugar daily and prn     metFORMIN 1000 MG tablet  Commonly known as: GLUCOPHAGE     prazosin 2 MG capsule  Commonly known as: MINIPRESS     simvastatin 40 MG tablet  Commonly known as: Zocor  Take 1 tablet by mouth every evening.            Where to Get Your Medications      These medications were sent to [address] [place] [address] RD., [address] [facility] [id number]    Hours: 24-hours Phone: [phone]   · methylPREDNISolone 4 MG tablet  · oxyCODONE-acetaminophen 5-325 MG per tablet         (Pleasenote that portions of this note were completed with a voice recognition program.  Efforts were made to edit the dictations but occasionally words are mis-transcribed.)              Therese Melgar, WILMAN  04/27/21 2610

## 2021-09-20 ENCOUNTER — PREP FOR SURGERY (OUTPATIENT)
Dept: OTHER | Facility: HOSPITAL | Age: 56
End: 2021-09-20

## 2021-09-20 ENCOUNTER — PREP FOR SURGERY (OUTPATIENT)
Dept: GASTROENTEROLOGY | Facility: CLINIC | Age: 56
End: 2021-09-20

## 2021-09-20 DIAGNOSIS — Z12.11 ENCOUNTER FOR SCREENING FOR MALIGNANT NEOPLASM OF COLON: Primary | ICD-10-CM

## 2021-09-21 PROBLEM — Z12.11 ENCOUNTER FOR SCREENING FOR MALIGNANT NEOPLASM OF COLON: Status: ACTIVE | Noted: 2021-09-21

## 2021-10-19 ENCOUNTER — ANESTHESIA EVENT (OUTPATIENT)
Dept: GASTROENTEROLOGY | Facility: HOSPITAL | Age: 56
End: 2021-10-19

## 2021-10-19 ENCOUNTER — HOSPITAL ENCOUNTER (OUTPATIENT)
Facility: HOSPITAL | Age: 56
Setting detail: HOSPITAL OUTPATIENT SURGERY
Discharge: HOME | End: 2021-10-19
Attending: INTERNAL MEDICINE | Admitting: INTERNAL MEDICINE

## 2021-10-19 ENCOUNTER — ANESTHESIA (OUTPATIENT)
Dept: GASTROENTEROLOGY | Facility: HOSPITAL | Age: 56
End: 2021-10-19

## 2021-10-19 VITALS
DIASTOLIC BLOOD PRESSURE: 80 MMHG | TEMPERATURE: 96.9 F | HEART RATE: 73 BPM | SYSTOLIC BLOOD PRESSURE: 130 MMHG | RESPIRATION RATE: 18 BRPM | WEIGHT: 313 LBS | HEIGHT: 72 IN | BODY MASS INDEX: 42.39 KG/M2 | OXYGEN SATURATION: 98 %

## 2021-10-19 DIAGNOSIS — Z12.11 ENCOUNTER FOR SCREENING FOR MALIGNANT NEOPLASM OF COLON: ICD-10-CM

## 2021-10-19 PROCEDURE — 25010000002 PROPOFOL 10 MG/ML EMULSION: Performed by: NURSE ANESTHETIST, CERTIFIED REGISTERED

## 2021-10-19 PROCEDURE — 45378 DIAGNOSTIC COLONOSCOPY: CPT | Performed by: INTERNAL MEDICINE

## 2021-10-19 RX ORDER — SODIUM CHLORIDE 0.9 % (FLUSH) 0.9 %
10 SYRINGE (ML) INJECTION EVERY 12 HOURS SCHEDULED
Status: CANCELLED | OUTPATIENT
Start: 2021-10-19

## 2021-10-19 RX ORDER — SODIUM CHLORIDE 0.9 % (FLUSH) 0.9 %
10 SYRINGE (ML) INJECTION AS NEEDED
Status: DISCONTINUED | OUTPATIENT
Start: 2021-10-19 | End: 2021-10-19 | Stop reason: HOSPADM

## 2021-10-19 RX ORDER — PROPOFOL 10 MG/ML
VIAL (ML) INTRAVENOUS AS NEEDED
Status: DISCONTINUED | OUTPATIENT
Start: 2021-10-19 | End: 2021-10-19 | Stop reason: SURG

## 2021-10-19 RX ORDER — LIDOCAINE HYDROCHLORIDE 10 MG/ML
0.5 INJECTION, SOLUTION EPIDURAL; INFILTRATION; INTRACAUDAL; PERINEURAL ONCE AS NEEDED
Status: DISCONTINUED | OUTPATIENT
Start: 2021-10-19 | End: 2021-10-19 | Stop reason: HOSPADM

## 2021-10-19 RX ORDER — SODIUM CHLORIDE 0.9 % (FLUSH) 0.9 %
10 SYRINGE (ML) INJECTION AS NEEDED
Status: CANCELLED | OUTPATIENT
Start: 2021-10-19

## 2021-10-19 RX ORDER — LIDOCAINE HYDROCHLORIDE 20 MG/ML
INJECTION, SOLUTION EPIDURAL; INFILTRATION; INTRACAUDAL; PERINEURAL AS NEEDED
Status: DISCONTINUED | OUTPATIENT
Start: 2021-10-19 | End: 2021-10-19 | Stop reason: SURG

## 2021-10-19 RX ORDER — MIDAZOLAM HYDROCHLORIDE 1 MG/ML
1 INJECTION, SOLUTION INTRAMUSCULAR; INTRAVENOUS
Status: CANCELLED | OUTPATIENT
Start: 2021-10-19

## 2021-10-19 RX ORDER — SODIUM CHLORIDE 9 MG/ML
500 INJECTION, SOLUTION INTRAVENOUS CONTINUOUS PRN
Status: DISCONTINUED | OUTPATIENT
Start: 2021-10-19 | End: 2021-10-19 | Stop reason: HOSPADM

## 2021-10-19 RX ORDER — SODIUM CHLORIDE 9 MG/ML
100 INJECTION, SOLUTION INTRAVENOUS CONTINUOUS
Status: CANCELLED | OUTPATIENT
Start: 2021-10-19

## 2021-10-19 RX ADMIN — PROPOFOL 420 MG: 10 INJECTION, EMULSION INTRAVENOUS at 11:15

## 2021-10-19 RX ADMIN — LIDOCAINE HYDROCHLORIDE 100 MG: 20 INJECTION, SOLUTION EPIDURAL; INFILTRATION; INTRACAUDAL; PERINEURAL at 11:15

## 2021-10-19 RX ADMIN — SODIUM CHLORIDE 500 ML: 9 INJECTION, SOLUTION INTRAVENOUS at 09:34

## 2021-10-19 NOTE — ANESTHESIA POSTPROCEDURE EVALUATION
Patient: Ricardo Menchaca    Procedure Summary     Date: 10/19/21 Room / Location: [facility] ENDOSCOPY 2 /  [facility] END[facility]    Anesthesia Start: 1114 Anesthesia Stop: 1135    Procedure: COLONOSCOPY WITH ANESTHESIA (N/A ) Diagnosis:       Encounter for screening for malignant neoplasm of colon      (Encounter for screening for malignant neoplasm of colon [Z12.11])    Surgeons: Franklin Sharma MD Provider: Manasa Cortez CRNA    Anesthesia Type: MAC ASA Status: 2          Anesthesia Type: MAC    Vitals  Vitals Value Taken Time   /80 10/19/21 1150   Temp     Pulse 76 10/19/21 1153   Resp 17 10/19/21 1145   SpO2 97 % 10/19/21 1153   Vitals shown include unvalidated device data.        Post Anesthesia Care and Evaluation    Patient location during evaluation: PHASE II  Patient participation: complete - patient participated  Level of consciousness: awake and alert  Pain management: adequate  Airway patency: patent  Anesthetic complications: No anesthetic complications  PONV Status: none  Cardiovascular status: acceptable  Respiratory status: acceptable  Hydration status: acceptable  No anesthesia care post op

## 2021-10-19 NOTE — ANESTHESIA PREPROCEDURE EVALUATION
Anesthesia Evaluation     Patient summary reviewed                Airway   Mallampati: II  Dental      Pulmonary    (+) a smoker Former,   Cardiovascular   Exercise tolerance: excellent (>7 METS)    (+) hypertension,   (-) pacemaker, past MI, cardiac stents, CABG      Neuro/Psych  (-) seizures, CVA  GI/Hepatic/Renal/Endo    (+) morbid obesity,  diabetes mellitus type 2,     Musculoskeletal     Abdominal   (+) obese,    Substance History      OB/GYN          Other                        Anesthesia Plan    ASA 2     MAC     intravenous induction     Anesthetic plan, all risks, benefits, and alternatives have been provided, discussed and informed consent has been obtained with: patient and spouse/significant other.

## 2021-10-19 NOTE — DISCHARGE INSTRUCTIONS
Dr. Sharma office will contact you to re-schedule your colonoscopy, due to bowel preparation being suboptimal.

## 2021-10-19 NOTE — H&P
[facility] Gastroenterology  Pre Procedure History & Physical    Chief Complaint:   Screening    Subjective     HPI:   Here for screening colonoscopy    Past Medical History:   Past Medical History:   Diagnosis Date   • Depression    • Diabetes mellitus (HCC)    • Hypertension        Past Surgical History:  Past Surgical History:   Procedure Laterality Date   • CARDIAC CATHETERIZATION  06/27/2015    Left Heart   • COLONOSCOPY W/ BIOPSIES  08/10/2005    Mild active colitis with significant increase in eosinophils, small hemorrhoids    • ENDOSCOPY  08/08/2005    Large HH, negative for celiac disease   • SPINAL FUSION         Family History:  Family History   Problem Relation Age of Onset   • Heart disease Mother    • Diabetes Mother    • Hypertension Mother    • Arthritis Father    • Cancer Father    • Diabetes Father    • Hypertension Father        Social History:   reports that he has quit smoking. He has never used smokeless tobacco. He reports that he does not drink alcohol and does not use drugs.    Medications:   Prior to Admission medications    Medication Sig Start Date End Date Taking? Authorizing Provider   amLODIPine (NORVASC) 5 MG tablet Take 10 mg by mouth Daily. 7/22/19  Yes Kelsi Garcia MD   atenolol (TENORMIN) 100 MG tablet Take 100 mg by mouth Daily. 7/22/19  Yes Kelsi Garcia MD   cloNIDine (CATAPRES) 0.3 MG tablet TAKE THREE TABLETS BY MOUTH TWICE A DAY 6/27/17  Yes Kelsi Garcia MD   labetalol (NORMODYNE) 100 MG tablet Take 100 mg by mouth 2 (Two) Times a Day. 6/16/20  Yes Kelsi Garcia MD   olmesartan (BENICAR) 40 MG tablet Take 40 mg by mouth Daily. FOR 30 DAYS 6/16/20  Yes Kelsi Garcia MD   amitriptyline (ELAVIL) 50 MG tablet Take 50 mg by mouth 3 (Three) Times a Day.    Kelsi Garcia MD   colchicine 0.6 MG tablet Take 0.6 mg by mouth Daily.    Kelsi Garcia MD   diclofenac (VOLTAREN) 75 MG EC tablet Take 75 mg by mouth 2 (Two) Times a  "Day.    Kelsi Garcia MD   DULoxetine (CYMBALTA) 60 MG capsule Take 60 mg by mouth 2 (Two) Times a Day.    Kelsi Garcia MD   furosemide (LASIX) 20 MG tablet Take 20 mg by mouth Daily. FOR 30 DAYS 6/4/20   Kelsi Garcia MD   hydrOXYzine pamoate (VISTARIL) 25 MG capsule Take 25 mg by mouth At Night As Needed.    Kelsi Garcia MD   metFORMIN (GLUCOPHAGE) 1000 MG tablet Take 1,000 mg by mouth 2 (Two) Times a Day With Meals.    Kelsi Garcia MD   prazosin (MINIPRESS) 2 MG capsule Take 2 mg by mouth Every Night.    Kelsi Garcia MD   prazosin (MINIPRESS) 5 MG capsule Take 5 mg by mouth Every Night.    Kelsi Garcia MD   simvastatin (ZOCOR) 40 MG tablet Take 40 mg by mouth Every Night. 3/24/15   Kelsi Garcia MD   traZODone (DESYREL) 100 MG tablet Take 100 mg by mouth Every Night.    Kelsi Garcia MD   zolpidem (AMBIEN) 5 MG tablet Take 5 mg by mouth At Night As Needed for Sleep.    Kelsi Garcia MD   polyethylene glycol (GoLYTELY) 236 g solution As Directed by office. May add flavor packet 9/20/21 10/19/21  Melissa Jasmine APRN       Allergies:  Lortab [hydrocodone-acetaminophen], Penicillins, and Shrimp    Objective     Blood pressure 139/85, pulse 75, temperature 96.9 °F (36.1 °C), temperature source Temporal, resp. rate 20, height 182.9 cm (72\"), weight (!) 142 kg (313 lb), SpO2 97 %.    Physical Exam   Constitutional: Pt is oriented to person, place, and in no distress.   HENT: Mouth/Throat: Oropharynx is clear.   Cardiovascular: Normal rate, regular rhythm.    Pulmonary/Chest: Effort normal. No respiratory distress. No  wheezes.   Abdominal: Soft. Non-distended.  Skin: Skin is warm and dry.   Psychiatric: Mood, memory, affect and judgment appear normal.     Assessment/Plan     Diagnosis:  Screening colonoscopy    Anticipated Surgical Procedure:    Proceed with colonoscopy as scheduled    The following major R/B/A were discussed " with the patient, however the list is not all inclusive . Risk:  Bleeding (immediate and delayed), perforation (rupture or tear), reaction to medication, missed lesion/cancer, pain during the procedure, infection, need for surgery, need for ostomy, need for mechanical ventilation (breathing machine), death.  Benefits: removal of polyp/tissue, burn/clip/or inject to stop bleeding, removal of foreign body, dilate any stricture.  Alternatives: Xray or CT, surgery, do nothing with associated risk   The patient was given time to ask question and received explanation, and agrees to proceed as per History and Physical.   No guarantee given or expressed.    EMR Dragon/transcription disclaimer: Much of this encounter note is an electronic transcription/translation of spoken language to printed text.  The electronic translation of spoken language may permit erroneous, or at times, nonsensical words or phrases to be inadvertently transcribed.  Although I have reviewed the note for such errors, some may still exist.    Franklin Sharma MD  11:15 CDT  10/19/2021

## 2021-10-26 ENCOUNTER — TELEPHONE (OUTPATIENT)
Dept: GASTROENTEROLOGY | Facility: CLINIC | Age: 56
End: 2021-10-26

## 2022-01-14 ENCOUNTER — OFFICE VISIT (OUTPATIENT)
Dept: INTERNAL MEDICINE | Facility: CLINIC | Age: 57
End: 2022-01-14

## 2022-01-14 VITALS
OXYGEN SATURATION: 98 % | WEIGHT: 299.3 LBS | HEIGHT: 72 IN | BODY MASS INDEX: 40.54 KG/M2 | SYSTOLIC BLOOD PRESSURE: 120 MMHG | TEMPERATURE: 98 F | HEART RATE: 73 BPM | RESPIRATION RATE: 16 BRPM | DIASTOLIC BLOOD PRESSURE: 100 MMHG

## 2022-01-14 DIAGNOSIS — I10 ESSENTIAL HYPERTENSION: Primary | ICD-10-CM

## 2022-01-14 DIAGNOSIS — Z00.01 ANNUAL VISIT FOR GENERAL ADULT MEDICAL EXAMINATION WITH ABNORMAL FINDINGS: ICD-10-CM

## 2022-01-14 DIAGNOSIS — E78.2 MIXED HYPERLIPIDEMIA: ICD-10-CM

## 2022-01-14 DIAGNOSIS — E66.01 CLASS 3 SEVERE OBESITY DUE TO EXCESS CALORIES WITH SERIOUS COMORBIDITY AND BODY MASS INDEX (BMI) OF 40.0 TO 44.9 IN ADULT: ICD-10-CM

## 2022-01-14 DIAGNOSIS — M1A.09X0 IDIOPATHIC CHRONIC GOUT OF MULTIPLE SITES WITHOUT TOPHUS: ICD-10-CM

## 2022-01-14 DIAGNOSIS — E11.9 CONTROLLED TYPE 2 DIABETES MELLITUS WITHOUT COMPLICATION, WITHOUT LONG-TERM CURRENT USE OF INSULIN: ICD-10-CM

## 2022-01-14 PROBLEM — M25.512 ACUTE PAIN OF LEFT SHOULDER: Status: RESOLVED | Noted: 2020-06-30 | Resolved: 2022-01-14

## 2022-01-14 PROBLEM — Z12.11 ENCOUNTER FOR SCREENING FOR MALIGNANT NEOPLASM OF COLON: Status: RESOLVED | Noted: 2021-09-21 | Resolved: 2022-01-14

## 2022-01-14 PROBLEM — E66.813 CLASS 3 SEVERE OBESITY DUE TO EXCESS CALORIES WITH SERIOUS COMORBIDITY AND BODY MASS INDEX (BMI) OF 40.0 TO 44.9 IN ADULT: Status: ACTIVE | Noted: 2020-04-16

## 2022-01-14 PROBLEM — M54.2 CERVICALGIA: Status: RESOLVED | Noted: 2020-04-16 | Resolved: 2022-01-14

## 2022-01-14 PROCEDURE — 99204 OFFICE O/P NEW MOD 45 MIN: CPT | Performed by: INTERNAL MEDICINE

## 2022-01-14 RX ORDER — LABETALOL 100 MG/1
100 TABLET, FILM COATED ORAL 2 TIMES DAILY
Qty: 30 TABLET | Refills: 3 | Status: CANCELLED | OUTPATIENT
Start: 2022-01-14

## 2022-01-14 RX ORDER — COLCHICINE 0.6 MG/1
0.6 TABLET ORAL DAILY
Qty: 30 TABLET | Refills: 0 | Status: SHIPPED | OUTPATIENT
Start: 2022-01-14 | End: 2022-04-27

## 2022-01-14 RX ORDER — ATORVASTATIN CALCIUM 20 MG/1
20 TABLET, FILM COATED ORAL DAILY
Qty: 90 TABLET | Refills: 1 | Status: SHIPPED | OUTPATIENT
Start: 2022-01-14

## 2022-01-14 RX ORDER — CLONIDINE HYDROCHLORIDE 0.3 MG/1
0.9 TABLET ORAL 2 TIMES DAILY
Qty: 90 TABLET | Refills: 3 | Status: CANCELLED | OUTPATIENT
Start: 2022-01-14

## 2022-01-14 RX ORDER — OLMESARTAN MEDOXOMIL 40 MG/1
40 TABLET ORAL DAILY
Qty: 30 TABLET | Refills: 3 | Status: CANCELLED | OUTPATIENT
Start: 2022-01-14

## 2022-01-14 RX ORDER — AMLODIPINE AND OLMESARTAN MEDOXOMIL 5; 20 MG/1; MG/1
1 TABLET ORAL DAILY
Qty: 90 TABLET | Refills: 1 | Status: SHIPPED | OUTPATIENT
Start: 2022-01-14 | End: 2022-05-13

## 2022-01-14 RX ORDER — FUROSEMIDE 20 MG/1
20 TABLET ORAL DAILY
Qty: 30 TABLET | Refills: 3 | Status: CANCELLED | OUTPATIENT
Start: 2022-01-14

## 2022-01-14 NOTE — PROGRESS NOTES
CC: establish care for hypertension    History:  Ricardo Menchaca Jr. is a 56 y.o. male who presents today for evaluation of the above problems.  He admits he has been doing reasonably well, but has elevation of his BP as he has been off multiple medications that he was previously on. However, he has been making efforts at weight loss and has successfully lost down from a max of 335. Sugars had previously been controlled with metformin, though he has not had symptoms of hyperglycemia nor hypoglycemia off this.       ROS:  Review of Systems   Constitutional: Negative for chills and fever.   Respiratory: Negative for cough and shortness of breath.    Cardiovascular: Negative for chest pain and palpitations.   Gastrointestinal: Negative for abdominal pain and constipation.   Genitourinary: Negative for difficulty urinating and dysuria.       Allergies   Allergen Reactions   • Lortab [Hydrocodone-Acetaminophen] Angioedema   • Penicillins Anaphylaxis   • Shrimp Angioedema     Past Medical History:   Diagnosis Date   • Depression    • Diabetes mellitus (HCC)    • Hypertension      Past Surgical History:   Procedure Laterality Date   • CARDIAC CATHETERIZATION  06/27/2015    Left Heart   • COLONOSCOPY N/A 10/19/2021    Procedure: COLONOSCOPY WITH ANESTHESIA;  Surgeon: Franklin Sharma MD;  Location: [facility] ENDOSCOPY;  Service: Gastroenterology;  Laterality: N/A;  pre op: screening  post op: diverticulosis, poor prep  PCP: none   • COLONOSCOPY W/ BIOPSIES  08/10/2005    Mild active colitis with significant increase in eosinophils, small hemorrhoids    • ENDOSCOPY  08/08/2005    Large HH, negative for celiac disease   • SPINAL FUSION       Family History   Problem Relation Age of Onset   • Heart disease Mother    • Diabetes Mother    • Hypertension Mother    • Arthritis Father    • Cancer Father    • Diabetes Father    • Hypertension Father       reports that he has quit smoking. He has never used smokeless tobacco. He reports  "that he does not drink alcohol and does not use drugs.      Current Outpatient Medications:   None currently, but with multiple previous prescriptions.  OBJECTIVE:  /100 (BP Location: Left arm, Patient Position: Sitting, Cuff Size: Adult)   Pulse 73   Temp 98 °F (36.7 °C)   Resp 16   Ht 182.9 cm (72\")   Wt 136 kg (299 lb 4.8 oz)   SpO2 98%   BMI 40.59 kg/m²    Physical Exam  Constitutional:       General: He is not in acute distress.  Cardiovascular:      Rate and Rhythm: Normal rate and regular rhythm.      Heart sounds: Normal heart sounds. No murmur heard.      Pulmonary:      Effort: Pulmonary effort is normal.      Breath sounds: Normal breath sounds. No wheezing.   Neurological:      Mental Status: He is alert and oriented to person, place, and time.      Gait: Gait normal.   Psychiatric:         Mood and Affect: Mood normal.         Behavior: Behavior normal.         Assessment/Plan     Diagnoses and all orders for this visit:    1. Essential hypertension (Primary)  -     Comprehensive Metabolic Panel; Future  -     Lipid Panel; Future  -     CBC (No Diff); Future  -     amlodipine-olmesartan (PACO) 5-20 MG per tablet; Take 1 tablet by mouth Daily.  Dispense: 90 tablet; Refill: 1  Poorly controlled, BP goal for age is <140/90 per JNC 8 guidelines and restart meds with Paco and check labs as ordered.     2. Controlled type 2 diabetes mellitus without complication, without long-term current use of insulin (HCC)  -     Comprehensive Metabolic Panel; Future  -     Hemoglobin A1c; Future  -     Lipid Panel; Future  -     TSH; Future  -     atorvastatin (LIPITOR) 20 MG tablet; Take 1 tablet by mouth Daily.  Dispense: 90 tablet; Refill: 1  -     Microalbumin / Creatinine Urine Ratio - Urine, Clean Catch; Future  Labs to evaluate. Restart meds as indicated. Consider metformin and/or GLP-1 agonist.     3. Idiopathic chronic gout of multiple sites without tophus  -     Uric acid; Future  -     colchicine " 0.6 MG tablet; Take 1 tablet by mouth Daily.  Dispense: 30 tablet; Refill: 0  Colchicine for prn use. Uric acid level with goal of <6.     4. Mixed hyperlipidemia  -     atorvastatin (LIPITOR) 20 MG tablet; Take 1 tablet by mouth Daily.  Dispense: 90 tablet; Refill: 1  Given DM and ASCVD risk, restart statin therapy at moderate intensity.     5. Class 3 severe obesity due to excess calories with serious comorbidity and body mass index (BMI) of 40.0 to 44.9 in adult (HCC)  Patient's Body mass index is 40.59 kg/m². indicating that he is morbidly obese (BMI > 40 or > 35 with obesity - related health condition). Obesity-related health conditions include the following: hypertension, diabetes mellitus and dyslipidemias. Obesity is improving with lifestyle modifications. BMI is is above average; BMI management plan is completed. We discussed portion control and increasing exercise..    6. Annual visit for general adult medical examination with abnormal findings  -     Hepatitis C Antibody; Future  -     TSH; Future         An After Visit Summary was printed and given to the patient at discharge.  Return in about 4 months (around 5/14/2022) for Medicare Wellness--Initial.         Arturo Barahona D.O. 1/15/2022   Electronically signed.

## 2022-04-17 ENCOUNTER — APPOINTMENT (OUTPATIENT)
Dept: GENERAL RADIOLOGY | Facility: HOSPITAL | Age: 57
End: 2022-04-17

## 2022-04-17 ENCOUNTER — HOSPITAL ENCOUNTER (EMERGENCY)
Facility: HOSPITAL | Age: 57
Discharge: HOME | End: 2022-04-17
Admitting: EMERGENCY MEDICINE

## 2022-04-17 VITALS
BODY MASS INDEX: 42.28 KG/M2 | RESPIRATION RATE: 20 BRPM | TEMPERATURE: 98.6 F | SYSTOLIC BLOOD PRESSURE: 158 MMHG | HEART RATE: 84 BPM | OXYGEN SATURATION: 98 % | HEIGHT: 71 IN | WEIGHT: 302 LBS | DIASTOLIC BLOOD PRESSURE: 96 MMHG

## 2022-04-17 DIAGNOSIS — M25.50 ARTHRALGIA, UNSPECIFIED JOINT: Primary | ICD-10-CM

## 2022-04-17 DIAGNOSIS — M1A.09X0 IDIOPATHIC CHRONIC GOUT OF MULTIPLE SITES WITHOUT TOPHUS: ICD-10-CM

## 2022-04-17 LAB — URATE SERPL-MCNC: 6.2 MG/DL (ref 3.4–7)

## 2022-04-17 PROCEDURE — 73110 X-RAY EXAM OF WRIST: CPT

## 2022-04-17 PROCEDURE — 99283 EMERGENCY DEPT VISIT LOW MDM: CPT

## 2022-04-17 PROCEDURE — 36415 COLL VENOUS BLD VENIPUNCTURE: CPT

## 2022-04-17 PROCEDURE — 96372 THER/PROPH/DIAG INJ SC/IM: CPT

## 2022-04-17 PROCEDURE — 73564 X-RAY EXAM KNEE 4 OR MORE: CPT

## 2022-04-17 PROCEDURE — 25010000002 MORPHINE PER 10 MG: Performed by: EMERGENCY MEDICINE

## 2022-04-17 PROCEDURE — 25010000002 DEXAMETHASONE PER 1 MG: Performed by: EMERGENCY MEDICINE

## 2022-04-17 PROCEDURE — 84550 ASSAY OF BLOOD/URIC ACID: CPT | Performed by: PHYSICIAN ASSISTANT

## 2022-04-17 PROCEDURE — 63710000001 ONDANSETRON ODT 4 MG TABLET DISPERSIBLE: Performed by: EMERGENCY MEDICINE

## 2022-04-17 RX ORDER — DEXAMETHASONE SODIUM PHOSPHATE 10 MG/ML
6 INJECTION INTRAMUSCULAR; INTRAVENOUS ONCE
Status: COMPLETED | OUTPATIENT
Start: 2022-04-17 | End: 2022-04-17

## 2022-04-17 RX ORDER — INDOMETHACIN 25 MG/1
25 CAPSULE ORAL 3 TIMES DAILY PRN
Qty: 24 CAPSULE | Refills: 0 | Status: SHIPPED | OUTPATIENT
Start: 2022-04-17 | End: 2022-04-27

## 2022-04-17 RX ORDER — ONDANSETRON 4 MG/1
4 TABLET, ORALLY DISINTEGRATING ORAL ONCE
Status: COMPLETED | OUTPATIENT
Start: 2022-04-17 | End: 2022-04-17

## 2022-04-17 RX ADMIN — DEXAMETHASONE SODIUM PHOSPHATE 6 MG: 10 INJECTION INTRAMUSCULAR; INTRAVENOUS at 23:04

## 2022-04-17 RX ADMIN — ONDANSETRON 4 MG: 4 TABLET, ORALLY DISINTEGRATING ORAL at 23:04

## 2022-04-17 RX ADMIN — MORPHINE SULFATE 4 MG: 4 INJECTION, SOLUTION INTRAMUSCULAR; INTRAVENOUS at 23:04

## 2022-04-18 NOTE — ED PROVIDER NOTES
Subjective   History of Present Illness    Patient is a 56-year-old male presenting to ED with wrist and knee pain.  PMH significant for non-insulin-dependent diabetes, hypertension.  Patient states over the past few days he has felt well at his baseline doing normal workouts and a normal diet.  Patient states he was awoke from sleep at 3 AM this morning with significant sudden pain in his bilateral wrist as well as bilateral knees.  Patient states that the pain in his wrist is worse.  Patient denies any 1 hand or 1 joint which is most significant.  Patient describes mild swelling throughout both wrists and knees.  Patient states he has been applying topical diclofenac ointment, took an oral diclofenac, and has had no relief in his symptoms.  Patient did state he has a history of gout and reports his symptoms have always been isolated to one joint and significantly milder.  Patient denies any overlying skin changes including wounds, erythema.  Patient states he could no longer tolerate the pain at which time he presents for further evaluation.  Patient denies any history of similar episodes.    Records reviewed show patient last seen in the ED on 4/7/2021 for right elbow pain, joint effusion of the right elbow, history of gout.    Patient last seen the outpatient setting on 1/14/2022 for essential hypertension, controlled type 2 diabetes, idiopathic chronic gout of multiple sites, management of a annual well visit.    Review of Systems   Constitutional: Negative.    HENT: Negative.    Eyes: Negative.    Respiratory: Negative.    Cardiovascular: Negative.    Gastrointestinal: Negative.    Genitourinary: Negative.    Musculoskeletal: Positive for arthralgias (Bilateral wrist, bilateral knees) and joint swelling (Bilateral wrist, bilateral knees). Negative for back pain, gait problem and neck pain.   Skin: Negative.  Negative for wound.   Neurological: Negative.  Negative for weakness and numbness.    Psychiatric/Behavioral: Negative.    All other systems reviewed and are negative.      Past Medical History:   Diagnosis Date   • Depression    • Diabetes mellitus (HCC)    • Hypertension    • Obese        Allergies   Allergen Reactions   • Lortab [Hydrocodone-Acetaminophen] Angioedema   • Penicillins Anaphylaxis   • Shrimp Angioedema       Past Surgical History:   Procedure Laterality Date   • CARDIAC CATHETERIZATION  06/27/2015    Left Heart   • COLONOSCOPY N/A 10/19/2021    Procedure: COLONOSCOPY WITH ANESTHESIA;  Surgeon: Franklin Sharma MD;  Location: [facility] ENDOSCOPY;  Service: Gastroenterology;  Laterality: N/A;  pre op: screening  post op: diverticulosis, poor prep  PCP: none   • COLONOSCOPY W/ BIOPSIES  08/10/2005    Mild active colitis with significant increase in eosinophils, small hemorrhoids    • ENDOSCOPY  08/08/2005    Large HH, negative for celiac disease   • SPINAL FUSION         Family History   Problem Relation Age of Onset   • Heart disease Mother    • Diabetes Mother    • Hypertension Mother    • Arthritis Father    • Cancer Father    • Diabetes Father    • Hypertension Father        Social History     Socioeconomic History   • Marital status:    Tobacco Use   • Smoking status: Former Smoker   • Smokeless tobacco: Never Used   Vaping Use   • Vaping Use: Never used   Substance and Sexual Activity   • Alcohol use: No   • Drug use: No   • Sexual activity: Defer           Objective   Physical Exam  Vitals and nursing note reviewed.   Constitutional:       General: He is in acute distress (Appears uncomfortable due to pain).      Appearance: Normal appearance. He is well-developed, well-groomed and overweight. He is not toxic-appearing or diaphoretic.   HENT:      Head: Normocephalic.      Mouth/Throat:      Mouth: Mucous membranes are moist.      Pharynx: Oropharynx is clear.   Eyes:      Conjunctiva/sclera: Conjunctivae normal.      Pupils: Pupils are equal, round, and reactive to light.    Cardiovascular:      Rate and Rhythm: Normal rate.      Pulses: Normal pulses.   Pulmonary:      Effort: Pulmonary effort is normal.      Breath sounds: Normal breath sounds.   Abdominal:      Palpations: Abdomen is soft.   Musculoskeletal:         General: Normal range of motion.      Cervical back: Normal range of motion and neck supple.      Comments: Diffuse tenderness to palpitation of bilateral hands, wrists, and anterior knees.  No focal/localized point tenderness.  No erythema overlying the joints, no wounds.  Full but painful range of motion of all the joints.  All 4 extremities are neurovascular intact distally.   Skin:     General: Skin is warm.      Findings: No abrasion, bruising, erythema, laceration or wound.   Neurological:      Mental Status: He is alert and oriented to person, place, and time.      Sensory: No sensory deficit.      Motor: No weakness.      Gait: Gait normal.   Psychiatric:         Attention and Perception: Attention normal.         Mood and Affect: Mood and affect normal.         Speech: Speech normal.         Behavior: Behavior normal. Behavior is cooperative.         Procedures           ED Course                                                 MDM  Number of Diagnoses or Management Options     Amount and/or Complexity of Data Reviewed  Clinical lab tests: reviewed and ordered  Tests in the medicine section of CPT®: ordered and reviewed  Decide to obtain previous medical records or to obtain history from someone other than the patient: yes  Review and summarize past medical records: yes  Discuss the patient with other providers: yes (Dr. Enrique Bailey (attending))    Patient Progress  Patient progress: improved      Patient is a 56-year-old male presenting to ED with wrist and knee pain.  PMH significant for non-insulin-dependent diabetes, hypertension. Knee x-ray showed: Degenerative changes in bilateral knees. Wrist x-ray showed: No acute osseous abnormalities.  Uric acid  6.2.  Patient given a dose of steroids, pain medication and reported feeling better.  Discussed need for symptomatic treatment utilizing heat versus ice, rest, compressive therapy as needed, anti-inflammatories, as well as close PCP follow-up.  Discussed return precautions any for immediate return to ED should he develop any new or worsening symptoms.  Patient very appreciative, ambulating without difficulty, and have improved range of motion of joints without pain and is stable for discharge.      Final diagnoses:   Arthralgia, unspecified joint   Idiopathic chronic gout of multiple sites without tophus       ED Disposition  ED Disposition     ED Disposition   Discharge    Condition   Stable    Comment   --             Arturo Barahona, DO  [address]   [facility] [address] [phone]    Schedule an appointment as soon as possible for a visit in 2 day(s)      [facility] Emergency Department  [address] [place] [id number] [phone]  Follow up  As needed         Medication List      New Prescriptions    indomethacin 25 MG capsule  Commonly known as: INDOCIN  Take 1 capsule by mouth 3 (Three) Times a Day As Needed for Mild Pain .           Where to Get Your Medications      These medications were sent to [facility] - [place], [place] - [address] - [phone]  - [phone]   [address] [facility] [address]    Phone: [phone]   · indomethacin 25 MG capsule          Juan Pablo Chauhan PA-C  04/18/22 1947

## 2022-04-27 ENCOUNTER — HOSPITAL ENCOUNTER (OUTPATIENT)
Dept: GENERAL RADIOLOGY | Facility: HOSPITAL | Age: 57
Discharge: HOME | End: 2022-04-27

## 2022-04-27 ENCOUNTER — LAB (OUTPATIENT)
Dept: LAB | Facility: HOSPITAL | Age: 57
End: 2022-04-27

## 2022-04-27 ENCOUNTER — OFFICE VISIT (OUTPATIENT)
Dept: INTERNAL MEDICINE | Facility: CLINIC | Age: 57
End: 2022-04-27

## 2022-04-27 VITALS
HEART RATE: 115 BPM | OXYGEN SATURATION: 98 % | BODY MASS INDEX: 43.03 KG/M2 | HEIGHT: 71 IN | WEIGHT: 307.4 LBS | SYSTOLIC BLOOD PRESSURE: 156 MMHG | TEMPERATURE: 98 F | DIASTOLIC BLOOD PRESSURE: 98 MMHG | RESPIRATION RATE: 16 BRPM

## 2022-04-27 DIAGNOSIS — I10 ESSENTIAL HYPERTENSION: ICD-10-CM

## 2022-04-27 DIAGNOSIS — M19.90 INFLAMMATORY ARTHRITIS: Primary | ICD-10-CM

## 2022-04-27 DIAGNOSIS — E66.01 CLASS 3 SEVERE OBESITY DUE TO EXCESS CALORIES WITH SERIOUS COMORBIDITY AND BODY MASS INDEX (BMI) OF 40.0 TO 44.9 IN ADULT: ICD-10-CM

## 2022-04-27 DIAGNOSIS — Z00.01 ANNUAL VISIT FOR GENERAL ADULT MEDICAL EXAMINATION WITH ABNORMAL FINDINGS: ICD-10-CM

## 2022-04-27 DIAGNOSIS — E11.9 CONTROLLED TYPE 2 DIABETES MELLITUS WITHOUT COMPLICATION, WITHOUT LONG-TERM CURRENT USE OF INSULIN: ICD-10-CM

## 2022-04-27 DIAGNOSIS — M13.0 POLYARTHRITIS: ICD-10-CM

## 2022-04-27 DIAGNOSIS — M05.80 SEROPOSITIVE EROSIVE RHEUMATOID ARTHRITIS: ICD-10-CM

## 2022-04-27 DIAGNOSIS — M19.90 INFLAMMATORY ARTHRITIS: ICD-10-CM

## 2022-04-27 DIAGNOSIS — M65.9 SYNOVITIS OF HAND: ICD-10-CM

## 2022-04-27 DIAGNOSIS — Z82.61 FAMILY HISTORY OF RHEUMATOID ARTHRITIS: ICD-10-CM

## 2022-04-27 LAB
ALBUMIN SERPL-MCNC: 4.8 G/DL (ref 3.5–5.2)
ALBUMIN/GLOB SERPL: 1.5 G/DL
ALP SERPL-CCNC: 141 U/L (ref 39–117)
ALT SERPL W P-5'-P-CCNC: 29 U/L (ref 1–41)
ANION GAP SERPL CALCULATED.3IONS-SCNC: 12.9 MMOL/L (ref 5–15)
AST SERPL-CCNC: 20 U/L (ref 1–40)
BILIRUB SERPL-MCNC: 0.2 MG/DL (ref 0–1.2)
BUN SERPL-MCNC: 15 MG/DL (ref 6–20)
BUN/CREAT SERPL: 13.6 (ref 7–25)
CALCIUM SPEC-SCNC: 9.8 MG/DL (ref 8.6–10.5)
CHLORIDE SERPL-SCNC: 98 MMOL/L (ref 98–107)
CHOLEST SERPL-MCNC: 165 MG/DL (ref 0–200)
CHROMATIN AB SERPL-ACNC: 15.8 IU/ML (ref 0–14)
CO2 SERPL-SCNC: 25.1 MMOL/L (ref 22–29)
CREAT SERPL-MCNC: 1.1 MG/DL (ref 0.76–1.27)
CRP SERPL-MCNC: 4.16 MG/DL (ref 0–0.5)
DEPRECATED RDW RBC AUTO: 42.2 FL (ref 37–54)
EGFRCR SERPLBLD CKD-EPI 2021: 79.8 ML/MIN/1.73
ERYTHROCYTE [DISTWIDTH] IN BLOOD BY AUTOMATED COUNT: 14.6 % (ref 12.3–15.4)
ERYTHROCYTE [SEDIMENTATION RATE] IN BLOOD: 64 MM/HR (ref 0–20)
GLOBULIN UR ELPH-MCNC: 3.1 GM/DL
GLUCOSE SERPL-MCNC: 144 MG/DL (ref 65–99)
HBA1C MFR BLD: 6.8 % (ref 4.8–5.6)
HCT VFR BLD AUTO: 38.2 % (ref 37.5–51)
HCV AB SER DONR QL: NORMAL
HDLC SERPL-MCNC: 48 MG/DL (ref 40–60)
HGB BLD-MCNC: 13 G/DL (ref 13–17.7)
LDLC SERPL CALC-MCNC: 100 MG/DL (ref 0–100)
LDLC/HDLC SERPL: 2.06 {RATIO}
MCH RBC QN AUTO: 27.5 PG (ref 26.6–33)
MCHC RBC AUTO-ENTMCNC: 34 G/DL (ref 31.5–35.7)
MCV RBC AUTO: 80.9 FL (ref 79–97)
PLATELET # BLD AUTO: 260 10*3/MM3 (ref 140–450)
PMV BLD AUTO: 9.4 FL (ref 6–12)
POTASSIUM SERPL-SCNC: 4.3 MMOL/L (ref 3.5–5.2)
PROT SERPL-MCNC: 7.9 G/DL (ref 6–8.5)
RBC # BLD AUTO: 4.72 10*6/MM3 (ref 4.14–5.8)
SODIUM SERPL-SCNC: 136 MMOL/L (ref 136–145)
TRIGL SERPL-MCNC: 91 MG/DL (ref 0–150)
TSH SERPL DL<=0.05 MIU/L-ACNC: 2.76 UIU/ML (ref 0.27–4.2)
VLDLC SERPL-MCNC: 17 MG/DL (ref 5–40)
WBC NRBC COR # BLD: 8.47 10*3/MM3 (ref 3.4–10.8)

## 2022-04-27 PROCEDURE — 84443 ASSAY THYROID STIM HORMONE: CPT

## 2022-04-27 PROCEDURE — 73130 X-RAY EXAM OF HAND: CPT

## 2022-04-27 PROCEDURE — 86431 RHEUMATOID FACTOR QUANT: CPT

## 2022-04-27 PROCEDURE — 99214 OFFICE O/P EST MOD 30 MIN: CPT | Performed by: INTERNAL MEDICINE

## 2022-04-27 PROCEDURE — 85027 COMPLETE CBC AUTOMATED: CPT

## 2022-04-27 PROCEDURE — 80061 LIPID PANEL: CPT

## 2022-04-27 PROCEDURE — 86140 C-REACTIVE PROTEIN: CPT

## 2022-04-27 PROCEDURE — 36415 COLL VENOUS BLD VENIPUNCTURE: CPT

## 2022-04-27 PROCEDURE — 80053 COMPREHEN METABOLIC PANEL: CPT

## 2022-04-27 PROCEDURE — 85652 RBC SED RATE AUTOMATED: CPT

## 2022-04-27 PROCEDURE — 86200 CCP ANTIBODY: CPT

## 2022-04-27 PROCEDURE — 83036 HEMOGLOBIN GLYCOSYLATED A1C: CPT

## 2022-04-27 PROCEDURE — 86803 HEPATITIS C AB TEST: CPT

## 2022-04-27 RX ORDER — METHYLPREDNISOLONE 4 MG/1
TABLET ORAL
Qty: 21 EACH | Refills: 0 | Status: SHIPPED | OUTPATIENT
Start: 2022-04-27 | End: 2022-05-13

## 2022-04-27 RX ORDER — CHLORTHALIDONE 25 MG/1
25 TABLET ORAL DAILY
Status: CANCELLED | OUTPATIENT
Start: 2022-04-27

## 2022-04-27 RX ORDER — NAPROXEN 500 MG/1
500 TABLET ORAL 2 TIMES DAILY WITH MEALS
Qty: 20 TABLET | Refills: 0 | Status: SHIPPED | OUTPATIENT
Start: 2022-04-27 | End: 2022-05-07

## 2022-04-27 RX ORDER — AMLODIPINE AND OLMESARTAN MEDOXOMIL 5; 20 MG/1; MG/1
1 TABLET ORAL DAILY
Qty: 90 TABLET | Refills: 1 | Status: CANCELLED | OUTPATIENT
Start: 2022-04-27

## 2022-04-27 RX ORDER — AMLODIPINE AND OLMESARTAN MEDOXOMIL 10; 40 MG/1; MG/1
1 TABLET ORAL DAILY
Status: CANCELLED | OUTPATIENT
Start: 2022-04-27

## 2022-04-27 NOTE — PROGRESS NOTES
"CC: right hand and elbow pain    History:  Ricardo Menchaca Jr. is a 56 y.o. male   He went to the ER about 10 days ago, but has had persistence of right hand and wrist pain and swelling along with right elbow pain and swelling.  His left knee is also causing him trouble.  He was given a dose of steroid in the hospital and treated with indomethacin as an outpatient, but he does not feel this has been helpful and he has not had any improvement in his symptoms at all.  Uric acid was checked in the ER and was found to be 6.2, making gout a much less likely etiology at this time.  Notably, his father had rheumatoid arthritis.       ROS:  Review of Systems   Musculoskeletal: Positive for arthralgias, gait problem and joint swelling.        reports that he has quit smoking. His smoking use included cigars. He has a 5.00 pack-year smoking history. He has never used smokeless tobacco. He reports that he does not drink alcohol and does not use drugs.      Current Outpatient Medications:   •  amlodipine-olmesartan (PACO) 5-20 MG per tablet, Take 1 tablet by mouth Daily., Disp: 90 tablet, Rfl: 1  •  atorvastatin (LIPITOR) 20 MG tablet, Take 1 tablet by mouth Daily., Disp: 90 tablet, Rfl: 1  •  cloNIDine (CATAPRES) 0.3 MG tablet, TAKE THREE TABLETS BY MOUTH TWICE A DAY, Disp: , Rfl:   •  furosemide (LASIX) 20 MG tablet, Take 20 mg by mouth Daily. FOR 30 DAYS, Disp: , Rfl:   •  methylPREDNISolone (MEDROL) 4 MG dose pack, Take as directed on package instructions., Disp: 21 each, Rfl: 0  •  naproxen (Naprosyn) 500 MG tablet, Take 1 tablet by mouth 2 (Two) Times a Day With Meals for 10 days., Disp: 20 tablet, Rfl: 0    OBJECTIVE:  /98 (BP Location: Left arm, Patient Position: Sitting, Cuff Size: Adult)   Pulse 115   Temp 98 °F (36.7 °C)   Resp 16   Ht 180.3 cm (71\")   Wt (!) 139 kg (307 lb 6.4 oz)   SpO2 98%   BMI 42.87 kg/m²    Physical Exam  Musculoskeletal:      Comments: Marked synovitis of the MCPs and PIPs of the " right hand as well as throughout the right wrist and right elbow.         Assessment/Plan     Diagnoses and all orders for this visit:    1. Inflammatory arthritis (Primary)  2. Polyarthritis  -     Sedimentation Rate; Future  -     C-reactive Protein; Future  -     Rheumatoid Factor; Future  -     Cyclic Citrul Peptide Antibody, IgG / IgA; Future  -     methylPREDNISolone (MEDROL) 4 MG dose pack; Take as directed on package instructions.  Dispense: 21 each; Refill: 0  -     XR hand 3+ vw bilateral; Future  -     naproxen (Naprosyn) 500 MG tablet; Take 1 tablet by mouth 2 (Two) Times a Day With Meals for 10 days.  Dispense: 20 tablet; Refill: 0  Laboratories for inflammatory markers as well as RF and CCP given his family history.  We will obtain x-rays of the bilateral hands to evaluate for erosions, especially since his right hand is more prominently affected at this time.  Medrol Dosepak is prescribed and he may also use NSAID for pain and inflammation relief.    3. Essential hypertension  Poorly controlled, BP goal for age is <140/90 per JNC 8 guidelines and Monitor on medications given his acute pain at this time.    4. Class 3 severe obesity due to excess calories with serious comorbidity and body mass index (BMI) of 40.0 to 44.9 in adult (HCC)  Class 3 Severe Obesity (BMI >=40). Obesity-related health conditions include the following: hypertension, diabetes mellitus and dyslipidemias. Obesity is unchanged. BMI is is above average; BMI management plan is completed. We discussed portion control and increasing exercise.    An After Visit Summary was printed and given to the patient at discharge.  Return for Next scheduled follow up.          Arturo Barahona D.O. 4/27/2022   Electronically signed.

## 2022-04-29 LAB — CCP IGA+IGG SERPL IA-ACNC: 17 UNITS (ref 0–19)

## 2022-05-13 ENCOUNTER — OFFICE VISIT (OUTPATIENT)
Dept: INTERNAL MEDICINE | Facility: CLINIC | Age: 57
End: 2022-05-13

## 2022-05-13 VITALS
SYSTOLIC BLOOD PRESSURE: 150 MMHG | HEART RATE: 98 BPM | OXYGEN SATURATION: 98 % | BODY MASS INDEX: 42.34 KG/M2 | DIASTOLIC BLOOD PRESSURE: 80 MMHG | TEMPERATURE: 97.6 F | WEIGHT: 302.4 LBS | HEIGHT: 71 IN | RESPIRATION RATE: 16 BRPM

## 2022-05-13 DIAGNOSIS — Z23 NEED FOR VACCINATION: ICD-10-CM

## 2022-05-13 DIAGNOSIS — M05.742 RHEUMATOID ARTHRITIS INVOLVING BOTH HANDS WITH POSITIVE RHEUMATOID FACTOR: ICD-10-CM

## 2022-05-13 DIAGNOSIS — I10 ESSENTIAL HYPERTENSION: Primary | ICD-10-CM

## 2022-05-13 DIAGNOSIS — M05.741 RHEUMATOID ARTHRITIS INVOLVING BOTH HANDS WITH POSITIVE RHEUMATOID FACTOR: ICD-10-CM

## 2022-05-13 DIAGNOSIS — E11.9 TYPE 2 DIABETES MELLITUS WITHOUT COMPLICATION, WITHOUT LONG-TERM CURRENT USE OF INSULIN: ICD-10-CM

## 2022-05-13 PROBLEM — M06.9 RHEUMATOID ARTHRITIS: Status: ACTIVE | Noted: 2022-05-13

## 2022-05-13 PROCEDURE — 90677 PCV20 VACCINE IM: CPT | Performed by: INTERNAL MEDICINE

## 2022-05-13 PROCEDURE — 96160 PT-FOCUSED HLTH RISK ASSMT: CPT | Performed by: INTERNAL MEDICINE

## 2022-05-13 PROCEDURE — 99214 OFFICE O/P EST MOD 30 MIN: CPT | Performed by: INTERNAL MEDICINE

## 2022-05-13 PROCEDURE — 1170F FXNL STATUS ASSESSED: CPT | Performed by: INTERNAL MEDICINE

## 2022-05-13 PROCEDURE — 1159F MED LIST DOCD IN RCRD: CPT | Performed by: INTERNAL MEDICINE

## 2022-05-13 PROCEDURE — G0009 ADMIN PNEUMOCOCCAL VACCINE: HCPCS | Performed by: INTERNAL MEDICINE

## 2022-05-13 PROCEDURE — 1125F AMNT PAIN NOTED PAIN PRSNT: CPT | Performed by: INTERNAL MEDICINE

## 2022-05-13 PROCEDURE — G0402 INITIAL PREVENTIVE EXAM: HCPCS | Performed by: INTERNAL MEDICINE

## 2022-05-13 RX ORDER — AMLODIPINE AND OLMESARTAN MEDOXOMIL 10; 40 MG/1; MG/1
1 TABLET ORAL DAILY
Qty: 90 TABLET | Refills: 1 | Status: SHIPPED | OUTPATIENT
Start: 2022-05-13 | End: 2022-10-26

## 2022-05-13 NOTE — PROGRESS NOTES
"CC: f/u hypertension and RA    History:  Ricardo Menchaca Jr. is a 56 y.o. male   He notes he has had ongoing issues with his blood pressure, though he has been adherent with his medications.  He has ongoing pain in his hands, knees, and elbows.  Previous evaluation showed a positive rheumatoid factor and he has strong family history of RA with joint destruction in both his father and sister.  He notes he did have some improvement in his pain with the steroid dosepak previously used as a diagnostic and therapeutic trial.       ROS:  Review of Systems   Respiratory: Negative for shortness of breath.    Cardiovascular: Negative for chest pain.   Musculoskeletal: Positive for arthralgias and joint swelling.        reports that he has quit smoking. His smoking use included cigars. He has a 5.00 pack-year smoking history. He has never used smokeless tobacco. He reports that he does not drink alcohol and does not use drugs.      Current Outpatient Medications:   •  amlodipine-olmesartan (PACO) 5-20 MG per tablet, Take 1 tablet by mouth Daily., Disp: 90 tablet, Rfl: 1  •  atorvastatin (LIPITOR) 20 MG tablet, Take 1 tablet by mouth Daily., Disp: 90 tablet, Rfl: 1  •  furosemide (LASIX) 20 MG tablet, Take 20 mg by mouth Daily. FOR 30 DAYS, Disp: , Rfl:   •  cloNIDine (CATAPRES) 0.3 MG tablet, TAKE THREE TABLETS BY MOUTH TWICE A DAY, Disp: , Rfl:     OBJECTIVE:  /80 (BP Location: Left arm, Patient Position: Sitting, Cuff Size: Adult)   Pulse 98   Temp 97.6 °F (36.4 °C)   Resp 16   Ht 180.3 cm (71\")   Wt (!) 137 kg (302 lb 6.4 oz)   SpO2 98%   BMI 42.18 kg/m²    Physical Exam  Constitutional:       General: He is not in acute distress.  Pulmonary:      Effort: Pulmonary effort is normal. No respiratory distress.   Neurological:      Mental Status: He is alert and oriented to person, place, and time.   Psychiatric:         Mood and Affect: Mood normal.         Behavior: Behavior normal.         Assessment/Plan   "   Diagnoses and all orders for this visit:    1. Essential hypertension (Primary)  -     amlodipine-olmesartan (Paul) 10-40 MG per tablet; Take 1 tablet by mouth Daily.  Dispense: 90 tablet; Refill: 1  Fair control, BP goal for age is <140/90 per JNC 8 guidelines and increase Paul.     2. Rheumatoid arthritis involving both hands with positive rheumatoid factor (HCC)  He has an appointment with Dr. Rodriguez next week. He has family history, positive RF, evidence of erosion on hand Xrays and improvement with steroid trial. He is encouraged that he will almost certainly have treatment options to improve his QOL and arrest joint destruction.     3. Type 2 diabetes mellitus without complication, without long-term current use of insulin (HCC)  4. Need for vaccination  -     Pneumococcal Conjugate Vaccine 20-Valent (PCV20)    An After Visit Summary was printed and given to the patient at discharge.  Return in about 6 months (around 11/13/2022).          Arturo Barahona D.O. 5/13/2022   Electronically signed.

## 2022-05-13 NOTE — PATIENT INSTRUCTIONS
Medicare Wellness  Personal Prevention Plan of Service     Date of Office Visit:    Encounter Provider:  Arturo Barahona DO  Place of Service:  [facility] INTERNAL MEDICINE  Patient Name: Ricardo Menchaca Jr.  :  1965    As part of the Medicare Wellness portion of your visit today, we are providing you with this personalized preventive plan of services (PPPS). This plan is based upon recommendations of the [place] Preventive Services Task Force (USPSTF) and the Advisory Committee on Immunization Practices (ACIP).    This lists the preventive care services that should be considered, and provides dates of when you are due. Items listed as completed are up-to-date and do not require any further intervention.    Health Maintenance   Topic Date Due    URINE MICROALBUMIN  Never done    Hepatitis B (1 of 3 - Risk 3-dose series) Never done    TDAP/TD VACCINES (1 - Tdap) Never done    ZOSTER VACCINE (1 of 2) Never done    DIABETIC FOOT EXAM  Never done    DIABETIC EYE EXAM  Never done    INFLUENZA VACCINE  2022    HEMOGLOBIN A1C  10/27/2022    LIPID PANEL  2023    ANNUAL WELLNESS VISIT  2023    COLORECTAL CANCER SCREENING  10/19/2031    HEPATITIS C SCREENING  Completed    COVID-19 Vaccine  Completed    Pneumococcal Vaccine 0-64  Completed       Orders Placed This Encounter   Procedures    Pneumococcal Conjugate Vaccine 20-Valent (PCV20)       Return in about 6 months (around 2022).

## 2022-05-13 NOTE — PROGRESS NOTES
The ABCs of the Annual Wellness Visit  [place] to Medicare Visit    No chief complaint on file.  See  note  Subjective {   History of Present Illness:  Ricardo Menchaca Jr. is a 56 y.o. male who presents for a  Welcome to Medicare Visit.    The following portions of the patient's history were reviewed and   updated as appropriate: allergies, current medications, past family history, past medical history, past social history, past surgical history and problem list.     Compared to one year ago, the patient feels his physical   health is worse.    Compared to one year ago, the patient feels his mental   health is worse.    Recent Hospitalizations:  He was not admitted to the hospital during the last year.       Current Medical Providers:  Patient Care Team:  Arturo Barahona DO as PCP - General (Internal Medicine)  Chente Elaine MD as Consulting Physician (Cardiology)    Outpatient Medications Prior to Visit   Medication Sig Dispense Refill   • atorvastatin (LIPITOR) 20 MG tablet Take 1 tablet by mouth Daily. 90 tablet 1   • furosemide (LASIX) 20 MG tablet Take 20 mg by mouth Daily. FOR 30 DAYS     • amlodipine-olmesartan (PACO) 5-20 MG per tablet Take 1 tablet by mouth Daily. 90 tablet 1   • cloNIDine (CATAPRES) 0.3 MG tablet TAKE THREE TABLETS BY MOUTH TWICE A DAY     • methylPREDNISolone (MEDROL) 4 MG dose pack Take as directed on package instructions. 21 each 0     No facility-administered medications prior to visit.       No opioid medication identified on active medication list. I have reviewed chart for other potential  high risk medication/s and harmful drug interactions in the elderly.          Aspirin is not on active medication list.  Aspirin use is not indicated based on review of current medical condition/s. Risk of harm outweighs potential benefits.  .    Patient Active Problem List   Diagnosis   • Cervical radiculopathy   • Class 3 severe obesity due to excess calories with serious  "comorbidity and body mass index (BMI) of 40.0 to 44.9 in adult (HCC)   • Former tobacco use   • Type 2 diabetes mellitus without complication, without long-term current use of insulin ([facility])   • Essential hypertension   • Idiopathic chronic gout of multiple sites without tophus   • Lumbosacral spondylosis without myelopathy   • Spinal stenosis of lumbar region without neurogenic claudication   • Rheumatoid arthritis (HCC)     Advance Care Planning  Advance Directive is not on file.  ACP discussion was held with the patient during this visit. Patient does not have an advance directive, information provided.          Objective      Vitals:    05/13/22 0908   BP: 150/80   BP Location: Left arm   Patient Position: Sitting   Cuff Size: Adult   Pulse: 98   Resp: 16   Temp: 97.6 °F (36.4 °C)   SpO2: 98%   Weight: (!) 137 kg (302 lb 6.4 oz)   Height: 180.3 cm (71\")     BMI Readings from Last 1 Encounters:   05/13/22 42.18 kg/m²   BMI is above normal parameters. Recommendations include: exercise counseling and nutrition counseling    Does the patient have evidence of cognitive impairment? No    Physical Exam    Lab Results   Component Value Date    TRIG 91 04/27/2022    HDL 48 04/27/2022     04/27/2022    VLDL 17 04/27/2022    HGBA1C 6.80 (H) 04/27/2022       Procedures       HEALTH RISK ASSESSMENT    Smoking Status:  Social History     Tobacco Use   Smoking Status Former Smoker   • Packs/day: 1.00   • Years: 5.00   • Pack years: 5.00   • Types: Cigars   Smokeless Tobacco Never Used   Tobacco Comment    I stopped smoking 20 years ago     Alcohol Consumption:  Social History     Substance and Sexual Activity   Alcohol Use No       Fall Risk Screen:    STEADI Fall Risk Assessment has not been completed.    Depression Screen:   PHQ-2/PHQ-9 Depression Screening 5/13/2022   Retired PHQ-9 Total Score -   Retired Total Score -   Little Interest or Pleasure in Doing Things 0-->not at all   Feeling Down, Depressed or Hopeless " 1-->several days   PHQ-9: Brief Depression Severity Measure Score 1       Health Habits and Functional and Cognitive Screening:  Functional & Cognitive Status 5/13/2022   Do you have difficulty preparing food and eating? No   Do you have difficulty bathing yourself, getting dressed or grooming yourself? No   Do you have difficulty using the toilet? No   Do you have difficulty moving around from place to place? Yes   Do you have trouble with steps or getting out of a bed or a chair? Yes   Current Diet Low Carb Diet   Dental Exam Up to date   Eye Exam Up to date   Exercise (times per week) 0 times per week   Current Exercises Include No Regular Exercise   Do you need help using the phone?  No   Are you deaf or do you have serious difficulty hearing?  No   Do you need help with transportation? No   Do you need help shopping? No   Do you need help preparing meals?  No   Do you need help with housework?  No   Do you need help with laundry? No   Do you need help taking your medications? No   Do you need help managing money? No   Do you ever drive or ride in a car without wearing a seat belt? No   Have you felt unusual stress, anger or loneliness in the last month? No   Who do you live with? Spouse   If you need help, do you have trouble finding someone available to you? No   Have you been bothered in the last four weeks by sexual problems? No   Do you have difficulty concentrating, remembering or making decisions? No       Visual Acuity:    No exam data present    Age-appropriate Screening Schedule:  Refer to the list below for future screening recommendations based on patient's age, sex and/or medical conditions. Orders for these recommended tests are listed in the plan section. The patient has been provided with a written plan.    Health Maintenance   Topic Date Due   • URINE MICROALBUMIN  Never done   • TDAP/TD VACCINES (1 - Tdap) Never done   • ZOSTER VACCINE (1 of 2) Never done   • DIABETIC FOOT EXAM  Never done   •  DIABETIC EYE EXAM  Never done   • INFLUENZA VACCINE  08/01/2022   • HEMOGLOBIN A1C  10/27/2022   • LIPID PANEL  04/27/2023      EKG from 5/2020 reviewed and given no new symptoms, a new baseline as not obtained today.     Assessment & Plan   CMS Preventative Services Quick Reference  Risk Factors Identified During Encounter  Cardiovascular Disease  Chronic Pain   Immunizations Discussed/Encouraged (specific Immunizations; Tdap, Prevnar 20 (Pneumococcal 20-valent conjugate) and Shingrix  Inactivity/Sedentary  Obesity/Overweight   The above risks/problems have been discussed with the patient.  Pertinent information has been shared with the patient in the After Visit Summary.  Follow up plans and orders are seen below in the Assessment/Plan Section.    Diagnoses and all orders for this visit:    1. Essential hypertension (Primary)  -     amlodipine-olmesartan (Paul) 10-40 MG per tablet; Take 1 tablet by mouth Daily.  Dispense: 90 tablet; Refill: 1    2. Rheumatoid arthritis involving both hands with positive rheumatoid factor (HCC)    3. Type 2 diabetes mellitus without complication, without long-term current use of insulin (HCC)  -     Pneumococcal Conjugate Vaccine 20-Valent (PCV20)    4. Need for vaccination  -     Pneumococcal Conjugate Vaccine 20-Valent (PCV20)        Follow Up:   Return in about 6 months (around 11/13/2022).     An After Visit Summary and PPPS were made available to the patient.

## 2022-10-26 DIAGNOSIS — I10 ESSENTIAL HYPERTENSION: ICD-10-CM

## 2022-10-26 RX ORDER — AMLODIPINE AND OLMESARTAN MEDOXOMIL 10; 40 MG/1; MG/1
1 TABLET ORAL DAILY
Qty: 90 TABLET | Refills: 1 | Status: SHIPPED | OUTPATIENT
Start: 2022-10-26 | End: 2023-02-06 | Stop reason: SDUPTHER

## 2022-11-14 ENCOUNTER — TELEPHONE (OUTPATIENT)
Dept: INTERNAL MEDICINE | Facility: CLINIC | Age: 57
End: 2022-11-14

## 2022-11-14 NOTE — TELEPHONE ENCOUNTER
"Patient stated he has had diarrhea for the past 3 days and has vomited. I asked if he had any URI symptoms and he said he just has some sinus symptoms, no cough.  Patient was offered an appointment.  He said he would just like something to be prescribed for symptoms because he has already had two \"accidents\" this morning and does not want to come into the office.  "

## 2022-11-14 NOTE — TELEPHONE ENCOUNTER
Caller: JOSSY SCHNEIDER    Relationship: Emergency Contact    Best call back number: [phone]    What medication are you requesting:   PCP RECOMMENDATION     What are your current symptoms:   DIARRHEA     How long have you been experiencing symptoms:   COUPLE DAYS    Have you had these symptoms before:    [] Yes  [x] No    Have you been treated for these symptoms before:   [] Yes  [x] No    If a prescription is needed, what is your preferred pharmacy and phone number: [facility] [address] [place], [place] [address] [facility] [phone] [facility] [phone]      Additional notes:

## 2022-11-15 DIAGNOSIS — R11.2 NAUSEA VOMITING AND DIARRHEA: Primary | ICD-10-CM

## 2022-11-15 DIAGNOSIS — R19.7 NAUSEA VOMITING AND DIARRHEA: Primary | ICD-10-CM

## 2022-11-15 RX ORDER — ONDANSETRON 4 MG/1
4 TABLET, ORALLY DISINTEGRATING ORAL EVERY 8 HOURS PRN
Qty: 30 TABLET | Refills: 0 | Status: SHIPPED | OUTPATIENT
Start: 2022-11-15 | End: 2023-02-06

## 2022-11-15 RX ORDER — LOPERAMIDE HYDROCHLORIDE 2 MG/1
2 TABLET ORAL 4 TIMES DAILY PRN
Qty: 30 TABLET | Refills: 0 | Status: SHIPPED | OUTPATIENT
Start: 2022-11-15 | End: 2022-11-22

## 2022-11-15 NOTE — TELEPHONE ENCOUNTER
Patient informed the prescriptions were sent to his pharmacy.  Patient advised to call the office if symptoms do not improve.

## 2022-11-15 NOTE — TELEPHONE ENCOUNTER
Patient has not taken Imodium yet. He would like prescriptions for Imodium and nausea medication.

## 2023-01-30 ENCOUNTER — TELEPHONE (OUTPATIENT)
Dept: INTERNAL MEDICINE | Facility: CLINIC | Age: 58
End: 2023-01-30

## 2023-01-30 NOTE — TELEPHONE ENCOUNTER
Caller: LILY  [employer]    Relationship: Other    Best call back number:  [phone]    What is the best time to reach you:  DURING BUSINESS HOURS    Who are you requesting to speak with (clinical staff, provider,  specific staff member):  CLINICAL    What was the call regarding:  IF PROVIDER'S OFFICE RECEIVED FAX FOR ORDER OF [employer]OWANCE.    Do you require a callback:  IF NEEDED.  CALLER'S FAX -[phone]

## 2023-01-30 NOTE — TELEPHONE ENCOUNTER
Patient has not been seen. Needs to schedule an appt for f/u. Until then, we cannot sign the forms.

## 2023-02-06 ENCOUNTER — TELEPHONE (OUTPATIENT)
Dept: INTERNAL MEDICINE | Facility: CLINIC | Age: 58
End: 2023-02-06

## 2023-02-06 ENCOUNTER — OFFICE VISIT (OUTPATIENT)
Dept: INTERNAL MEDICINE | Facility: CLINIC | Age: 58
End: 2023-02-06
Payer: MEDICARE

## 2023-02-06 VITALS
RESPIRATION RATE: 16 BRPM | TEMPERATURE: 97.3 F | OXYGEN SATURATION: 98 % | HEIGHT: 71 IN | HEART RATE: 101 BPM | SYSTOLIC BLOOD PRESSURE: 114 MMHG | WEIGHT: 315 LBS | DIASTOLIC BLOOD PRESSURE: 88 MMHG | BODY MASS INDEX: 44.1 KG/M2

## 2023-02-06 DIAGNOSIS — E66.01 CLASS 3 SEVERE OBESITY DUE TO EXCESS CALORIES WITH SERIOUS COMORBIDITY AND BODY MASS INDEX (BMI) OF 40.0 TO 44.9 IN ADULT: ICD-10-CM

## 2023-02-06 DIAGNOSIS — E11.9 TYPE 2 DIABETES MELLITUS WITHOUT COMPLICATION, WITHOUT LONG-TERM CURRENT USE OF INSULIN: ICD-10-CM

## 2023-02-06 DIAGNOSIS — I10 ESSENTIAL HYPERTENSION: ICD-10-CM

## 2023-02-06 DIAGNOSIS — M10.9 ACUTE GOUT OF LEFT ANKLE, UNSPECIFIED CAUSE: Primary | ICD-10-CM

## 2023-02-06 LAB
EXPIRATION DATE: NORMAL
HBA1C MFR BLD: 6 %
Lab: NORMAL

## 2023-02-06 PROCEDURE — 83036 HEMOGLOBIN GLYCOSYLATED A1C: CPT | Performed by: NURSE PRACTITIONER

## 2023-02-06 PROCEDURE — 90686 IIV4 VACC NO PRSV 0.5 ML IM: CPT | Performed by: NURSE PRACTITIONER

## 2023-02-06 PROCEDURE — 3044F HG A1C LEVEL LT 7.0%: CPT | Performed by: NURSE PRACTITIONER

## 2023-02-06 PROCEDURE — 91312 COVID-19 (PFIZER) BIVALENT BOOSTER 12+YRS: CPT | Performed by: NURSE PRACTITIONER

## 2023-02-06 PROCEDURE — 99214 OFFICE O/P EST MOD 30 MIN: CPT | Performed by: NURSE PRACTITIONER

## 2023-02-06 PROCEDURE — 0124A COVID-19 (PFIZER) BIVALENT BOOSTER 12+YRS: CPT | Performed by: NURSE PRACTITIONER

## 2023-02-06 PROCEDURE — G0008 ADMIN INFLUENZA VIRUS VAC: HCPCS | Performed by: NURSE PRACTITIONER

## 2023-02-06 RX ORDER — COLCHICINE 0.6 MG/1
1 TABLET ORAL EVERY 12 HOURS SCHEDULED
COMMUNITY
Start: 2023-01-08

## 2023-02-06 RX ORDER — AMLODIPINE AND OLMESARTAN MEDOXOMIL 10; 40 MG/1; MG/1
1 TABLET ORAL DAILY
Qty: 90 TABLET | Refills: 1 | Status: SHIPPED | OUTPATIENT
Start: 2023-02-06

## 2023-02-06 RX ORDER — METHYLPREDNISOLONE 4 MG/1
TABLET ORAL
Qty: 21 TABLET | Refills: 0 | Status: SHIPPED | OUTPATIENT
Start: 2023-02-06 | End: 2023-03-14

## 2023-02-06 RX ORDER — FLASH GLUCOSE SENSOR
KIT MISCELLANEOUS
Qty: 1 EACH | Refills: 0 | Status: SHIPPED | OUTPATIENT
Start: 2023-02-06 | End: 2023-03-24

## 2023-02-06 NOTE — PROGRESS NOTES
CC: follow up for gout and diabetes     HPI     Ricardo Menchaca Jr. is a 57 y.o. male presents for the above problem. [facility] and was started on Ozempic at that time. He is tolerating this medication well.  He monitors glucose levels twice per day and says readings range from 120-140 fasting. He would like to have a freestyle Jannet to monitor glucose levels. He does mention weight gain despite starting this. He feels this is due to recent gout flare to left ankle, he has pain that limits his activity. He takes colchicine as needed but no longer takes allopurinol.          ROS:  Review of Systems   Constitutional: Positive for unexpected weight change. Negative for fever.   HENT: Negative.    Respiratory: Negative.    Cardiovascular: Negative.    Gastrointestinal: Negative.    Genitourinary: Negative.    Musculoskeletal: Positive for arthralgias. Negative for back pain.          reports that he has quit smoking. His smoking use included cigars and cigarettes. He has a 5.00 pack-year smoking history. He has never used smokeless tobacco. He reports that he does not drink alcohol and does not use drugs.    Current Outpatient Medications:   •  amlodipine-olmesartan (PACO) 10-40 MG per tablet, Take 1 tablet by mouth Daily., Disp: 90 tablet, Rfl: 1  •  colchicine 0.6 MG tablet, Take 1 tablet by mouth Every 12 (Twelve) Hours., Disp: , Rfl:   •  DULOXETINE HCL PO, Take  by mouth Daily., Disp: , Rfl:   •  Semaglutide, 1 MG/DOSE, (OZEMPIC) 2 MG/1.5ML solution pen-injector, Inject 1 mg under the skin into the appropriate area as directed 1 (One) Time Per Week., Disp: , Rfl:   •  atorvastatin (LIPITOR) 20 MG tablet, Take 1 tablet by mouth Daily., Disp: 90 tablet, Rfl: 1  •  Continuous Blood Gluc Sensor (FreeStyle Jannet Sensor System), Every 10 (Ten) Days., Disp: 1 each, Rfl: 0  •  furosemide (LASIX) 20 MG tablet, Take 20 mg by mouth Daily. FOR 30 DAYS, Disp: , Rfl:   •  methylPREDNISolone  "(MEDROL) 4 MG dose pack, Take as directed on package instructions., Disp: 21 tablet, Rfl: 0    OBJECTIVE:  /88 (BP Location: Left arm, Patient Position: Sitting, Cuff Size: Large Adult)   Pulse 101   Temp 97.3 °F (36.3 °C) (Temporal)   Resp 16   Ht 180.3 cm (71\")   Wt (!) 145 kg (319 lb)   SpO2 98%   BMI 44.49 kg/m²    Physical Exam  Constitutional:       General: He is not in acute distress.  Cardiovascular:      Rate and Rhythm: Normal rate and regular rhythm.      Heart sounds: Normal heart sounds.   Pulmonary:      Effort: Pulmonary effort is normal.      Breath sounds: Normal breath sounds.   Abdominal:      General: Bowel sounds are normal.      Tenderness: There is no abdominal tenderness.   Musculoskeletal:         General: Swelling and tenderness present.      Comments: Left ankle          ASSESSMENT/PLAN:     Diagnoses and all orders for this visit:    1. Acute gout of left ankle, unspecified cause (Primary)  -     methylPREDNISolone (MEDROL) 4 MG dose pack; Take as directed on package instructions.  Dispense: 21 tablet; Refill: 0   Discussed colchicine use with gout flare. Advised to monitor carbohydrate intake and glucose while taking steroids.     2. Essential hypertension  -     amlodipine-olmesartan (PACO) 10-40 MG per tablet; Take 1 tablet by mouth Daily.  Dispense: 90 tablet; Refill: 1  Well controlled, BP goal for age is <140/90 per JNC 8 guidelines and continue current medications    3. Type 2 diabetes mellitus without complication, without long-term current use of insulin (HCC)  -     POC Glycosylated Hemoglobin (Hb A1C)  -     Continuous Blood Gluc Sensor (FreeStyle Jannet Sensor System); Every 10 (Ten) Days.  Dispense: 1 each; Refill: 0  Per patient A1C 7-8%, unsure of exact lab value when Ozempic was started. Today A1C improved to 6%. Aware that insurance may not cover preferred glucometer as he is not on daily insulin.     4. Class 3 severe obesity due to excess calories with " serious comorbidity and body mass index (BMI) of 40.0 to 44.9 in adult ([facility])  Class 3 Severe Obesity (BMI >=40). Obesity-related health conditions include the following: obstructive sleep apnea, hypertension, coronary heart disease, diabetes mellitus and dyslipidemias. Obesity is worsening. BMI is is above average; BMI management plan is completed. We discussed low calorie, low carb based diet program, portion control and increasing exercise.    Other orders  -     FluLaval/Fluzone >6 mos (4121-1481)  -     COVID-19 Bivalent Booster (Pfizer) 12+yrs    An After Visit Summary was printed and given to the patient at discharge.  Return in about 3 months (around 5/18/2023) for Annual physical with Dr. Barahona .          SUKI Valladares 2/6/2023   Electronically signed.

## 2023-02-14 ENCOUNTER — HOSPITAL ENCOUNTER (EMERGENCY)
Age: 58
Discharge: HOME | End: 2023-02-14
Payer: OTHER GOVERNMENT

## 2023-02-14 ENCOUNTER — APPOINTMENT (OUTPATIENT)
Dept: CT IMAGING | Age: 58
End: 2023-02-14
Payer: OTHER GOVERNMENT

## 2023-02-14 VITALS
SYSTOLIC BLOOD PRESSURE: 159 MMHG | BODY MASS INDEX: 42.66 KG/M2 | HEIGHT: 72 IN | WEIGHT: 315 LBS | RESPIRATION RATE: 16 BRPM | DIASTOLIC BLOOD PRESSURE: 90 MMHG | OXYGEN SATURATION: 98 % | TEMPERATURE: 98.6 F | HEART RATE: 111 BPM

## 2023-02-14 DIAGNOSIS — S16.1XXA ACUTE STRAIN OF NECK MUSCLE, INITIAL ENCOUNTER: Primary | ICD-10-CM

## 2023-02-14 DIAGNOSIS — S09.90XA INJURY OF HEAD, INITIAL ENCOUNTER: ICD-10-CM

## 2023-02-14 PROCEDURE — 70450 CT HEAD/BRAIN W/O DYE: CPT

## 2023-02-14 PROCEDURE — 72125 CT NECK SPINE W/O DYE: CPT

## 2023-02-14 PROCEDURE — 99284 EMERGENCY DEPT VISIT MOD MDM: CPT

## 2023-02-14 PROCEDURE — 96372 THER/PROPH/DIAG INJ SC/IM: CPT

## 2023-02-14 PROCEDURE — 6360000002 HC RX W HCPCS: Performed by: NURSE PRACTITIONER

## 2023-02-14 RX ORDER — ORPHENADRINE CITRATE 30 MG/ML
60 INJECTION INTRAMUSCULAR; INTRAVENOUS ONCE
Status: COMPLETED | OUTPATIENT
Start: 2023-02-14 | End: 2023-02-14

## 2023-02-14 RX ORDER — CYCLOBENZAPRINE HCL 10 MG
10 TABLET ORAL 2 TIMES DAILY PRN
Qty: 10 TABLET | Refills: 0 | Status: SHIPPED | OUTPATIENT
Start: 2023-02-14 | End: 2023-02-14 | Stop reason: SDUPTHER

## 2023-02-14 RX ORDER — CYCLOBENZAPRINE HCL 10 MG
10 TABLET ORAL 2 TIMES DAILY PRN
Qty: 10 TABLET | Refills: 0 | Status: SHIPPED | OUTPATIENT
Start: 2023-02-14

## 2023-02-14 RX ADMIN — ORPHENADRINE CITRATE 60 MG: 30 INJECTION INTRAMUSCULAR; INTRAVENOUS at 17:49

## 2023-02-14 ASSESSMENT — ENCOUNTER SYMPTOMS
ABDOMINAL PAIN: 0
VOMITING: 0
NAUSEA: 0
DIARRHEA: 0
COUGH: 0
SHORTNESS OF BREATH: 0

## 2023-02-14 ASSESSMENT — PAIN SCALES - GENERAL: PAINLEVEL_OUTOF10: 4

## 2023-02-14 NOTE — ED PROVIDER NOTES
[facility] EMERGENCY DEPT  EMERGENCY DEPARTMENT ENCOUNTER      Pt Name: Ralph Styles  MRN: [id number]  Adriángfisauro 1965  Date of evaluation: 2/14/2023  Provider: Mendel Moynahan, APRN - LUIS    CHIEF COMPLAINT       Chief Complaint   Patient presents with    Fall     Tripped over dog back of head hurts          HISTORY OF PRESENT ILLNESS   (Location/Symptom, Timing/Onset, Context/Setting, Quality, Duration, Modifying Factors, Severity)  Note limiting factors. Ralph Styles is a 56 y.o. male who presents to the emergency department with concern for headache and neck pain after tripping over dog and injuring himself last PM. No LOC. No severe headache. No blood thinners. Reports NSAID, muscle rub at home without relief. No numbness, tingling, weakness. HPI    Nursing Notes were reviewed. REVIEW OF SYSTEMS    (2-9 systems for level 4, 10 or more for level 5)     Review of Systems   Constitutional:  Negative for chills and fever. HENT:  Negative for congestion. Respiratory:  Negative for cough and shortness of breath. Cardiovascular:  Negative for chest pain and palpitations. Gastrointestinal:  Negative for abdominal pain, diarrhea, nausea and vomiting. Musculoskeletal:  Positive for neck pain. Neurological:  Positive for headaches. Negative for dizziness, syncope, weakness and light-headedness. Except as noted above the remainder of the review of systems was reviewed and negative.        PAST MEDICAL HISTORY     Past Medical History:   Diagnosis Date    Anxiety     Arthritis     Chronic back pain     Controlled type 2 diabetes mellitus without complication, without long-term current use of insulin ([address] 1/5/2017    Depression     Diabetes mellitus ([address]     Gout     Hyperlipidemia     Hypertension     Obesity     Umbilical hernia          SURGICAL HISTORY       Past Surgical History:   Procedure Laterality Date    BACK SURGERY      COLONOSCOPY      LUMBAR [address] SURGERY  2008    OTHER SURGICAL HISTORY      cat scratch fever in left groin    REPAIR UMBILICAL KVPI,1+C/Q,DYWXN N/A     HERNIA UMBILICAL REPAIR WITH MESH  performed by Ashlee Mario MD at [address]       Previous Medications    AMITRIPTYLINE (ELAVIL) 50 MG TABLET    Take 50 mg by mouth nightly    AMLODIPINE (NORVASC) 5 MG TABLET    Take 2 tablets by mouth daily    ATENOLOL (TENORMIN) 50 MG TABLET    Take 100 mg by mouth daily    CLONIDINE (CATAPRES) 0.3 MG TABLET    TAKE THREE TABLETS BY MOUTH TWICE A DAY    COLCHICINE (COLCRYS) 0.6 MG TABLET    Take 0.6 mg by mouth daily as needed Indications: Gout     DICLOFENAC (VOLTAREN) 75 MG EC TABLET    Take 75 mg by mouth 2 times daily Indications: Arthritis     GLUCOSE BLOOD VI TEST STRIPS (EXACTECH TEST) STRIP    1 each by In Vitro route daily. Test sugars daily and prn DX:250.00    GLUCOSE MONITORING KIT (FREESTYLE) MONITORING KIT    1 kit by Does not apply route daily as needed. DX:250.00    HYDROCHLOROTHIAZIDE (HYDRODIURIL) 25 MG TABLET    Take 1 tablet by mouth daily    LANCETS MISC    DX: 250.00  Test sugar daily and prn    METFORMIN (GLUCOPHAGE) 1000 MG TABLET    Take 1,000 mg by mouth 2 times daily (with meals) Indications: Diabetes     PRAZOSIN (MINIPRESS) 2 MG CAPSULE    Take 2 mg by mouth nightly Indications: Frightening Dreams     SIMVASTATIN (ZOCOR) 40 MG TABLET    Take 1 tablet by mouth every evening. ALLERGIES     Lortab [hydrocodone-acetaminophen] and Penicillins    FAMILY HISTORY       Family History   Problem Relation Age of Onset    Diabetes Mother     High Blood Pressure Mother     Cancer Father           SOCIAL HISTORY       Social History     Socioeconomic History    Marital status:    Tobacco Use    Smoking status: Former     Types: Cigarettes     Quit date: 2011     Years since quittin.0    Smokeless tobacco: Never   Substance and Sexual Activity    Alcohol use: No    Drug use:  No SCREENINGS         Shmuel Coma Scale  Eye Opening: Spontaneous  Best Verbal Response: Oriented  Best Motor Response: Obeys commands  Shmuel Coma Scale Score: 15                     CIWA Assessment  BP: (!) 159/90  Heart Rate: (!) 111                 PHYSICAL EXAM    (up to 7 for level 4, 8 or more for level 5)     ED Triage Vitals [02/14/23 1303]   BP Temp Temp Source Heart Rate Resp SpO2 Height Weight   (!) 159/90 98.6 °F (37 °C) Oral (!) 111 16 98 % 6' (1.829 m) (!) 330 lb (149.7 kg)       Physical Exam  Vitals reviewed. Constitutional:       General: He is not in acute distress. Appearance: Normal appearance. He is not ill-appearing, toxic-appearing or diaphoretic. HENT:      Head: Normocephalic and atraumatic. Right Ear: Tympanic membrane, ear canal and external ear normal.      Left Ear: Tympanic membrane, ear canal and external ear normal.      Nose: Nose normal.      Mouth/Throat:      Mouth: Mucous membranes are moist.      Pharynx: Oropharynx is clear. Eyes:      Extraocular Movements: Extraocular movements intact. Conjunctiva/sclera: Conjunctivae normal.      Pupils: Pupils are equal, round, and reactive to light. Cardiovascular:      Rate and Rhythm: Normal rate and regular rhythm. Pulses: Normal pulses. Heart sounds: Normal heart sounds. Pulmonary:      Effort: Pulmonary effort is normal.      Breath sounds: Normal breath sounds. Abdominal:      General: Bowel sounds are normal. There is no distension. Palpations: Abdomen is soft. Tenderness: There is no abdominal tenderness. There is no right CVA tenderness, left CVA tenderness or guarding. Musculoskeletal:         General: Normal range of motion. Cervical back: Neck supple. No tenderness. Skin:     General: Skin is warm and dry. Capillary Refill: Capillary refill takes less than 2 seconds. Neurological:      General: No focal deficit present.       Mental Status: He is alert and oriented to person, place, and time. Cranial Nerves: No cranial nerve deficit. Sensory: No sensory deficit. Motor: No weakness. Coordination: Coordination normal.       DIAGNOSTIC RESULTS     EKG: All EKG's are interpreted by the Emergency Department Physician who either signs or Co-signs this chart in the absence of a cardiologist.        RADIOLOGY:   Non-plain film images such as CT, Ultrasound and MRI are read by the radiologist. Plain radiographic images are visualized and preliminarily interpreted by the emergency physician with the below findings:        Interpretation per the Radiologist below, if available at the time of this note:    CT CSpine W/O Contrast   Final Result   No acute abnormality. Cervical spondylosis. No acute abnormality. Recommendation: Follow up as clinically indicated. All CT scans at this facility utilize dose modulation, iterative reconstruction, and/or weight based dosing when appropriate to reduce radiation dose to as low as reasonably achievable. Dictated and Electronically Signed by Rosales Dunlap MD, SA CERTIFIED at [id number] 06:43:24 PM EST               CT Head W/O Contrast   Final Result   No acute intracranial abnormality. ED BEDSIDE ULTRASOUND:   Performed by ED Physician - none    LABS:  Labs Reviewed - No data to display    All other labs were within normal range or not returned as of this dictation. EMERGENCY DEPARTMENT COURSE and DIFFERENTIAL DIAGNOSIS/MDM:   Vitals:    Vitals:    02/14/23 1303   BP: (!) 159/90   Pulse: (!) 111   Resp: 16   Temp: 98.6 °F (37 °C)   TempSrc: Oral   SpO2: 98%   Weight: (!) 330 lb (149.7 kg)   Height: 6' (1.829 m)         Medical Decision Making  Amount and/or Complexity of Data Reviewed  Radiology: ordered. Decision-making details documented in ED Course. Risk  Prescription drug management.             REASSESSMENT          CRITICAL CARE TIME       CONSULTS:  None    PROCEDURES:  Unless otherwise noted below, none     Procedures         FINAL IMPRESSION      1. Acute strain of neck muscle, initial encounter    2. Injury of head, initial encounter          DISPOSITION/PLAN   DISPOSITION Decision To Discharge 02/14/2023 05:50:55 PM      PATIENT REFERRED TO:  Rhiannon Pearce  Maddy Pal[facility]  [phone]    Call in 1 day      DISCHARGE MEDICATIONS:  New Prescriptions    CYCLOBENZAPRINE (FLEXERIL) 10 MG TABLET    Take 1 tablet by mouth 2 times daily as needed for Muscle spasms     Controlled Substances Monitoring:     RX Monitoring 4/27/2021   Attestation The Prescription Monitoring Report for this patient was reviewed today. Periodic Controlled Substance Monitoring Possible medication side effects, risk of tolerance/dependence & alternative treatments discussed. ;No signs of potential drug abuse or diversion identified.        (Please note that portions of this note were completed with a voice recognition program.  Efforts were made to edit the dictations but occasionally words are mis-transcribed.)    WILMAN Roberto CNP (electronically signed)  Attending Emergency Physician          WILMAN Roberto CNP  02/14/23 [id number]

## 2023-03-24 ENCOUNTER — OFFICE VISIT (OUTPATIENT)
Dept: INTERNAL MEDICINE | Facility: CLINIC | Age: 58
End: 2023-03-24
Payer: MEDICARE

## 2023-03-24 ENCOUNTER — APPOINTMENT (OUTPATIENT)
Dept: ULTRASOUND IMAGING | Facility: HOSPITAL | Age: 58
End: 2023-03-24
Payer: MEDICARE

## 2023-03-24 ENCOUNTER — APPOINTMENT (OUTPATIENT)
Dept: GENERAL RADIOLOGY | Facility: HOSPITAL | Age: 58
End: 2023-03-24
Payer: MEDICARE

## 2023-03-24 ENCOUNTER — HOSPITAL ENCOUNTER (EMERGENCY)
Facility: HOSPITAL | Age: 58
Discharge: HOME | End: 2023-03-24
Admitting: EMERGENCY MEDICINE
Payer: MEDICARE

## 2023-03-24 VITALS
SYSTOLIC BLOOD PRESSURE: 130 MMHG | RESPIRATION RATE: 17 BRPM | TEMPERATURE: 98 F | HEIGHT: 71 IN | OXYGEN SATURATION: 96 % | DIASTOLIC BLOOD PRESSURE: 76 MMHG | BODY MASS INDEX: 44.1 KG/M2 | HEART RATE: 68 BPM | WEIGHT: 315 LBS

## 2023-03-24 VITALS
DIASTOLIC BLOOD PRESSURE: 80 MMHG | HEART RATE: 86 BPM | OXYGEN SATURATION: 98 % | SYSTOLIC BLOOD PRESSURE: 138 MMHG | TEMPERATURE: 98.2 F

## 2023-03-24 DIAGNOSIS — M25.572 ACUTE LEFT ANKLE PAIN: Primary | ICD-10-CM

## 2023-03-24 DIAGNOSIS — M10.9 GOUTY ARTHRITIS: Primary | ICD-10-CM

## 2023-03-24 DIAGNOSIS — M79.605 LEFT LEG PAIN: ICD-10-CM

## 2023-03-24 DIAGNOSIS — M79.672 ACUTE FOOT PAIN, LEFT: ICD-10-CM

## 2023-03-24 DIAGNOSIS — E11.9 TYPE 2 DIABETES MELLITUS WITHOUT COMPLICATION, WITHOUT LONG-TERM CURRENT USE OF INSULIN: ICD-10-CM

## 2023-03-24 LAB
ALBUMIN SERPL-MCNC: 4.4 G/DL (ref 3.5–5.2)
ALBUMIN/GLOB SERPL: 1.5 G/DL
ALP SERPL-CCNC: 117 U/L (ref 39–117)
ALT SERPL W P-5'-P-CCNC: 32 U/L (ref 1–41)
ANION GAP SERPL CALCULATED.3IONS-SCNC: 11 MMOL/L (ref 5–15)
AST SERPL-CCNC: 41 U/L (ref 1–40)
BASOPHILS # BLD AUTO: 0.04 10*3/MM3 (ref 0–0.2)
BASOPHILS NFR BLD AUTO: 0.6 % (ref 0–1.5)
BILIRUB SERPL-MCNC: 0.2 MG/DL (ref 0–1.2)
BUN SERPL-MCNC: 28 MG/DL (ref 6–20)
BUN/CREAT SERPL: 20.4 (ref 7–25)
CALCIUM SPEC-SCNC: 9.1 MG/DL (ref 8.6–10.5)
CHLORIDE SERPL-SCNC: 104 MMOL/L (ref 98–107)
CO2 SERPL-SCNC: 24 MMOL/L (ref 22–29)
CREAT SERPL-MCNC: 1.37 MG/DL (ref 0.76–1.27)
DEPRECATED RDW RBC AUTO: 49.4 FL (ref 37–54)
EGFRCR SERPLBLD CKD-EPI 2021: 60.9 ML/MIN/1.73
EOSINOPHIL # BLD AUTO: 0.2 10*3/MM3 (ref 0–0.4)
EOSINOPHIL NFR BLD AUTO: 3.2 % (ref 0.3–6.2)
ERYTHROCYTE [DISTWIDTH] IN BLOOD BY AUTOMATED COUNT: 15.2 % (ref 12.3–15.4)
GLOBULIN UR ELPH-MCNC: 3 GM/DL
GLUCOSE SERPL-MCNC: 99 MG/DL (ref 65–99)
HCT VFR BLD AUTO: 40.8 % (ref 37.5–51)
HGB BLD-MCNC: 12.6 G/DL (ref 13–17.7)
IMM GRANULOCYTES # BLD AUTO: 0.01 10*3/MM3 (ref 0–0.05)
IMM GRANULOCYTES NFR BLD AUTO: 0.2 % (ref 0–0.5)
INR PPP: 0.96 (ref 0.91–1.09)
LYMPHOCYTES # BLD AUTO: 3.32 10*3/MM3 (ref 0.7–3.1)
LYMPHOCYTES NFR BLD AUTO: 52.9 % (ref 19.6–45.3)
MCH RBC QN AUTO: 27.5 PG (ref 26.6–33)
MCHC RBC AUTO-ENTMCNC: 30.9 G/DL (ref 31.5–35.7)
MCV RBC AUTO: 89.1 FL (ref 79–97)
MONOCYTES # BLD AUTO: 0.73 10*3/MM3 (ref 0.1–0.9)
MONOCYTES NFR BLD AUTO: 11.6 % (ref 5–12)
NEUTROPHILS NFR BLD AUTO: 1.98 10*3/MM3 (ref 1.7–7)
NEUTROPHILS NFR BLD AUTO: 31.5 % (ref 42.7–76)
NRBC BLD AUTO-RTO: 0 /100 WBC (ref 0–0.2)
PLATELET # BLD AUTO: 300 10*3/MM3 (ref 140–450)
PMV BLD AUTO: 9.5 FL (ref 6–12)
POTASSIUM SERPL-SCNC: 4.4 MMOL/L (ref 3.5–5.2)
PROCALCITONIN SERPL-MCNC: 0.08 NG/ML (ref 0–0.25)
PROT SERPL-MCNC: 7.4 G/DL (ref 6–8.5)
PROTHROMBIN TIME: 12.9 SECONDS (ref 11.8–14.8)
RBC # BLD AUTO: 4.58 10*6/MM3 (ref 4.14–5.8)
SODIUM SERPL-SCNC: 139 MMOL/L (ref 136–145)
URATE SERPL-MCNC: 11.3 MG/DL (ref 3.4–7)
WBC NRBC COR # BLD: 6.28 10*3/MM3 (ref 3.4–10.8)

## 2023-03-24 PROCEDURE — 80053 COMPREHEN METABOLIC PANEL: CPT | Performed by: NURSE PRACTITIONER

## 2023-03-24 PROCEDURE — 84550 ASSAY OF BLOOD/URIC ACID: CPT | Performed by: NURSE PRACTITIONER

## 2023-03-24 PROCEDURE — 99283 EMERGENCY DEPT VISIT LOW MDM: CPT

## 2023-03-24 PROCEDURE — 85610 PROTHROMBIN TIME: CPT | Performed by: NURSE PRACTITIONER

## 2023-03-24 PROCEDURE — 36415 COLL VENOUS BLD VENIPUNCTURE: CPT

## 2023-03-24 PROCEDURE — 84145 PROCALCITONIN (PCT): CPT | Performed by: NURSE PRACTITIONER

## 2023-03-24 PROCEDURE — 93971 EXTREMITY STUDY: CPT

## 2023-03-24 PROCEDURE — 73630 X-RAY EXAM OF FOOT: CPT

## 2023-03-24 PROCEDURE — 93971 EXTREMITY STUDY: CPT | Performed by: SURGERY

## 2023-03-24 PROCEDURE — 73610 X-RAY EXAM OF ANKLE: CPT

## 2023-03-24 PROCEDURE — 85025 COMPLETE CBC W/AUTO DIFF WBC: CPT | Performed by: NURSE PRACTITIONER

## 2023-03-24 RX ORDER — METHYLPREDNISOLONE 4 MG/1
TABLET ORAL
Qty: 21 EACH | Refills: 0 | Status: SHIPPED | OUTPATIENT
Start: 2023-03-24

## 2023-03-24 RX ORDER — OXYCODONE AND ACETAMINOPHEN 7.5; 325 MG/1; MG/1
1 TABLET ORAL ONCE
Status: COMPLETED | OUTPATIENT
Start: 2023-03-24 | End: 2023-03-24

## 2023-03-24 RX ORDER — OXYCODONE AND ACETAMINOPHEN 7.5; 325 MG/1; MG/1
1 TABLET ORAL EVERY 4 HOURS PRN
Qty: 12 TABLET | Refills: 0 | Status: SHIPPED | OUTPATIENT
Start: 2023-03-24

## 2023-03-24 RX ORDER — DULOXETIN HYDROCHLORIDE 60 MG/1
60 CAPSULE, DELAYED RELEASE ORAL DAILY
COMMUNITY

## 2023-03-24 RX ADMIN — OXYCODONE HYDROCHLORIDE AND ACETAMINOPHEN 1 TABLET: 7.5; 325 TABLET ORAL at 12:01

## 2023-03-24 NOTE — PROGRESS NOTES
Chief Complaint   Patient presents with   • Ankle Pain        HPI     Ricardo Menchaca Jr. is a 57 y.o. male presents for the above problem. He originally felt pain was due to gout though he had no improvement with steroids. Pain progressively worsened. He went to urgent care and was treated with bactrim for concern of cellulitis. He completed 10 day course. Pain and swelling has not improved. He is having difficulty walking on left foot and rates pain an 8/10.          ROS:  Review of Systems   Constitutional: Negative for chills, fatigue and fever.   Musculoskeletal: Positive for arthralgias and myalgias.          reports that he has quit smoking. His smoking use included cigars and cigarettes. He has a 5.00 pack-year smoking history. He has never used smokeless tobacco. He reports that he does not drink alcohol and does not use drugs.    Current Outpatient Medications:   •  amlodipine-olmesartan (PACO) 10-40 MG per tablet, Take 1 tablet by mouth Daily., Disp: 90 tablet, Rfl: 1  •  atorvastatin (LIPITOR) 20 MG tablet, Take 1 tablet by mouth Daily., Disp: 90 tablet, Rfl: 1  •  colchicine 0.6 MG tablet, Take 1 tablet by mouth Every 12 (Twelve) Hours., Disp: , Rfl:   •  Semaglutide, 1 MG/DOSE, (OZEMPIC) 2 MG/1.5ML solution pen-injector, Inject 1 mg under the skin into the appropriate area as directed 1 (One) Time Per Week., Disp: , Rfl:   •  Continuous Blood Gluc Sensor (FreeStyle Jannet Sensor System), Every 10 (Ten) Days., Disp: 1 each, Rfl: 0  •  DULoxetine (CYMBALTA) 60 MG capsule, Take 1 capsule by mouth Daily., Disp: , Rfl:   •  sulfamethoxazole-trimethoprim (BACTRIM DS,SEPTRA DS) 800-160 MG per tablet, Take 1 tablet by mouth 2 (Two) Times a Day for 10 days. (Patient not taking: Reported on 3/24/2023), Disp: 20 tablet, Rfl: 0    OBJECTIVE:  /80 (BP Location: Left arm, Patient Position: Sitting, Cuff Size: Large Adult)   Pulse 86   Temp 98.2 °F (36.8 °C) (Temporal)   SpO2 98%    Physical  Exam  Constitutional:       General: He is not in acute distress.  Cardiovascular:      Rate and Rhythm: Normal rate and regular rhythm.      Heart sounds: Normal heart sounds.   Musculoskeletal:         General: Swelling and tenderness present.        Feet:    Feet:      Left foot:      Skin integrity: No ulcer or warmth.      Comments: Pain localized to achilles tendon, heal, and medial aspect of left foot. Swelling to left ankle and medial aspect of foot. Mild erythema. No open wound or ulcer.         ASSESSMENT/PLAN:     Diagnoses and all orders for this visit:    1. Acute left ankle pain (Primary)  2. Acute foot pain, left  Given severity of pain and history of diabetes, there is concern for cellulitis or osteomyelitis. Differential also includes musculoskeletal injury though he recalls no injury prior to onset of symptoms. Recommended ER as he has failed steroids and oral antibiotics to allow more urgent evaluation with imaging to prevent further complications.     3. Type 2 diabetes mellitus without complication, without long-term current use of insulin (HCC)  Well controlled on current therapy.        An After Visit Summary was printed and given to the patient at discharge.  Return if symptoms worsen or fail to improve.          SUKI Valladares 3/24/2023   Electronically signed.

## 2023-03-24 NOTE — DISCHARGE INSTRUCTIONS
Return to ER if symptoms worsen   Elevate extremity  Moist heat compresses three times a day   Follow up with podiatrist

## 2023-03-24 NOTE — ED PROVIDER NOTES
Subjective   History of Present Illness  Patient is a 57-year-old -American male presents emergency department with left lower extremity pain and swelling for the past several weeks.  He has been seeing Dr. Sanchez and evidently has been given a prescription for antibiotics without relief of symptoms.  Dr. Sanchez's office sent him here for IV antibiotics, blood work, and to make sure that he does not have osteomyelitis.  The patient denies any fever or chills.  He states that the swelling started to the lateral aspect of the left foot and ankle about a month ago.  He states he has pain in his heel as well.  He denies any history of DVT.  He denies any chest pain or shortness of breath.  No recent travel.  He does have a history of gout.  He has not had lab work to see if gout was causing his pain of this extremity.  He denies any known injury.  He states he basically just woke up this 1 day with pain.  He denies any cough or congestion.  No fever or chills.    History provided by:  Patient   used: No        Review of Systems   Constitutional: Negative.    HENT: Negative.    Eyes: Negative.    Respiratory: Negative.    Cardiovascular: Negative.    Gastrointestinal: Negative.    Endocrine: Negative.    Genitourinary: Negative.    Musculoskeletal:        Patient is a 57-year-old -American male presents emergency department with left lower extremity pain and swelling for the past several weeks.  He has been seeing Dr. Sanchez and evidently has been given a prescription for antibiotics without relief of symptoms.  Dr. Sancehz's office sent him here for IV antibiotics, blood work, and to make sure that he does not have osteomyelitis.  The patient denies any fever or chills.  He states that the swelling started to the lateral aspect of the left foot and ankle about a month ago.  He states he has pain in his heel as well.  He denies any history of DVT.  He denies any chest pain or shortness of  breath.  No recent travel.  He does have a history of gout.  He has not had lab work to see if gout was causing his pain of this extremity.  He denies any known injury.  He states he basically just woke up this 1 day with pain.  He denies any cough or congestion.  No fever or chills.   Skin: Negative.    Allergic/Immunologic: Negative.    Neurological: Negative.    Hematological: Negative.    Psychiatric/Behavioral: Negative.    All other systems reviewed and are negative.      Past Medical History:   Diagnosis Date   • Anxiety    • Arthritis    • Depression    • Diabetes mellitus (HCC)    • Gout    • Hypertension    • Obese        Allergies   Allergen Reactions   • Lortab [Hydrocodone-Acetaminophen] Angioedema   • Penicillins Anaphylaxis   • Shrimp Angioedema       Past Surgical History:   Procedure Laterality Date   • CARDIAC CATHETERIZATION  2015    Left Heart   • COLONOSCOPY N/A 10/19/2021    Procedure: COLONOSCOPY WITH ANESTHESIA;  Surgeon: Franklin Sharma MD;  Location: [facility] ENDOSCOPY;  Service: Gastroenterology;  Laterality: N/A;  pre op: screening  post op: diverticulosis, poor prep  PCP: none   • COLONOSCOPY W/ BIOPSIES  08/10/2005    Mild active colitis with significant increase in eosinophils, small hemorrhoids    • ENDOSCOPY  2005    Large HH, negative for celiac disease   • SPINAL FUSION         Family History   Problem Relation Age of Onset   • Heart disease Mother    • Diabetes Mother         She    • Hypertension Mother    • Rheum arthritis Father    • Cancer Father    • Diabetes Father         He    • Hypertension Father    • Rheum arthritis Sister        Social History     Socioeconomic History   • Marital status:    Tobacco Use   • Smoking status: Former     Packs/day: 1.00     Years: 5.00     Pack years: 5.00     Types: Cigars, Cigarettes   • Smokeless tobacco: Never   • Tobacco comments:     I stopped smoking 20 years ago   Vaping Use   • Vaping Use: Never used  "  Substance and Sexual Activity   • Alcohol use: No   • Drug use: No   • Sexual activity: Yes       Prior to Admission medications    Medication Sig Start Date End Date Taking? Authorizing Provider   amlodipine-olmesartan (PACO) 10-40 MG per tablet Take 1 tablet by mouth Daily. 2/6/23   Aicha Eldridge APRN   atorvastatin (LIPITOR) 20 MG tablet Take 1 tablet by mouth Daily. 1/14/22   Arturo Barahona,    colchicine 0.6 MG tablet Take 1 tablet by mouth Every 12 (Twelve) Hours. 1/8/23   Kelsi Garcia MD   DULoxetine (CYMBALTA) 60 MG capsule Take 1 capsule by mouth Daily.    Kelsi Garcia MD   Semaglutide, 1 MG/DOSE, (OZEMPIC) 2 MG/1.5ML solution pen-injector Inject 1 mg under the skin into the appropriate area as directed 1 (One) Time Per Week.    Kelsi Garcia MD   sulfamethoxazole-trimethoprim (BACTRIM DS,SEPTRA DS) 800-160 MG per tablet Take 1 tablet by mouth 2 (Two) Times a Day for 10 days.  Patient not taking: Reported on 3/24/2023 3/14/23 3/24/23  Meredith Casarez APRN   Continuous Blood Gluc Sensor (Creative Alliesyle Jannet Sensor System) Every 10 (Ten) Days. 2/6/23 3/24/23  Aicha Eldridge APRN       /78 (BP Location: Right arm, Patient Position: Sitting)   Pulse 79   Temp 97.3 °F (36.3 °C) (Oral)   Resp 18   Ht 180.3 cm (71\")   Wt (!) 144 kg (318 lb)   SpO2 95%   BMI 44.35 kg/m²     Objective   Physical Exam  Vitals and nursing note reviewed.   Constitutional:       Appearance: He is well-developed.      Comments: Nontoxic-appearing no acute distress.   HENT:      Head: Normocephalic and atraumatic.   Eyes:      Conjunctiva/sclera: Conjunctivae normal.      Pupils: Pupils are equal, round, and reactive to light.   Cardiovascular:      Rate and Rhythm: Normal rate and regular rhythm.      Heart sounds: Normal heart sounds.   Pulmonary:      Effort: Pulmonary effort is normal.      Breath sounds: Normal breath sounds.   Abdominal:      General: Bowel sounds are " normal.      Palpations: Abdomen is soft.   Musculoskeletal:      Cervical back: Normal range of motion and neck supple.      Comments: There is mild soft tissue swelling noted left lower extremity.  There are some tenderness on palpation of the left calf and lateral aspect of the ankle.  Pedal pulses are palpable.  There is no erythema noted.  No signs of cellulitis.   Skin:     General: Skin is warm and dry.   Neurological:      Mental Status: He is alert and oriented to person, place, and time.      Deep Tendon Reflexes: Reflexes are normal and symmetric.   Psychiatric:         Behavior: Behavior normal.         Thought Content: Thought content normal.         Judgment: Judgment normal.         Procedures         Lab Results (last 24 hours)     Procedure Component Value Units Date/Time    CBC & Differential [[id number]]  (Abnormal) Collected: 03/24/23 1007    Specimen: Blood Updated: 03/24/23 1014    Narrative:      The following orders were created for panel order CBC & Differential.  Procedure                               Abnormality         Status                     ---------                               -----------         ------                     CBC Auto Differential[[id number]]        Abnormal            Final result                 Please view results for these tests on the individual orders.    Comprehensive Metabolic Panel [[id number]]  (Abnormal) Collected: 03/24/23 1007    Specimen: Blood Updated: 03/24/23 1037     Glucose 99 mg/dL      BUN 28 mg/dL      Creatinine 1.37 mg/dL      Sodium 139 mmol/L      Potassium 4.4 mmol/L      Chloride 104 mmol/L      CO2 24.0 mmol/L      Calcium 9.1 mg/dL      Total Protein 7.4 g/dL      Albumin 4.4 g/dL      ALT (SGPT) 32 U/L      AST (SGOT) 41 U/L      Alkaline Phosphatase 117 U/L      Total Bilirubin 0.2 mg/dL      Globulin 3.0 gm/dL      A/G Ratio 1.5 g/dL      BUN/Creatinine Ratio 20.4     Anion Gap 11.0 mmol/L      eGFR 60.9 mL/min/1.73     Narrative:       "GFR Normal >60  Chronic Kidney Disease <60  Kidney Failure <15      Protime-INR [[id number]]  (Normal) Collected: 03/24/23 1007    Specimen: Blood Updated: 03/24/23 1023     Protime 12.9 Seconds      INR 0.96    Procalcitonin [[id number]]  (Normal) Collected: 03/24/23 1007    Specimen: Blood Updated: 03/24/23 1043     Procalcitonin 0.08 ng/mL     Narrative:      As a Marker for Sepsis (Non-Neonates):    1. <0.5 ng/mL represents a low risk of severe sepsis and/or septic shock.  2. >2 ng/mL represents a high risk of severe sepsis and/or septic shock.    As a Marker for Lower Respiratory Tract Infections that require antibiotic therapy:    PCT on Admission    Antibiotic Therapy       6-12 Hrs later    >0.5                Strongly Recommended  >0.25 - <0.5        Recommended   0.1 - 0.25          Discouraged              Remeasure/reassess PCT  <0.1                Strongly Discouraged     Remeasure/reassess PCT    As 28 day mortality risk marker: \"Change in Procalcitonin Result\" (>80% or <=80%) if Day 0 (or Day 1) and Day 4 values are available. Refer to http://www.[facility]pct[facility]cal[place].com    Change in PCT <=80%  A decrease of PCT levels below or equal to 80% defines a positive change in PCT test result representing a higher risk for 28-day all-cause mortality of patients diagnosed with severe sepsis for septic shock.    Change in PCT >80%  A decrease of PCT levels of more than 80% defines a negative change in PCT result representing a lower risk for 28-day all-cause mortality of patients diagnosed with severe sepsis or septic shock.       Uric Acid [[id number]]  (Abnormal) Collected: 03/24/23 1007    Specimen: Blood Updated: 03/24/23 1038     Uric Acid 11.3 mg/dL     CBC Auto Differential [[id number]]  (Abnormal) Collected: 03/24/23 1007    Specimen: Blood Updated: 03/24/23 1014     WBC 6.28 10*3/mm3      RBC 4.58 10*6/mm3      Hemoglobin 12.6 g/dL      Hematocrit 40.8 %      MCV 89.1 fL      MCH 27.5 pg      MCHC 30.9 " g/dL      RDW 15.2 %      RDW-SD 49.4 fl      MPV 9.5 fL      Platelets 300 10*3/mm3      Neutrophil % 31.5 %      Lymphocyte % 52.9 %      Monocyte % 11.6 %      Eosinophil % 3.2 %      Basophil % 0.6 %      Immature Grans % 0.2 %      Neutrophils, Absolute 1.98 10*3/mm3      Lymphocytes, Absolute 3.32 10*3/mm3      Monocytes, Absolute 0.73 10*3/mm3      Eosinophils, Absolute 0.20 10*3/mm3      Basophils, Absolute 0.04 10*3/mm3      Immature Grans, Absolute 0.01 10*3/mm3      nRBC 0.0 /100 WBC           XR Foot 3+ View Left   Final Result   1. No acute osseous injury.   2. Osteoarthritis changes most notably in the midfoot and hindfoot,   especially subtalar joint.   This report was finalized on 03/24/2023 11:08 by Dr Jag Acosta, .      XR Ankle 3+ View Left   Final Result   Deformity of the medial and lateral malleoli most consistent   with previous trauma and fractures. No acute fracture is identified.   There is lateral soft tissue swelling. A prominent 1.6 cm os trigonum is   noted posterior to the talus. There is also anterior spurring at the   distal tibiotalar joint.   This report was finalized on 03/24/2023 10:56 by Dr. Cody Stewart MD.      US Venous Doppler Lower Extremity Left (duplex)    (Results Pending)       ED Course  ED Course as of 03/24/23 1153   Fri Mar 24, 2023   1141 No dvt noted. No acute fx- moderate degenerative changes from previous injury.  Uric acid is elevated at 11.3.  Patient has been taking colchicine. Will place on steroids. Advised to monitor blood sugar while taking steroids.  Advised to follow-up with podiatrist as well overall the arthritic changes in his foot.  We will give patient a pain pill at this time as well.  Advised no DVT noted on vascular study.  Reviewed results of testing with the patient. [CW]   1145   He will be discharged shortly in stable condition [CW]      ED Course User Index  [CW] Ana Maria Pinto APRN        Medical Decision Making  Patient is a  57-year-old -American male presents emergency department with left lower extremity pain and swelling for the past several weeks.  He has been seeing Dr. Sanchez and evidently has been given a prescription for antibiotics without relief of symptoms.  Dr. Sanchez's office sent him here for IV antibiotics, blood work, and to make sure that he does not have osteomyelitis.  The patient denies any fever or chills.  He states that the swelling started to the lateral aspect of the left foot and ankle about a month ago.  He states he has pain in his heel as well.  He denies any history of DVT.  He denies any chest pain or shortness of breath.  No recent travel.  He does have a history of gout.  He has not had lab work to see if gout was causing his pain of this extremity.  He denies any known injury.  He states he basically just woke up this 1 day with pain.  He denies any cough or congestion.  No fever or chills.  Course of treatment in the er: XR Foot 3+ View Left   Final Result    1. No acute osseous injury.    2. Osteoarthritis changes most notably in the midfoot and hindfoot,    especially subtalar joint.    This report was finalized on 03/24/2023 11:08 by Dr Jag Acosta, .     XR Ankle 3+ View Left   Final Result    Deformity of the medial and lateral malleoli most consistent    with previous trauma and fractures. No acute fracture is identified.    There is lateral soft tissue swelling. A prominent 1.6 cm os trigonum is    noted posterior to the talus. There is also anterior spurring at the    distal tibiotalar joint.    This report was finalized on 03/24/2023 10:56 by Dr. Cody Stewart MD.     US Venous Doppler Lower Extremity Left (duplex)    (Results Pending)  Labs Reviewed  COMPREHENSIVE METABOLIC PANEL - Abnormal; Notable for the following components:     BUN                           28 (*)                 Creatinine                    1.37 (*)               AST (SGOT)                    41 (*)             "  All other components within normal limits         Narrative: GFR Normal >60                  Chronic Kidney Disease <60                  Kidney Failure <15                    URIC ACID - Abnormal; Notable for the following components:     Uric Acid                     11.3 (*)            All other components within normal limits  CBC WITH AUTO DIFFERENTIAL - Abnormal; Notable for the following components:     Hemoglobin                    12.6 (*)               MCHC                          30.9 (*)               Neutrophil %                  31.5 (*)               Lymphocyte %                  52.9 (*)               Lymphocytes, Absolute         3.32 (*)            All other components within normal limits  PROTIME-INR - Normal  PROCALCITONIN - Normal         Narrative: As a Marker for Sepsis (Non-Neonates):                                    1. <0.5 ng/mL represents a low risk of severe sepsis and/or septic shock.                  2. >2 ng/mL represents a high risk of severe sepsis and/or septic shock.                                    As a Marker for Lower Respiratory Tract Infections that require antibiotic therapy:                                    PCT on Admission    Antibiotic Therapy       6-12 Hrs later                                    >0.5                Strongly Recommended                  >0.25 - <0.5        Recommended                   0.1 - 0.25          Discouraged              Remeasure/reassess PCT                  <0.1                Strongly Discouraged     Remeasure/reassess PCT                                    As 28 day mortality risk marker: \"Change in Procalcitonin Result\" (>80% or <=80%) if Day 0 (or Day 1) and Day 4 values are available. Refer to http://www.[facility]-pct-calculator.com                                    Change in PCT <=80%                  A decrease of PCT levels below or equal to 80% defines a positive change in PCT test result representing a higher risk for 28-day " all-cause mortality of patients diagnosed with severe sepsis for septic shock.                                    Change in PCT >80%                  A decrease of PCT levels of more than 80% defines a negative change in PCT result representing a lower risk for 28-day all-cause mortality of patients diagnosed with severe sepsis or septic shock.                     CBC AND DIFFERENTIAL  Advised the patient that his vascular studies was negative for acute DVT.  He did have moderate arthritis on his x-rays.  He denies any known previous trauma.  I placed patient on a steroid Dosepak and pain medication.  Advised him to monitor his blood sugar while he is on steroids.  Advised to elevate and apply moist heat compresses 3 times daily advised to need to follow-up with a podiatrist as well.  Patient be discharged shortly in stable condition.  Differential dx: gouty arthritis; dvt; acute fx    Gouty arthritis: acute illness or injury  Left leg pain: acute illness or injury  Amount and/or Complexity of Data Reviewed  Labs: ordered.  Radiology: ordered.  ECG/medicine tests: ordered.      Risk  Prescription drug management.           Final diagnoses:   Gouty arthritis   Left leg pain          Ana Maria Pinto, APRN  03/24/23 1153

## 2023-05-05 ENCOUNTER — LAB (OUTPATIENT)
Dept: LAB | Facility: HOSPITAL | Age: 58
End: 2023-05-05
Payer: MEDICARE

## 2023-05-05 ENCOUNTER — OFFICE VISIT (OUTPATIENT)
Dept: INTERNAL MEDICINE | Facility: CLINIC | Age: 58
End: 2023-05-05
Payer: MEDICARE

## 2023-05-05 VITALS
BODY MASS INDEX: 40.94 KG/M2 | TEMPERATURE: 98 F | HEART RATE: 89 BPM | HEIGHT: 71 IN | OXYGEN SATURATION: 98 % | SYSTOLIC BLOOD PRESSURE: 158 MMHG | DIASTOLIC BLOOD PRESSURE: 100 MMHG | RESPIRATION RATE: 16 BRPM | WEIGHT: 292.4 LBS

## 2023-05-05 DIAGNOSIS — M1A.09X0 IDIOPATHIC CHRONIC GOUT OF MULTIPLE SITES WITHOUT TOPHUS: ICD-10-CM

## 2023-05-05 DIAGNOSIS — E66.01 CLASS 3 SEVERE OBESITY DUE TO EXCESS CALORIES WITH SERIOUS COMORBIDITY AND BODY MASS INDEX (BMI) OF 40.0 TO 44.9 IN ADULT: ICD-10-CM

## 2023-05-05 DIAGNOSIS — E11.9 TYPE 2 DIABETES MELLITUS WITHOUT COMPLICATION, WITHOUT LONG-TERM CURRENT USE OF INSULIN: ICD-10-CM

## 2023-05-05 DIAGNOSIS — I10 PRIMARY HYPERTENSION: Primary | ICD-10-CM

## 2023-05-05 DIAGNOSIS — I10 PRIMARY HYPERTENSION: ICD-10-CM

## 2023-05-05 DIAGNOSIS — E78.2 MIXED HYPERLIPIDEMIA: ICD-10-CM

## 2023-05-05 LAB
ALBUMIN SERPL-MCNC: 4.3 G/DL (ref 3.5–5.2)
ALBUMIN/GLOB SERPL: 1.3 G/DL
ALP SERPL-CCNC: 116 U/L (ref 39–117)
ALT SERPL W P-5'-P-CCNC: 41 U/L (ref 1–41)
ANION GAP SERPL CALCULATED.3IONS-SCNC: 8.6 MMOL/L (ref 5–15)
AST SERPL-CCNC: 41 U/L (ref 1–40)
BILIRUB SERPL-MCNC: 0.2 MG/DL (ref 0–1.2)
BUN SERPL-MCNC: 27 MG/DL (ref 6–20)
BUN/CREAT SERPL: 16.2 (ref 7–25)
CALCIUM SPEC-SCNC: 10 MG/DL (ref 8.6–10.5)
CHLORIDE SERPL-SCNC: 105 MMOL/L (ref 98–107)
CHOLEST SERPL-MCNC: 151 MG/DL (ref 0–200)
CO2 SERPL-SCNC: 23.4 MMOL/L (ref 22–29)
CREAT SERPL-MCNC: 1.67 MG/DL (ref 0.76–1.27)
DEPRECATED RDW RBC AUTO: 45.3 FL (ref 37–54)
EGFRCR SERPLBLD CKD-EPI 2021: 48 ML/MIN/1.73
ERYTHROCYTE [DISTWIDTH] IN BLOOD BY AUTOMATED COUNT: 15.1 % (ref 12.3–15.4)
ERYTHROCYTE [SEDIMENTATION RATE] IN BLOOD: 34 MM/HR (ref 0–20)
GLOBULIN UR ELPH-MCNC: 3.3 GM/DL
GLUCOSE SERPL-MCNC: 99 MG/DL (ref 65–99)
HBA1C MFR BLD: 5.8 % (ref 4.8–5.6)
HCT VFR BLD AUTO: 40.9 % (ref 37.5–51)
HDLC SERPL-MCNC: 52 MG/DL (ref 40–60)
HGB BLD-MCNC: 14.1 G/DL (ref 13–17.7)
LDLC SERPL CALC-MCNC: 85 MG/DL (ref 0–100)
LDLC/HDLC SERPL: 1.64 {RATIO}
MCH RBC QN AUTO: 28.8 PG (ref 26.6–33)
MCHC RBC AUTO-ENTMCNC: 34.5 G/DL (ref 31.5–35.7)
MCV RBC AUTO: 83.5 FL (ref 79–97)
PLATELET # BLD AUTO: 292 10*3/MM3 (ref 140–450)
PMV BLD AUTO: 9.7 FL (ref 6–12)
POTASSIUM SERPL-SCNC: 4.7 MMOL/L (ref 3.5–5.2)
PROT SERPL-MCNC: 7.6 G/DL (ref 6–8.5)
RBC # BLD AUTO: 4.9 10*6/MM3 (ref 4.14–5.8)
SODIUM SERPL-SCNC: 137 MMOL/L (ref 136–145)
TRIGL SERPL-MCNC: 69 MG/DL (ref 0–150)
URATE SERPL-MCNC: 7.8 MG/DL (ref 3.4–7)
VLDLC SERPL-MCNC: 14 MG/DL (ref 5–40)
WBC NRBC COR # BLD: 6.53 10*3/MM3 (ref 3.4–10.8)

## 2023-05-05 PROCEDURE — 85027 COMPLETE CBC AUTOMATED: CPT

## 2023-05-05 PROCEDURE — 80053 COMPREHEN METABOLIC PANEL: CPT

## 2023-05-05 PROCEDURE — 85652 RBC SED RATE AUTOMATED: CPT

## 2023-05-05 PROCEDURE — 36415 COLL VENOUS BLD VENIPUNCTURE: CPT

## 2023-05-05 PROCEDURE — 84550 ASSAY OF BLOOD/URIC ACID: CPT

## 2023-05-05 PROCEDURE — 80061 LIPID PANEL: CPT

## 2023-05-05 PROCEDURE — 83036 HEMOGLOBIN GLYCOSYLATED A1C: CPT

## 2023-05-05 RX ORDER — FEBUXOSTAT 80 MG/1
80 TABLET, FILM COATED ORAL DAILY
COMMUNITY

## 2023-05-05 RX ORDER — MELOXICAM 15 MG/1
1 TABLET ORAL DAILY
COMMUNITY
Start: 2023-04-24

## 2023-05-05 RX ORDER — DOXAZOSIN 2 MG/1
2 TABLET ORAL NIGHTLY
Qty: 90 TABLET | Refills: 1 | Status: SHIPPED | OUTPATIENT
Start: 2023-05-05

## 2023-05-05 NOTE — PROGRESS NOTES
"CC: f/u diabetes    History:  Ricardo Menchaca Jr. is a 57 y.o. male   He notes he has been doing reasonably well without any acute illness.  He notes no symptoms of hyperglycemia and has continued without side effects on Ozempic.  His gout has been [facility]  Blood pressure is running high, which he notes it has been doing over the last 10 days or so, but he attributes this to pain in his ankle, for which she is seeing orthopedics.      ROS:  Review of Systems   Respiratory: Negative for shortness of breath.    Cardiovascular: Negative for chest pain.   Musculoskeletal: Positive for arthralgias and gait problem.        reports that he has quit smoking. His smoking use included cigars and cigarettes. He has a 5.00 pack-year smoking history. He has never used smokeless tobacco. He reports that he does not drink alcohol and does not use drugs.      Current Outpatient Medications:   •  amlodipine-olmesartan (PACO) 10-40 MG per tablet, Take 1 tablet by mouth Daily., Disp: 90 tablet, Rfl: 1  •  atorvastatin (LIPITOR) 20 MG tablet, Take 1 tablet by mouth Daily., Disp: 90 tablet, Rfl: 1  •  colchicine 0.6 MG tablet, Take 1 tablet by mouth Every 12 (Twelve) Hours., Disp: , Rfl:   •  DULoxetine (CYMBALTA) 60 MG capsule, Take 1 capsule by mouth Daily., Disp: , Rfl:   •  febuxostat (ULORIC) 80 MG tablet tablet, Take 1 tablet by mouth Daily., Disp: , Rfl:   •  meloxicam (MOBIC) 15 MG tablet, Take 1 tablet by mouth Daily., Disp: , Rfl:   •  Semaglutide, 1 MG/DOSE, (OZEMPIC) 2 MG/1.5ML solution pen-injector, Inject 1 mg under the skin into the appropriate area as directed 1 (One) Time Per Week., Disp: , Rfl:     OBJECTIVE:  /100 (BP Location: Left arm, Patient Position: Sitting, Cuff Size: Adult)   Pulse 89   Temp 98 °F (36.7 °C)   Resp 16   Ht 180.3 cm (71\")   Wt 133 kg (292 lb 6.4 oz)   SpO2 98%   BMI 40.78 kg/m²    Physical Exam  Constitutional:       General: He is not " in acute distress.  Cardiovascular:      Rate and Rhythm: Normal rate and regular rhythm.      Heart sounds: Normal heart sounds. No murmur heard.  Pulmonary:      Effort: Pulmonary effort is normal.      Breath sounds: Normal breath sounds. No wheezing.   Neurological:      Mental Status: He is alert and oriented to person, place, and time.      Gait: Gait normal.   Psychiatric:         Mood and Affect: Mood normal.         Behavior: Behavior normal.         Assessment/Plan     Diagnoses and all orders for this visit:    1. Primary hypertension (Primary)  -     Comprehensive Metabolic Panel; Future  -     CBC (No Diff); Future  Poorly controlled, BP goal for age is <140/90 per JNC 8 guidelines and add HCTZ.    2. Mixed hyperlipidemia  -     Lipid Panel; Future  Stable on moderate intensity statin therapy per ACC/AHA guidelines.    3. Type 2 diabetes mellitus without complication, without long-term current use of insulin  -     Comprehensive Metabolic Panel; Future  -     Hemoglobin A1c; Future  -     Lipid Panel; Future  -     Ambulatory Referral for Diabetic Eye Exam-Optometry  Labs for monitoring, eye exam for surveillance and continue Ozempic unless labs indicate other management.     4. Idiopathic chronic gout of multiple sites without tophus  -     Uric Acid; Future  -     Sedimentation Rate; Future  Check uric acid and ESR to monitor. Goal uric acid <6.     5. Class 3 severe obesity due to excess calories with serious comorbidity and body mass index (BMI) of 40.0 to 44.9 in adult (HCC)  Class 3 Severe Obesity (BMI >=40). Obesity-related health conditions include the following: hypertension, diabetes mellitus and dyslipidemias. Obesity is improving with lifestyle modifications. BMI is is above average; BMI management plan is completed. We discussed portion control and increasing exercise    An After Visit Summary was printed and given to the patient at discharge.  Return in about 6 months (around 11/5/2023)  for Medicare Wellness.      Arturo Barahona D.O. 5/5/2023   Electronically signed.

## 2023-05-05 NOTE — PROGRESS NOTES
CC:    History:  Ricardo Menchaca Jr. is a 57 y.o. male who presents today for evaluation of the above problems.    {Problem List  Visit Diagnosis   Encounters  Notes  Medications  Labs  Result Review Imaging  Media :23}  ROS:  Review of Systems    Allergies   Allergen Reactions   • Lortab [Hydrocodone-Acetaminophen] Angioedema   • Penicillins Anaphylaxis   • Shrimp Angioedema     Past Medical History:   Diagnosis Date   • Anxiety    • Arthritis    • Depression    • Diabetes mellitus    • Gout    • Hypertension    • Obese      Past Surgical History:   Procedure Laterality Date   • CARDIAC CATHETERIZATION  2015    Left Heart   • COLONOSCOPY N/A 10/19/2021    Procedure: COLONOSCOPY WITH ANESTHESIA;  Surgeon: Franklin Sharma MD;  Location: [facility] ENDOSCOPY;  Service: Gastroenterology;  Laterality: N/A;  pre op: screening  post op: diverticulosis, poor prep  PCP: none   • COLONOSCOPY W/ BIOPSIES  08/10/2005    Mild active colitis with significant increase in eosinophils, small hemorrhoids    • ENDOSCOPY  2005    Large HH, negative for celiac disease   • SPINAL FUSION       Family History   Problem Relation Age of Onset   • Heart disease Mother    • Diabetes Mother         She    • Hypertension Mother    • Rheum arthritis Father    • Cancer Father    • Diabetes Father         He    • Hypertension Father    • Rheum arthritis Sister       reports that he has quit smoking. His smoking use included cigars and cigarettes. He has a 5.00 pack-year smoking history. He has never used smokeless tobacco. He reports that he does not drink alcohol and does not use drugs.      Current Outpatient Medications:   •  amlodipine-olmesartan (PACO) 10-40 MG per tablet, Take 1 tablet by mouth Daily., Disp: 90 tablet, Rfl: 1  •  atorvastatin (LIPITOR) 20 MG tablet, Take 1 tablet by mouth Daily., Disp: 90 tablet, Rfl: 1  •  colchicine 0.6 MG tablet, Take 1 tablet by mouth Every 12 (Twelve) Hours., Disp: , Rfl:  "  •  DULoxetine (CYMBALTA) 60 MG capsule, Take 1 capsule by mouth Daily., Disp: , Rfl:   •  meloxicam (MOBIC) 15 MG tablet, Take 1 tablet by mouth Daily., Disp: , Rfl:   •  Semaglutide, 1 MG/DOSE, (OZEMPIC) 2 MG/1.5ML solution pen-injector, Inject 1 mg under the skin into the appropriate area as directed 1 (One) Time Per Week., Disp: , Rfl:     OBJECTIVE:  /100 (BP Location: Left arm, Patient Position: Sitting, Cuff Size: Adult)   Pulse 89   Temp 98 °F (36.7 °C)   Resp 16   Ht 180.3 cm (71\")   Wt 133 kg (292 lb 6.4 oz)   SpO2 98%   BMI 40.78 kg/m²    Physical Exam  {Labs  Result Review  Imaging  Med Tab  Media  Procedures  :23}  Assessment/Plan  {CC Problem List  Visit Diagnosis   ROS  Review (Popup)  Health Maintenance  Quality  BestPractice  Medications  SmartSets  SnapShot Encounters  Media :23}  Diagnoses and all orders for this visit:    1. Controlled type 2 diabetes mellitus without complication, without long-term current use of insulin    2. Mixed hyperlipidemia      {Instructions Charge Capture  Follow-up Communications :23}  An After Visit Summary was printed and given to the patient at discharge.  No follow-ups on file.         Arturo Barahona D.O. 5/5/2023   Electronically signed.  "

## 2023-05-10 DIAGNOSIS — M1A.09X0 IDIOPATHIC CHRONIC GOUT OF MULTIPLE SITES WITHOUT TOPHUS: Primary | ICD-10-CM

## 2023-05-18 ENCOUNTER — TELEPHONE (OUTPATIENT)
Dept: INTERNAL MEDICINE | Facility: CLINIC | Age: 58
End: 2023-05-18
Payer: MEDICARE

## 2023-05-18 NOTE — TELEPHONE ENCOUNTER
Spoke with patient he states that his bp is staying around 160 114 he is having diarrhea & throwing up, patient was advised to go to the ER  
Clarita Gutierrez(PA)

## 2023-09-19 DIAGNOSIS — I10 ESSENTIAL HYPERTENSION: ICD-10-CM

## 2023-09-19 RX ORDER — AMLODIPINE AND OLMESARTAN MEDOXOMIL 10; 40 MG/1; MG/1
1 TABLET ORAL DAILY
Qty: 90 TABLET | Refills: 0 | Status: SHIPPED | OUTPATIENT
Start: 2023-09-19

## 2023-09-19 NOTE — TELEPHONE ENCOUNTER
Rx Refill Note  Requested Prescriptions     Pending Prescriptions Disp Refills    amlodipine-olmesartan (PACO) 10-40 MG per tablet [Pharmacy Med Name: amLODIPine-Olmesartan 10-40 MG Oral Tablet] 90 tablet 0     Sig: Take 1 tablet by mouth once daily      Last office visit with prescribing clinician: 3/24/2023   Last telemedicine visit with prescribing clinician: Visit date not found   Next office visit with prescribing clinician: Visit date not found                         Would you like a call back once the refill request has been completed: [] Yes [] No    If the office needs to give you a call back, can they leave a voicemail: [] Yes [] No    Lucas Ledezma MA  09/19/23, 11:59 CDT

## 2023-09-20 ENCOUNTER — OFFICE VISIT (OUTPATIENT)
Dept: INTERNAL MEDICINE | Facility: CLINIC | Age: 58
End: 2023-09-20
Payer: MEDICARE

## 2023-09-20 VITALS
SYSTOLIC BLOOD PRESSURE: 144 MMHG | HEIGHT: 71 IN | OXYGEN SATURATION: 97 % | BODY MASS INDEX: 39.76 KG/M2 | HEART RATE: 90 BPM | WEIGHT: 284 LBS | DIASTOLIC BLOOD PRESSURE: 90 MMHG

## 2023-09-20 DIAGNOSIS — I10 PRIMARY HYPERTENSION: ICD-10-CM

## 2023-09-20 DIAGNOSIS — E78.2 MIXED HYPERLIPIDEMIA: ICD-10-CM

## 2023-09-20 DIAGNOSIS — E11.9 TYPE 2 DIABETES MELLITUS WITHOUT COMPLICATION, WITHOUT LONG-TERM CURRENT USE OF INSULIN: ICD-10-CM

## 2023-09-20 DIAGNOSIS — E66.01 CLASS 3 SEVERE OBESITY DUE TO EXCESS CALORIES WITH SERIOUS COMORBIDITY AND BODY MASS INDEX (BMI) OF 40.0 TO 44.9 IN ADULT: ICD-10-CM

## 2023-09-20 DIAGNOSIS — Z12.5 SCREENING FOR PROSTATE CANCER: ICD-10-CM

## 2023-09-20 DIAGNOSIS — Z00.00 ENCOUNTER FOR SUBSEQUENT ANNUAL WELLNESS VISIT (AWV) IN MEDICARE PATIENT: Primary | ICD-10-CM

## 2023-09-20 DIAGNOSIS — R79.89 ELEVATED SERUM CREATININE: ICD-10-CM

## 2023-09-20 RX ORDER — DOXAZOSIN MESYLATE 4 MG/1
4 TABLET ORAL NIGHTLY
Qty: 90 TABLET | Refills: 3 | Status: SHIPPED | OUTPATIENT
Start: 2023-09-20

## 2023-09-20 RX ORDER — AMITRIPTYLINE HYDROCHLORIDE 10 MG/1
10 TABLET, FILM COATED ORAL NIGHTLY
COMMUNITY

## 2023-09-20 NOTE — PROGRESS NOTES
The ABCs of the Annual Wellness Visit  Subsequent Medicare Wellness Visit    Subjective    Ricardo Menchaca Jr. is a 57 y.o. male who presents for a Subsequent Medicare Wellness Visit.    The following portions of the patient's history were reviewed and   updated as appropriate: allergies, current medications, past family history, past medical history, past social history, past surgical history, and problem list.    Compared to one year ago, the patient feels his physical   health is the same.    Compared to one year ago, the patient feels his mental   health is the same.    Recent Hospitalizations:  He was not admitted to the hospital during the last year.       Current Medical Providers:  Patient Care Team:  Arturo Barahona DO as PCP - General (Internal Medicine)  Chente Elaine MD as Consulting Physician (Cardiology)  Keyla Shea RN as Ambulatory  ([facility])    Outpatient Medications Prior to Visit   Medication Sig Dispense Refill    amitriptyline (ELAVIL) 10 MG tablet Take 1 tablet by mouth Every Night.      amlodipine-olmesartan (PACO) 10-40 MG per tablet Take 1 tablet by mouth Daily. 90 tablet 0    atorvastatin (LIPITOR) 20 MG tablet Take 1 tablet by mouth Daily. 90 tablet 1    colchicine 0.6 MG tablet Take 1 tablet by mouth Every 12 (Twelve) Hours.      Diclofenac Sodium (VOLTAREN) 1 % gel gel Apply  topically to the appropriate area as directed 4 (Four) Times a Day. 400 g 3    DULoxetine (CYMBALTA) 60 MG capsule Take 1 capsule by mouth Daily.      febuxostat (ULORIC) 80 MG tablet tablet Take 1 tablet by mouth Daily.      Semaglutide, 1 MG/DOSE, (OZEMPIC) 2 MG/1.5ML solution pen-injector Inject 1 mg under the skin into the appropriate area as directed 1 (One) Time Per Week.      doxazosin (Cardura) 2 MG tablet Take 1 tablet by mouth Every Night. 90 tablet 1    meloxicam (MOBIC) 15 MG tablet Take 1 tablet by mouth Daily.       No facility-administered medications prior  "to visit.       No opioid medication identified on active medication list. I have reviewed chart for other potential  high risk medication/s and harmful drug interactions in the elderly.        Aspirin is not on active medication list.  Aspirin use is not indicated based on review of current medical condition/s. Risk of harm outweighs potential benefits.  .    Patient Active Problem List   Diagnosis    Cervical radiculopathy    Class 3 severe obesity due to excess calories with serious comorbidity and body mass index (BMI) of 40.0 to 44.9 in adult    Former tobacco use    Type 2 diabetes mellitus without complication, without long-term current use of insulin    Primary hypertension    Idiopathic chronic gout of multiple sites without tophus    Lumbosacral spondylosis without myelopathy    Spinal stenosis of lumbar region without neurogenic claudication     Advance Care Planning   Advance Care Planning     Advance Directive is not on file.  ACP discussion was held with the patient during this visit. Patient does not have an advance directive, information provided.     Objective    Vitals:    09/20/23 0854   BP: 144/90   BP Location: Right arm   Patient Position: Sitting   Cuff Size: Adult   Pulse: 90   SpO2: 97%   Weight: 129 kg (284 lb)   Height: 180.3 cm (71\")     Estimated body mass index is 39.61 kg/m² as calculated from the following:    Height as of this encounter: 180.3 cm (71\").    Weight as of this encounter: 129 kg (284 lb).           Does the patient have evidence of cognitive impairment? No          HEALTH RISK ASSESSMENT    Smoking Status:  Social History     Tobacco Use   Smoking Status Former    Packs/day: 1.00    Years: 5.00    Pack years: 5.00    Types: Cigarettes, Cigars   Smokeless Tobacco Never   Tobacco Comments    I stopped smoking 20 years ago     Alcohol Consumption:  Social History     Substance and Sexual Activity   Alcohol Use No     Fall Risk Screen:    NASIMA Fall Risk Assessment was " completed, and patient is at LOW risk for falls.Assessment completed on:2023    Depression Screenin/20/2023     9:53 AM   PHQ-2/PHQ-9 Depression Screening   Little Interest or Pleasure in Doing Things 0-->not at all   Feeling Down, Depressed or Hopeless 1-->several days   PHQ-9: Brief Depression Severity Measure Score 1       Health Habits and Functional and Cognitive Screenin/20/2023     9:54 AM   Functional & Cognitive Status   Do you have difficulty preparing food and eating? No   Do you have difficulty bathing yourself, getting dressed or grooming yourself? No   Do you have difficulty using the toilet? No   Do you have difficulty moving around from place to place? No   Do you have trouble with steps or getting out of a bed or a chair? No   Current Diet Diabetic Diet   Dental Exam Up to date   Eye Exam Up to date   Exercise (times per week) 4 times per week        Exercise Comment gym   Do you need help using the phone?  No   Are you deaf or do you have serious difficulty hearing?  No   Do you need help to go to places out of walking distance? No   Do you need help shopping? No   Do you need help preparing meals?  No   Do you need help with housework?  No   Do you need help with laundry? No   Do you need help taking your medications? No   Do you need help managing money? No   Do you ever drive or ride in a car without wearing a seat belt? No   Have you felt unusual stress, anger or loneliness in the last month? Yes   Who do you live with? Spouse   If you need help, do you have trouble finding someone available to you? No   Do you have difficulty concentrating, remembering or making decisions? No       Age-appropriate Screening Schedule:  Refer to the list below for future screening recommendations based on patient's age, sex and/or medical conditions. Orders for these recommended tests are listed in the plan section. The patient has been provided with a written plan.    Health Maintenance    Topic Date Due    URINE MICROALBUMIN  05/01/2024 (Originally 1965)    Hepatitis B (1 of 3 - 3-dose series) 09/20/2024 (Originally 1965)    INFLUENZA VACCINE  10/01/2023    HEMOGLOBIN A1C  11/05/2023    DIABETIC EYE EXAM  03/15/2024    LIPID PANEL  05/05/2024    BMI FOLLOWUP  05/05/2024    ANNUAL WELLNESS VISIT  09/20/2024    COLORECTAL CANCER SCREENING  10/19/2031    TDAP/TD VACCINES (2 - Td or Tdap) 03/28/2033    HEPATITIS C SCREENING  Completed    COVID-19 Vaccine  Completed    Pneumococcal Vaccine 0-64  Completed    ZOSTER VACCINE  Completed    DIABETIC FOOT EXAM  Discontinued                  CMS Preventative Services Quick Reference  Risk Factors Identified During Encounter  None Identified  The above risks/problems have been discussed with the patient.  Pertinent information has been shared with the patient in the After Visit Summary.  An After Visit Summary and PPPS were made available to the patient.    Follow Up:   Next Medicare Wellness visit to be scheduled in 1 year.       Additional E&M Note during same encounter follows:  Patient has multiple medical problems which are significant and separately identifiable that require additional work above and beyond the Medicare Wellness Visit.      Chief Complaint  Hypertension    Subjective        Here for annual wellness visit.    In addition, his blood pressure has not been well-controlled at home. He says the other day it was 200/110. He occasionally gets headaches, and his blood pressure is higher than 14/90. No dizziness or swelling or chest pain.    He is continuing to lose weight with Ozempic, watching diet, and regular exercise at 2 different gyms. He does feel he has hit a plateau.    Hypertension  Associated symptoms include headaches. Pertinent negatives include no shortness of breath.   Ricardo Menchaca Jr. is also being seen today for hypertension.    Review of Systems   Respiratory:  Negative for cough and shortness of breath.   "  Neurological:  Negative for dizziness.   Psychiatric/Behavioral:  Negative for suicidal ideas.      Objective   Vital Signs:  /90 (BP Location: Right arm, Patient Position: Sitting, Cuff Size: Adult)   Pulse 90   Ht 180.3 cm (71\")   Wt 129 kg (284 lb)   SpO2 97%   BMI 39.61 kg/m²     Physical Exam  Vitals and nursing note reviewed.   Constitutional:       General: He is not in acute distress.     Appearance: Normal appearance. He is not ill-appearing, toxic-appearing or diaphoretic.   HENT:      Head: Normocephalic and atraumatic.   Eyes:      General: No scleral icterus.        Right eye: No discharge.         Left eye: No discharge.      Conjunctiva/sclera: Conjunctivae normal.      Pupils: Pupils are equal, round, and reactive to light.   Neck:      Vascular: No carotid bruit.      Comments: No thyromegaly or mass appreciated.    Cardiovascular:      Rate and Rhythm: Normal rate and regular rhythm.   Pulmonary:      Effort: Pulmonary effort is normal.      Breath sounds: Normal breath sounds.   Abdominal:      General: There is no distension.      Palpations: Abdomen is soft.      Tenderness: There is no abdominal tenderness.   Musculoskeletal:      Cervical back: Neck supple.      Right lower leg: No edema.      Left lower leg: No edema.   Lymphadenopathy:      Cervical: No cervical adenopathy.   Skin:     General: Skin is warm and dry.   Neurological:      Mental Status: He is alert and oriented to person, place, and time.   Psychiatric:         Mood and Affect: Mood normal.         Behavior: Behavior normal.         Thought Content: Thought content normal.         Judgment: Judgment normal.                  As a separate issue today, we addressed his blood pressure. I will increase his Cardura to 4 mg daily.      Assessment and Plan   Diagnoses and all orders for this visit:    1. Encounter for subsequent annual wellness visit (AWV) in Medicare patient (Primary)  -     Comprehensive metabolic " panel; Future  -     Lipid panel; Future  -     CBC No Differential; Future    2. Primary hypertension  -     Cancel: Basic metabolic panel; Future  -     doxazosin (Cardura) 4 MG tablet; Take 1 tablet by mouth Every Night.  Dispense: 90 tablet; Refill: 3  -     Comprehensive metabolic panel; Future    3. Type 2 diabetes mellitus without complication, without long-term current use of insulin  -     Hemoglobin A1c; Future    4. Class 3 severe obesity due to excess calories with serious comorbidity and body mass index (BMI) of 40.0 to 44.9 in adult    5. Elevated serum creatinine  -     Cancel: Basic metabolic panel; Future    6. Mixed hyperlipidemia  -     Lipid panel; Future    7. Screening for prostate cancer  -     PSA SCREENING; Future             Follow Up   Return in about 3 months (around 12/20/2023) for follow up with Dr. Barahona.  Patient was given instructions and counseling regarding his condition or for health maintenance advice. Please see specific information pulled into the AVS if appropriate.

## 2023-12-14 DIAGNOSIS — I10 ESSENTIAL HYPERTENSION: ICD-10-CM

## 2023-12-14 RX ORDER — AMLODIPINE AND OLMESARTAN MEDOXOMIL 10; 40 MG/1; MG/1
1 TABLET ORAL DAILY
Qty: 90 TABLET | Refills: 0 | Status: SHIPPED | OUTPATIENT
Start: 2023-12-14

## 2023-12-22 ENCOUNTER — HOSPITAL ENCOUNTER (EMERGENCY)
Facility: HOSPITAL | Age: 58
Discharge: HOME | End: 2023-12-22
Attending: EMERGENCY MEDICINE
Payer: MEDICARE

## 2023-12-22 VITALS
OXYGEN SATURATION: 97 % | HEART RATE: 99 BPM | SYSTOLIC BLOOD PRESSURE: 149 MMHG | DIASTOLIC BLOOD PRESSURE: 82 MMHG | RESPIRATION RATE: 18 BRPM | WEIGHT: 284 LBS | BODY MASS INDEX: 39.76 KG/M2 | HEIGHT: 71 IN | TEMPERATURE: 98.2 F

## 2023-12-22 DIAGNOSIS — M10.171 LEAD-INDUCED ACUTE GOUT OF RIGHT ANKLE, INITIAL ENCOUNTER: ICD-10-CM

## 2023-12-22 DIAGNOSIS — M70.22 OLECRANON BURSITIS OF LEFT ELBOW: Primary | ICD-10-CM

## 2023-12-22 DIAGNOSIS — T56.0X1A LEAD-INDUCED ACUTE GOUT OF RIGHT ANKLE, INITIAL ENCOUNTER: ICD-10-CM

## 2023-12-22 PROCEDURE — 63710000001 PREDNISONE PER 1 MG: Performed by: EMERGENCY MEDICINE

## 2023-12-22 PROCEDURE — 99283 EMERGENCY DEPT VISIT LOW MDM: CPT

## 2023-12-22 RX ORDER — PREDNISONE 20 MG/1
60 TABLET ORAL DAILY
Qty: 12 TABLET | Refills: 0 | Status: SHIPPED | OUTPATIENT
Start: 2023-12-22 | End: 2023-12-28

## 2023-12-22 RX ORDER — PREDNISONE 20 MG/1
60 TABLET ORAL ONCE
Status: COMPLETED | OUTPATIENT
Start: 2023-12-22 | End: 2023-12-22

## 2023-12-22 RX ORDER — TRAMADOL HYDROCHLORIDE 50 MG/1
50 TABLET ORAL EVERY 6 HOURS PRN
Status: DISCONTINUED | OUTPATIENT
Start: 2023-12-22 | End: 2023-12-23 | Stop reason: HOSPADM

## 2023-12-22 RX ORDER — TRAMADOL HYDROCHLORIDE 50 MG/1
50 TABLET ORAL EVERY 6 HOURS PRN
Qty: 10 TABLET | Refills: 0 | Status: SHIPPED | OUTPATIENT
Start: 2023-12-22 | End: 2023-12-28

## 2023-12-22 RX ADMIN — TRAMADOL HYDROCHLORIDE 50 MG: 50 TABLET, COATED ORAL at 23:14

## 2023-12-22 RX ADMIN — PREDNISONE 60 MG: 20 TABLET ORAL at 23:15

## 2023-12-23 NOTE — ED PROVIDER NOTES
Subjective   History of Present Illness  Pt presents to the  with report of R ankle pain and L elbow pain- reports hx of the same.  + gout hx.  States was told not to take his home meds d/t elevated Cr.  No injuries.  No n/v/f/c.          Review of Systems   Constitutional:  Negative for fever.   Respiratory:  Negative for shortness of breath.    Musculoskeletal:  Positive for arthralgias.   Skin:  Negative for rash.   Neurological:  Negative for numbness.   All other systems reviewed and are negative.      Past Medical History:   Diagnosis Date    Anxiety     Arthritis     Depression     Diabetes mellitus     Gout     Hypertension     Low back pain     Obese        Allergies   Allergen Reactions    Lortab [Hydrocodone-Acetaminophen] Angioedema    Penicillins Anaphylaxis    Shrimp Angioedema       Past Surgical History:   Procedure Laterality Date    CARDIAC CATHETERIZATION  2015    Left Heart    COLONOSCOPY N/A 10/19/2021    Procedure: COLONOSCOPY WITH ANESTHESIA;  Surgeon: Franklin Sharma MD;  Location: [facility] ENDOSCOPY;  Service: Gastroenterology;  Laterality: N/A;  pre op: screening  post op: diverticulosis, poor prep  PCP: none    COLONOSCOPY W/ BIOPSIES  08/10/2005    Mild active colitis with significant increase in eosinophils, small hemorrhoids     ENDOSCOPY  2005    Large HH, negative for celiac disease    SPINAL FUSION         Family History   Problem Relation Age of Onset    Heart disease Mother     Diabetes Mother         She     Hypertension Mother     Rheum arthritis Father     Cancer Father     Diabetes Father         He     Hypertension Father     Rheum arthritis Sister        Social History     Socioeconomic History    Marital status:    Tobacco Use    Smoking status: Former     Packs/day: 1.00     Years: 5.00     Additional pack years: 0.00     Total pack years: 5.00     Types: Cigarettes, Cigars    Smokeless tobacco: Never    Tobacco comments:     I stopped smoking   years ago   Vaping Use    Vaping Use: Never used   Substance and Sexual Activity    Alcohol use: No    Drug use: No    Sexual activity: Yes           Objective   Physical Exam  Vitals and nursing note reviewed.   Constitutional:       General: He is not in acute distress.     Appearance: Normal appearance.   Cardiovascular:      Rate and Rhythm: Normal rate and regular rhythm.      Pulses: Normal pulses.      Heart sounds: Normal heart sounds.   Musculoskeletal:      Comments: R ankle with mild swelling.  No erythema/warmth.  Mild ttp.  NVT intact    L elbow with + swelling to olecranon bursa.  No erythema/warmth.  FAROM.  NVT intact   Skin:     General: Skin is dry.   Neurological:      Mental Status: He is alert.         Procedures           ED Course                                             Medical Decision Making  Pt stable in EC - no evid of infection.  Findings c/w gout flare and olecranon bursitis.  Pt given ultram/prednisone and will d/c with the same for sx.  Recommend outpt /fu    Risk  Prescription drug management.        Final diagnoses:   Lead-induced acute gout of right ankle, initial encounter   Olecranon bursitis of left elbow       ED Disposition  ED Disposition       ED Disposition   Discharge    Condition   Stable    Comment   --               Arturo Barahona, DO  [address] [facility] [address]  [address]  [place] [id number] [phone]               Medication List        New Prescriptions      predniSONE 20 MG tablet  Commonly known as: DELTASONE  Take 3 tablets by mouth Daily for 4 days.     traMADol 50 MG tablet  Commonly known as: ULTRAM  Take 1 tablet by mouth Every 6 (Six) Hours As Needed for Moderate Pain.               Where to Get Your Medications        These medications were sent to [facility] [address] [place] [address] - [email or web address] - [phone] [facility] [phone]   [email or web address] [address]      Phone: [phone]   predniSONE 20 MG  tablet  traMADol 50 MG tablet            Neo Celis DO  12/22/23 4488

## 2023-12-27 NOTE — PROGRESS NOTES
Chief Complaint  Diabetes follow up, ER follow up elbow and foot pain    Subjective        Ricardo Menchaca Jr. presents to [facility] for a three month follow up of diabetes and also an ER follow up of elbow and foot pain.     Ricardo had labs drawn at [facility] prior to this visit (these have been scanned in chart and reviewed by me).     Labs show GFR increased from previous of 48 to now 54; creatinine 1.5, A1C 5,7, ALT slightly elevated at 43 otherwise liver enzymes are normal, vit D 43.6, UDS negative, CBC unremarkable and labs otherwise unremarkable.    Blood pressure slightly elevated today. Ricadro is in pain and has recently completed steroids.     He presented to the ED on 12/22/23 for evaluation of left elbow and right foot pain.  Has had ongoing issues with right foot pain.  Had x-rays which showed some bone spurring on the calcaneal bone and old trauma to the malleolus.   Has not seen podiatry. Reports severe heel pain. Has tried different shoes and insoles for plantar fascitis, has tried exercises in which he rolls water bottle under foot, has tried voltaren gel. Is unable to take long term steroids due to GFR. He completed prednisone and ultram which helped. Pain is more severe since he is out of meds and is limited on what he can take. Weight bearing makes the pain worse. Sitting makes the pain better. Does not have history of neuropathy.     Was diagnosed with olecranon bursitis and had fluid aspiration. Says elbow is continuing to swell. Does see [facility] and follows back up in two weeks. Reports significant pain. Denies injury. Does not use any repetitive motions of that arm.     Denies fever or constitutional symptoms.         ROS- As noted per HPI  Otherwise no chest pain, palpitations, edema, shortness of breath    Objective   Vital Signs:  /95 (BP Location: Left arm, Patient Position: Sitting, Cuff Size: Large Adult)   Pulse 99   Temp 98.6 °F (37 °C) (Oral)    "Resp 16   Ht 180.3 cm (71\")   Wt 134 kg (295 lb)   SpO2 96%   BMI 41.14 kg/m²   Estimated body mass index is 41.14 kg/m² as calculated from the following:    Height as of this encounter: 180.3 cm (71\").    Weight as of this encounter: 134 kg (295 lb).         Physical Exam  Vitals and nursing note reviewed.   Constitutional:       Appearance: Normal appearance. He is normal weight.      Comments: Appears in pain   HENT:      Nose: Nose normal.      Mouth/Throat:      Mouth: Mucous membranes are moist.   Cardiovascular:      Rate and Rhythm: Normal rate and regular rhythm.      Pulses:           Dorsalis pedis pulses are 3+ on the right side and 3+ on the left side.        Posterior tibial pulses are 3+ on the right side and 3+ on the left side.      Heart sounds: No murmur heard.     No friction rub. No gallop.   Pulmonary:      Effort: Pulmonary effort is normal. No respiratory distress.      Breath sounds: Normal breath sounds. No wheezing.   Musculoskeletal:      Right elbow: Normal.      Left elbow: Swelling and effusion present. Normal range of motion. Tenderness present in olecranon process.      Cervical back: Normal range of motion and neck supple.      Right foot: Decreased range of motion (pain with plantar and dorsiflexion). No foot drop.      Left foot: Normal range of motion. No foot drop.        Feet:    Feet:      Right foot:      Skin integrity: Skin integrity normal. No ulcer, blister, skin breakdown, erythema or warmth.      Left foot:      Skin integrity: Skin integrity normal. No ulcer, blister, skin breakdown, erythema or warmth.   Skin:     General: Skin is warm and dry.      Capillary Refill: Capillary refill takes less than 2 seconds.   Neurological:      General: No focal deficit present.      Mental Status: He is alert and oriented to person, place, and time.   Psychiatric:         Mood and Affect: Mood normal.         Behavior: Behavior normal.        Result Review :  The following " data was reviewed by: SUKI Cardona on 12/28/2023:  CMP          3/24/2023    10:07 5/5/2023    08:58   CMP   Glucose 99  99    BUN 28  27    Creatinine 1.37  1.67    EGFR 60.9  48.0    Sodium 139  137    Potassium 4.4  4.7    Chloride 104  105    Calcium 9.1  10.0    Total Protein 7.4  7.6    Albumin 4.4  4.3    Globulin 3.0  3.3    Total Bilirubin 0.2  0.2    Alkaline Phosphatase 117  116    AST (SGOT) 41  41    ALT (SGPT) 32  41    Albumin/Globulin Ratio 1.5  1.3    BUN/Creatinine Ratio 20.4  16.2    Anion Gap 11.0  8.6      CBC          3/24/2023    10:07 5/5/2023    08:58   CBC   WBC 6.28  6.53    RBC 4.58  4.90    Hemoglobin 12.6  14.1    Hematocrit 40.8  40.9    MCV 89.1  83.5    MCH 27.5  28.8    MCHC 30.9  34.5    RDW 15.2  15.1    Platelets 300  292      Lipid Panel          5/5/2023    08:58   Lipid Panel   Total Cholesterol 151    Triglycerides 69    HDL Cholesterol 52    VLDL Cholesterol 14    LDL Cholesterol  85    LDL/HDL Ratio 1.64        Most Recent A1C          5/5/2023    08:58   HGBA1C Most Recent   Hemoglobin A1C 5.80      Data reviewed : Radiologic studies foot and ankle x-rays             Assessment and Plan   Diagnoses and all orders for this visit:    1. Type 2 diabetes mellitus without complication, without long-term current use of insulin (Primary)    2. Primary hypertension  Comments:  Does not meet goal today of 140/90 per JNC 8 guidelines. Is in a lot of pain. Monitor at home    3. Olecranon bursitis of left elbow  Comments:  Follow up with ortho in 2 weeks as scheduled or sooner prn  Seek emergency care for worsening. 3 day tramadol.  Orders:  -     methylPREDNISolone acetate (DEPO-medrol) injection 80 mg  -     ketorolac (TORADOL) injection 15 mg  -     traMADol (ULTRAM) 50 MG tablet; Take 1 tablet by mouth Every 6 (Six) Hours As Needed for Moderate Pain for up to 3 days.  Dispense: 12 tablet; Refill: 0    4. Idiopathic chronic gout of multiple sites without  reginaldo  Comments:  Continue low purine diet, uloric as directed  Orders:  -     Ambulatory Referral to Podiatry  -     traMADol (ULTRAM) 50 MG tablet; Take 1 tablet by mouth Every 6 (Six) Hours As Needed for Moderate Pain for up to 3 days.  Dispense: 12 tablet; Refill: 0    5. Plantar fasciitis of right foot  Comments:  Continue exercises. Refer to podiatry.  Overview:  Continue exercises. Refer to podiatry.    Assessment & Plan:  Three day course of tramadol. We discussed the controlled nature of this prescription and the necessity of obtaining from only our office, filling at the same pharmacy, and taking only as directed.  Will give injection of steroids. Monitor blood pressure.   Will give very small dose of toradol one time only for inflammation. Last GFR 54. Avoid NSAIDs otherwise.   For worsening symptoms prior to podiatry referral return or seek emergency care.     Orders:  -     Ambulatory Referral to Podiatry  -     traMADol (ULTRAM) 50 MG tablet; Take 1 tablet by mouth Every 6 (Six) Hours As Needed for Moderate Pain for up to 3 days.  Dispense: 12 tablet; Refill: 0             Follow Up   Return in about 3 months (around 3/28/2024) for Recheck.  Patient was given instructions and counseling regarding his condition or for health maintenance advice. Please see specific information pulled into the AVS if appropriate.     Ricardo verbalizes understanding and agreement with this plan of care.

## 2023-12-28 ENCOUNTER — OFFICE VISIT (OUTPATIENT)
Dept: INTERNAL MEDICINE | Facility: CLINIC | Age: 58
End: 2023-12-28
Payer: MEDICARE

## 2023-12-28 VITALS
SYSTOLIC BLOOD PRESSURE: 132 MMHG | BODY MASS INDEX: 41.3 KG/M2 | HEART RATE: 99 BPM | OXYGEN SATURATION: 96 % | HEIGHT: 71 IN | TEMPERATURE: 98.6 F | RESPIRATION RATE: 16 BRPM | WEIGHT: 295 LBS | DIASTOLIC BLOOD PRESSURE: 95 MMHG

## 2023-12-28 DIAGNOSIS — E11.9 TYPE 2 DIABETES MELLITUS WITHOUT COMPLICATION, WITHOUT LONG-TERM CURRENT USE OF INSULIN: Primary | ICD-10-CM

## 2023-12-28 DIAGNOSIS — M70.22 OLECRANON BURSITIS OF LEFT ELBOW: ICD-10-CM

## 2023-12-28 DIAGNOSIS — M72.2 PLANTAR FASCIITIS OF RIGHT FOOT: ICD-10-CM

## 2023-12-28 DIAGNOSIS — I10 PRIMARY HYPERTENSION: ICD-10-CM

## 2023-12-28 DIAGNOSIS — M1A.09X0 IDIOPATHIC CHRONIC GOUT OF MULTIPLE SITES WITHOUT TOPHUS: ICD-10-CM

## 2023-12-28 RX ORDER — METHYLPREDNISOLONE ACETATE 80 MG/ML
80 INJECTION, SUSPENSION INTRA-ARTICULAR; INTRALESIONAL; INTRAMUSCULAR; SOFT TISSUE ONCE
Status: COMPLETED | OUTPATIENT
Start: 2023-12-28 | End: 2023-12-28

## 2023-12-28 RX ORDER — TRAMADOL HYDROCHLORIDE 50 MG/1
50 TABLET ORAL EVERY 6 HOURS PRN
Qty: 12 TABLET | Refills: 0 | Status: SHIPPED | OUTPATIENT
Start: 2023-12-28 | End: 2023-12-31

## 2023-12-28 RX ORDER — KETOROLAC TROMETHAMINE 30 MG/ML
15 INJECTION, SOLUTION INTRAMUSCULAR; INTRAVENOUS ONCE
Status: COMPLETED | OUTPATIENT
Start: 2023-12-28 | End: 2023-12-28

## 2023-12-28 RX ADMIN — METHYLPREDNISOLONE ACETATE 80 MG: 80 INJECTION, SUSPENSION INTRA-ARTICULAR; INTRALESIONAL; INTRAMUSCULAR; SOFT TISSUE at 08:53

## 2023-12-28 RX ADMIN — KETOROLAC TROMETHAMINE 15 MG: 30 INJECTION, SOLUTION INTRAMUSCULAR; INTRAVENOUS at 08:51

## 2023-12-28 NOTE — ASSESSMENT & PLAN NOTE
Three day course of tramadol. We discussed the controlled nature of this prescription and the necessity of obtaining from only our office, filling at the same pharmacy, and taking only as directed.  Will give injection of steroids. Monitor blood pressure.   Will give very small dose of toradol one time only for inflammation. Last GFR 54. Avoid NSAIDs otherwise.   For worsening symptoms prior to podiatry referral return or seek emergency care.

## 2024-03-05 DIAGNOSIS — I10 ESSENTIAL HYPERTENSION: ICD-10-CM

## 2024-03-05 DIAGNOSIS — I10 PRIMARY HYPERTENSION: ICD-10-CM

## 2024-03-06 RX ORDER — DOXAZOSIN MESYLATE 4 MG/1
4 TABLET ORAL NIGHTLY
Qty: 90 TABLET | Refills: 0 | Status: SHIPPED | OUTPATIENT
Start: 2024-03-06

## 2024-03-06 RX ORDER — AMLODIPINE AND OLMESARTAN MEDOXOMIL 10; 40 MG/1; MG/1
1 TABLET ORAL DAILY
Qty: 90 TABLET | Refills: 0 | Status: SHIPPED | OUTPATIENT
Start: 2024-03-06

## 2024-03-06 NOTE — TELEPHONE ENCOUNTER
Rx Refill Note  Requested Prescriptions     Pending Prescriptions Disp Refills    amlodipine-olmesartan (PACO) 10-40 MG per tablet [Pharmacy Med Name: amLODIPine-Olmesartan 10-40 MG Oral Tablet] 90 tablet 0     Sig: Take 1 tablet by mouth once daily    doxazosin (CARDURA) 2 MG tablet [Pharmacy Med Name: Doxazosin Mesylate 2 MG Oral Tablet] 90 tablet 0     Sig: TAKE 1 TABLET BY MOUTH ONCE DAILY AT NIGHT      Last office visit with prescribing clinician: 5/5/2023   Last telemedicine visit with prescribing clinician: Visit date not found   Next office visit with prescribing clinician: 4/9/2024                         Would you like a call back once the refill request has been completed: [] Yes [] No    If the office needs to give you a call back, can they leave a voicemail: [] Yes [] No    Lucas Ledezma MA  03/06/24, 08:34 CST

## 2024-04-09 ENCOUNTER — LAB (OUTPATIENT)
Dept: LAB | Facility: HOSPITAL | Age: 59
End: 2024-04-09
Payer: MEDICARE

## 2024-04-09 ENCOUNTER — TELEPHONE (OUTPATIENT)
Dept: INTERNAL MEDICINE | Facility: CLINIC | Age: 59
End: 2024-04-09

## 2024-04-09 ENCOUNTER — OFFICE VISIT (OUTPATIENT)
Dept: INTERNAL MEDICINE | Facility: CLINIC | Age: 59
End: 2024-04-09
Payer: MEDICARE

## 2024-04-09 VITALS
HEIGHT: 71 IN | RESPIRATION RATE: 16 BRPM | OXYGEN SATURATION: 98 % | WEIGHT: 297.4 LBS | DIASTOLIC BLOOD PRESSURE: 97 MMHG | HEART RATE: 69 BPM | SYSTOLIC BLOOD PRESSURE: 154 MMHG | BODY MASS INDEX: 41.64 KG/M2

## 2024-04-09 DIAGNOSIS — M1A.09X0 IDIOPATHIC CHRONIC GOUT OF MULTIPLE SITES WITHOUT TOPHUS: ICD-10-CM

## 2024-04-09 DIAGNOSIS — S39.012A ACUTE MYOFASCIAL STRAIN OF LUMBAR REGION, INITIAL ENCOUNTER: ICD-10-CM

## 2024-04-09 DIAGNOSIS — E66.01 CLASS 3 SEVERE OBESITY DUE TO EXCESS CALORIES WITH SERIOUS COMORBIDITY AND BODY MASS INDEX (BMI) OF 40.0 TO 44.9 IN ADULT: ICD-10-CM

## 2024-04-09 DIAGNOSIS — I10 PRIMARY HYPERTENSION: ICD-10-CM

## 2024-04-09 DIAGNOSIS — M48.061 SPINAL STENOSIS OF LUMBAR REGION WITHOUT NEUROGENIC CLAUDICATION: ICD-10-CM

## 2024-04-09 DIAGNOSIS — E11.9 TYPE 2 DIABETES MELLITUS WITHOUT COMPLICATION, WITHOUT LONG-TERM CURRENT USE OF INSULIN: ICD-10-CM
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
## 2024-04-09 DIAGNOSIS — Z12.5 ENCOUNTER FOR PROSTATE CANCER SCREENING: ICD-10-CM

## 2024-04-09 DIAGNOSIS — I1A.0 RESISTANT HYPERTENSION: ICD-10-CM

## 2024-04-09 LAB
ALBUMIN SERPL-MCNC: 4.6 G/DL (ref 3.5–5.2)
ALBUMIN/GLOB SERPL: 1.4 G/DL
ALP SERPL-CCNC: 130 U/L (ref 39–117)
ALT SERPL W P-5'-P-CCNC: 28 U/L (ref 1–41)
ANION GAP SERPL CALCULATED.3IONS-SCNC: 10 MMOL/L (ref 5–15)
AST SERPL-CCNC: 25 U/L (ref 1–40)
BILIRUB SERPL-MCNC: 0.2 MG/DL (ref 0–1.2)
BUN SERPL-MCNC: 18 MG/DL (ref 6–20)
BUN/CREAT SERPL: 16.1 (ref 7–25)
CALCIUM SPEC-SCNC: 9.5 MG/DL (ref 8.6–10.5)
CHLORIDE SERPL-SCNC: 103 MMOL/L (ref 98–107)
CHOLEST SERPL-MCNC: 113 MG/DL (ref 0–200)
CO2 SERPL-SCNC: 26 MMOL/L (ref 22–29)
CREAT SERPL-MCNC: 1.12 MG/DL (ref 0.76–1.27)
DEPRECATED RDW RBC AUTO: 45.4 FL (ref 37–54)
EGFRCR SERPLBLD CKD-EPI 2021: 77.1 ML/MIN/1.73
ERYTHROCYTE [DISTWIDTH] IN BLOOD BY AUTOMATED COUNT: 14.3 % (ref 12.3–15.4)
GLOBULIN UR ELPH-MCNC: 3.3 GM/DL
GLUCOSE SERPL-MCNC: 89 MG/DL (ref 65–99)
HBA1C MFR BLD: 5.5 % (ref 4.8–5.6)
HCT VFR BLD AUTO: 43.5 % (ref 37.5–51)
HDLC SERPL-MCNC: 45 MG/DL (ref 40–60)
HGB BLD-MCNC: 13.9 G/DL (ref 13–17.7)
LDLC SERPL CALC-MCNC: 55 MG/DL (ref 0–100)
LDLC/HDLC SERPL: 1.26 {RATIO}
MCH RBC QN AUTO: 27.7 PG (ref 26.6–33)
MCHC RBC AUTO-ENTMCNC: 32 G/DL (ref 31.5–35.7)
MCV RBC AUTO: 86.8 FL (ref 79–97)
PLATELET # BLD AUTO: 249 10*3/MM3 (ref 140–450)
PMV BLD AUTO: 10 FL (ref 6–12)
POTASSIUM SERPL-SCNC: 4.3 MMOL/L (ref 3.5–5.2)
PROT SERPL-MCNC: 7.9 G/DL (ref 6–8.5)
PSA SERPL-MCNC: 0.3 NG/ML (ref 0–4)
RBC # BLD AUTO: 5.01 10*6/MM3 (ref 4.14–5.8)
SODIUM SERPL-SCNC: 139 MMOL/L (ref 136–145)
TRIGL SERPL-MCNC: 56 MG/DL (ref 0–150)
URATE SERPL-MCNC: 8.8 MG/DL (ref 3.4–7)
VLDLC SERPL-MCNC: 13 MG/DL (ref 5–40)
WBC NRBC COR # BLD AUTO: 5.83 10*3/MM3 (ref 3.4–10.8)

## 2024-04-09 PROCEDURE — 83036 HEMOGLOBIN GLYCOSYLATED A1C: CPT

## 2024-04-09 PROCEDURE — G0103 PSA SCREENING: HCPCS

## 2024-04-09 PROCEDURE — 82088 ASSAY OF ALDOSTERONE: CPT | Performed by: INTERNAL MEDICINE

## 2024-04-09 PROCEDURE — 80061 LIPID PANEL: CPT

## 2024-04-09 PROCEDURE — 84244 ASSAY OF RENIN: CPT | Performed by: INTERNAL MEDICINE

## 2024-04-09 PROCEDURE — 84550 ASSAY OF BLOOD/URIC ACID: CPT

## 2024-04-09 PROCEDURE — 83835 ASSAY OF METANEPHRINES: CPT | Performed by: INTERNAL MEDICINE

## 2024-04-09 PROCEDURE — 82627 DEHYDROEPIANDROSTERONE: CPT

## 2024-04-09 PROCEDURE — 36415 COLL VENOUS BLD VENIPUNCTURE: CPT | Performed by: INTERNAL MEDICINE

## 2024-04-09 PROCEDURE — 85027 COMPLETE CBC AUTOMATED: CPT

## 2024-04-09 PROCEDURE — 80053 COMPREHEN METABOLIC PANEL: CPT

## 2024-04-09 RX ORDER — METHYLPREDNISOLONE ACETATE 40 MG/ML
40 INJECTION, SUSPENSION INTRA-ARTICULAR; INTRALESIONAL; INTRAMUSCULAR; SOFT TISSUE ONCE
Status: COMPLETED | OUTPATIENT
Start: 2024-04-09 | End: 2024-04-09

## 2024-04-09 RX ORDER — SEMAGLUTIDE 2.68 MG/ML
2 INJECTION, SOLUTION SUBCUTANEOUS WEEKLY
Qty: 9 ML | Refills: 3 | Status: SHIPPED | OUTPATIENT
Start: 2024-04-09 | End: 2024-04-09 | Stop reason: SDUPTHER

## 2024-04-09 RX ORDER — DOXAZOSIN 8 MG/1
8 TABLET ORAL NIGHTLY
Qty: 90 TABLET | Refills: 1 | Status: SHIPPED | OUTPATIENT
Start: 2024-04-09

## 2024-04-09 RX ORDER — SEMAGLUTIDE 2.68 MG/ML
2 INJECTION, SOLUTION SUBCUTANEOUS WEEKLY
Qty: 9 ML | Refills: 3 | Status: SHIPPED | OUTPATIENT
Start: 2024-04-09

## 2024-04-09 RX ADMIN — METHYLPREDNISOLONE ACETATE 40 MG: 40 INJECTION, SUSPENSION INTRA-ARTICULAR; INTRALESIONAL; INTRAMUSCULAR; SOFT TISSUE at 08:53

## 2024-04-09 NOTE — PROGRESS NOTES
CC: back and wrist pain    History:  Ricardo Menchaca Jr. is a 58 y.o. male   He notes he had a fall last Monday, falling back onto his backside and right wrist. He has been wearing a brace on the right, but still has pain across the carpal bones. He has been taking tylenol and aleve as this fall has exacerbated his back pain. He has no saddle anesthesia nor changes to bowel/bladder habits. He has radiation to the right leg, which he has had before as well. His BP has been running ~140/90 at home despite adherence to his meds.       ROS:  Review of Systems   Constitutional:  Negative for fever.   Cardiovascular:  Negative for chest pain.   Gastrointestinal:  Negative for abdominal pain.   Genitourinary:  Negative for dysuria.   Musculoskeletal:  Positive for arthralgias and back pain.   Neurological:  Negative for weakness.        reports that he has quit smoking. His smoking use included cigarettes and cigars. He has a 5 pack-year smoking history. He has been exposed to tobacco smoke. He has never used smokeless tobacco. He reports that he does not drink alcohol and does not use drugs.      Current Outpatient Medications:     amitriptyline (ELAVIL) 10 MG tablet, Take 1 tablet by mouth Every Night., Disp: , Rfl:     amlodipine-olmesartan (PACO) 10-40 MG per tablet, Take 1 tablet by mouth Daily., Disp: 90 tablet, Rfl: 0    atorvastatin (LIPITOR) 20 MG tablet, Take 1 tablet by mouth Daily., Disp: 90 tablet, Rfl: 1    Diclofenac Sodium (VOLTAREN) 1 % gel gel, Apply  topically to the appropriate area as directed 4 (Four) Times a Day., Disp: 400 g, Rfl: 3    doxazosin (CARDURA) 4 MG tablet, Take 1 tablet by mouth Every Night., Disp: 90 tablet, Rfl: 0    DULoxetine (CYMBALTA) 60 MG capsule, Take 1 capsule by mouth Daily., Disp: , Rfl:     febuxostat (ULORIC) 80 MG tablet tablet, Take 1 tablet by mouth Daily., Disp: , Rfl:     Semaglutide, 1 MG/DOSE, (OZEMPIC) 2 MG/1.5ML solution pen-injector, Inject 1 mg under the  "skin into the appropriate area as directed 1 (One) Time Per Week., Disp: , Rfl:     OBJECTIVE:  /97 (BP Location: Left arm, Patient Position: Sitting, Cuff Size: Adult)   Pulse 69   Resp 16   Ht 180.3 cm (71\")   Wt 135 kg (297 lb 6.4 oz)   SpO2 98%   BMI 41.48 kg/m²    Physical Exam  Constitutional:       General: He is not in acute distress.  Cardiovascular:      Rate and Rhythm: Normal rate and regular rhythm.      Heart sounds: Normal heart sounds. No murmur heard.  Pulmonary:      Effort: Pulmonary effort is normal.      Breath sounds: Normal breath sounds. No wheezing.   Musculoskeletal:      Comments: No tenderness over anatomic snuffbox. Gait normal.    Neurological:      Mental Status: He is alert and oriented to person, place, and time.      Gait: Gait normal.   Psychiatric:         Mood and Affect: Mood normal.         Behavior: Behavior normal.         Assessment/Plan     Diagnoses and all orders for this visit:    1. Fall, initial encounter (Primary)  -     XR Wrist 3+ View Right; Future  Roentgenogram to rule out occult fracture, especially of the scaphoid in light of a FOOSH injury.     2. Acute myofascial strain of lumbar region, initial encounter  3. Spinal stenosis of lumbar region without neurogenic claudication  -     methylPREDNISolone acetate (DEPO-medrol) injection 40 mg  Corticosteroid injection for antiinflammatory effect.     4. Type 2 diabetes mellitus without complication, without long-term current use of insulin  -     Comprehensive Metabolic Panel; Future  -     Hemoglobin A1c; Future  -     Lipid Panel; Future  -     Discontinue: Semaglutide, 2 MG/DOSE, (Ozempic, 2 MG/DOSE,) 8 MG/3ML solution pen-injector; Inject 2 mg under the skin into the appropriate area as directed 1 (One) Time Per Week.  Dispense: 9 mL; Refill: 3  -     Semaglutide, 2 MG/DOSE, (Ozempic, 2 MG/DOSE,) 8 MG/3ML solution pen-injector; Inject 2 mg under the skin into the appropriate area as directed 1 (One) " Time Per Week.  Dispense: 9 mL; Refill: 3  Increase Ozempic to 2mg for improved glycemic and weight management.     5. Idiopathic chronic gout of multiple sites without tophus  -     Uric Acid; Future  Check uric acid with goal of <6. No recent flares of gout.     6. Class 3 severe obesity due to excess calories with serious comorbidity and body mass index (BMI) of 40.0 to 44.9 in adult     7. Primary hypertension  -     CBC (No Diff); Future  -     doxazosin (CARDURA) 8 MG tablet; Take 1 tablet by mouth Every Night.  Dispense: 90 tablet; Refill: 1  Poorly controlled, BP goal for age is <140/90 per JNC 8 guidelines, and increase cardura.    8. Resistant hypertension  -     Aldosterone / Renin Ratio  -     DHEA-Sulfate; Future  -     Metanephrines, Frac. Free, Plasma  Secondary labs for evaluation.     9. Encounter for prostate cancer screening  -     PSA Screen; Future        An After Visit Summary was printed and given to the patient at discharge.  Return in about 6 months (around 10/9/2024) for Medicare Wellness.      Arturo Barahona D.O. 4/9/2024   Electronically signed.

## 2024-04-10 LAB — DHEA-S SERPL-MCNC: 182 UG/DL (ref 48.9–344.2)

## 2024-04-12 LAB
METANEPH FREE SERPL-MCNC: 30.4 PG/ML (ref 0–88)
NORMETANEPHRINE SERPL-MCNC: 134.5 PG/ML (ref 0–244)

## 2024-04-16 LAB
ALDOST SERPL-MCNC: 4.4 NG/DL (ref 0–30)
ALDOST/RENIN PLAS-RTO: 4.5 {RATIO} (ref 0–30)
RENIN PLAS-CCNC: 0.97 NG/ML/HR (ref 0.17–5.38)

## 2024-06-03 DIAGNOSIS — I10 ESSENTIAL HYPERTENSION: ICD-10-CM

## 2024-06-03 RX ORDER — AMLODIPINE AND OLMESARTAN MEDOXOMIL 10; 40 MG/1; MG/1
1 TABLET ORAL DAILY
Qty: 90 TABLET | Refills: 1 | Status: SHIPPED | OUTPATIENT
Start: 2024-06-03

## 2024-06-23 ENCOUNTER — HOSPITAL ENCOUNTER (EMERGENCY)
Facility: HOSPITAL | Age: 59
Discharge: HOME | End: 2024-06-23
Admitting: EMERGENCY MEDICINE
Payer: MEDICARE

## 2024-06-23 ENCOUNTER — APPOINTMENT (OUTPATIENT)
Dept: GENERAL RADIOLOGY | Facility: HOSPITAL | Age: 59
End: 2024-06-23
Payer: MEDICARE

## 2024-06-23 VITALS
WEIGHT: 297.62 LBS | DIASTOLIC BLOOD PRESSURE: 73 MMHG | HEART RATE: 80 BPM | HEIGHT: 71 IN | RESPIRATION RATE: 16 BRPM | TEMPERATURE: 97.7 F | SYSTOLIC BLOOD PRESSURE: 140 MMHG | BODY MASS INDEX: 41.67 KG/M2 | OXYGEN SATURATION: 98 %

## 2024-06-23 DIAGNOSIS — M10.9 ACUTE GOUT OF RIGHT ANKLE, UNSPECIFIED CAUSE: Primary | ICD-10-CM

## 2024-06-23 DIAGNOSIS — M19.90 ARTHRITIS: ICD-10-CM

## 2024-06-23 DIAGNOSIS — M77.31 CALCANEAL SPUR OF RIGHT FOOT: ICD-10-CM

## 2024-06-23 LAB
ANION GAP SERPL CALCULATED.3IONS-SCNC: 11 MMOL/L (ref 5–15)
BASOPHILS # BLD AUTO: 0.03 10*3/MM3 (ref 0–0.2)
BASOPHILS NFR BLD AUTO: 0.4 % (ref 0–1.5)
BUN SERPL-MCNC: 34 MG/DL (ref 6–20)
BUN/CREAT SERPL: 25.2 (ref 7–25)
CALCIUM SPEC-SCNC: 9.4 MG/DL (ref 8.6–10.5)
CHLORIDE SERPL-SCNC: 100 MMOL/L (ref 98–107)
CO2 SERPL-SCNC: 25 MMOL/L (ref 22–29)
CREAT SERPL-MCNC: 1.35 MG/DL (ref 0.76–1.27)
CRP SERPL-MCNC: 0.38 MG/DL (ref 0–0.5)
DEPRECATED RDW RBC AUTO: 45.9 FL (ref 37–54)
EGFRCR SERPLBLD CKD-EPI 2021: 61.6 ML/MIN/1.73
EOSINOPHIL # BLD AUTO: 0.01 10*3/MM3 (ref 0–0.4)
EOSINOPHIL NFR BLD AUTO: 0.1 % (ref 0.3–6.2)
ERYTHROCYTE [DISTWIDTH] IN BLOOD BY AUTOMATED COUNT: 15.1 % (ref 12.3–15.4)
ERYTHROCYTE [SEDIMENTATION RATE] IN BLOOD: 36 MM/HR (ref 0–20)
GLUCOSE SERPL-MCNC: 136 MG/DL (ref 65–99)
HCT VFR BLD AUTO: 41.1 % (ref 37.5–51)
HGB BLD-MCNC: 13.5 G/DL (ref 13–17.7)
IMM GRANULOCYTES # BLD AUTO: 0.01 10*3/MM3 (ref 0–0.05)
IMM GRANULOCYTES NFR BLD AUTO: 0.1 % (ref 0–0.5)
LYMPHOCYTES # BLD AUTO: 1.3 10*3/MM3 (ref 0.7–3.1)
LYMPHOCYTES NFR BLD AUTO: 17.6 % (ref 19.6–45.3)
MCH RBC QN AUTO: 27.6 PG (ref 26.6–33)
MCHC RBC AUTO-ENTMCNC: 32.8 G/DL (ref 31.5–35.7)
MCV RBC AUTO: 84 FL (ref 79–97)
MONOCYTES # BLD AUTO: 0.24 10*3/MM3 (ref 0.1–0.9)
MONOCYTES NFR BLD AUTO: 3.2 % (ref 5–12)
NEUTROPHILS NFR BLD AUTO: 5.81 10*3/MM3 (ref 1.7–7)
NEUTROPHILS NFR BLD AUTO: 78.6 % (ref 42.7–76)
NRBC BLD AUTO-RTO: 0 /100 WBC (ref 0–0.2)
PLATELET # BLD AUTO: 270 10*3/MM3 (ref 140–450)
PMV BLD AUTO: 9.2 FL (ref 6–12)
POTASSIUM SERPL-SCNC: 4.9 MMOL/L (ref 3.5–5.2)
RBC # BLD AUTO: 4.89 10*6/MM3 (ref 4.14–5.8)
SODIUM SERPL-SCNC: 136 MMOL/L (ref 136–145)
URATE SERPL-MCNC: 10 MG/DL (ref 3.4–7)
WBC NRBC COR # BLD AUTO: 7.4 10*3/MM3 (ref 3.4–10.8)

## 2024-06-23 PROCEDURE — 73610 X-RAY EXAM OF ANKLE: CPT

## 2024-06-23 PROCEDURE — 80048 BASIC METABOLIC PNL TOTAL CA: CPT

## 2024-06-23 PROCEDURE — 73630 X-RAY EXAM OF FOOT: CPT

## 2024-06-23 PROCEDURE — 84550 ASSAY OF BLOOD/URIC ACID: CPT

## 2024-06-23 PROCEDURE — 86140 C-REACTIVE PROTEIN: CPT

## 2024-06-23 PROCEDURE — 25010000002 KETOROLAC TROMETHAMINE PER 15 MG

## 2024-06-23 PROCEDURE — 99283 EMERGENCY DEPT VISIT LOW MDM: CPT

## 2024-06-23 PROCEDURE — 85652 RBC SED RATE AUTOMATED: CPT

## 2024-06-23 PROCEDURE — 85025 COMPLETE CBC W/AUTO DIFF WBC: CPT

## 2024-06-23 PROCEDURE — 96372 THER/PROPH/DIAG INJ SC/IM: CPT

## 2024-06-23 PROCEDURE — 36415 COLL VENOUS BLD VENIPUNCTURE: CPT

## 2024-06-23 RX ORDER — KETOROLAC TROMETHAMINE 30 MG/ML
30 INJECTION, SOLUTION INTRAMUSCULAR; INTRAVENOUS ONCE
Status: COMPLETED | OUTPATIENT
Start: 2024-06-23 | End: 2024-06-23

## 2024-06-23 RX ORDER — PREDNISONE 10 MG/1
TABLET ORAL
Qty: 14 TABLET | Refills: 0 | Status: SHIPPED | OUTPATIENT
Start: 2024-06-23 | End: 2024-06-29

## 2024-06-23 RX ADMIN — KETOROLAC TROMETHAMINE 30 MG: 30 INJECTION, SOLUTION INTRAMUSCULAR; INTRAVENOUS at 16:49

## 2024-06-23 NOTE — DISCHARGE INSTRUCTIONS
Today you are seen in the ED for your symptoms.  We have found that you are having an acute gout flare of your ankle and foot.  You should continue your colchicine, I am adding a steroid pack.  We also gave you a shot of anti-inflammatory here in the ER for pain control.  Please follow-up with your PCP in the coming days and return to the ED with any new, worsening, or persistent symptoms.

## 2024-06-23 NOTE — ED PROVIDER NOTES
Subjective   History of Present Illness  Patient is a 58-year-old male who presents to the ED today complaining of right foot and ankle pain that began on waking this morning.  He reports history of gout, has been taking Uloric and colchicine with no relief.  He presents in a walking boot which he states his PCP gave him for acute gout flares to help with pain and ease of walking.  He is reporting pain to the dorsal aspect of the foot as well as circumferentially around the ankle.  There is no obvious erythema or swelling noted at this time.  He denies any fever or other systemic symptoms.  Vital signs are reassuring.  Cap refill and sensation intact, pulse intact, range of motion intact, pedal strength normal and equal bilaterally 5/5.  He is ambulatory on arrival.  Denies any recent injury.  PMH is significant for anxiety, arthritis, depression, diabetes, gout, hypertension, and obesity.  Differential diagnoses: Acute gout flare, fracture, sprain, arthritis, and other.    History provided by:  Patient   used: No        Review of Systems   Constitutional: Negative.    HENT: Negative.     Eyes: Negative.    Respiratory: Negative.     Cardiovascular: Negative.    Gastrointestinal: Negative.    Genitourinary: Negative.    Musculoskeletal:  Positive for arthralgias. Negative for joint swelling.   Skin:  Negative for color change and wound.   Neurological: Negative.    Psychiatric/Behavioral: Negative.         Past Medical History:   Diagnosis Date    Anxiety     Arthritis     Depression     Diabetes mellitus     Gout     Hypertension     Low back pain     Obese        Allergies   Allergen Reactions    Lortab [Hydrocodone-Acetaminophen] Angioedema    Penicillins Anaphylaxis    Shrimp Angioedema       Past Surgical History:   Procedure Laterality Date    CARDIAC CATHETERIZATION  06/27/2015    Left Heart    COLONOSCOPY N/A 10/19/2021    Procedure: COLONOSCOPY WITH ANESTHESIA;  Surgeon: Franklin Sharma,  MD;  Location: [facility] ENDOSCOPY;  Service: Gastroenterology;  Laterality: N/A;  pre op: screening  post op: diverticulosis, poor prep  PCP: none    COLONOSCOPY W/ BIOPSIES  08/10/2005    Mild active colitis with significant increase in eosinophils, small hemorrhoids     ENDOSCOPY  2005    Large HH, negative for celiac disease    SPINAL FUSION         Family History   Problem Relation Age of Onset    Heart disease Mother     Diabetes Mother         She     Hypertension Mother     Rheum arthritis Father     Cancer Father     Diabetes Father         He     Hypertension Father     Rheum arthritis Sister        Social History     Socioeconomic History    Marital status:    Tobacco Use    Smoking status: Former     Current packs/day: 1.00     Average packs/day: 1 pack/day for 5.0 years (5.0 ttl pk-yrs)     Types: Cigarettes, Cigars     Passive exposure: Past    Smokeless tobacco: Never    Tobacco comments:     I stopped smoking 20 years ago   Vaping Use    Vaping status: Never Used   Substance and Sexual Activity    Alcohol use: No    Drug use: No    Sexual activity: Yes       Objective   Physical Exam  Vitals and nursing note reviewed.   Constitutional:       General: He is not in acute distress.     Appearance: Normal appearance. He is not ill-appearing.   HENT:      Head: Normocephalic and atraumatic.      Right Ear: External ear normal.      Left Ear: External ear normal.      Nose: Nose normal.   Eyes:      Conjunctiva/sclera: Conjunctivae normal.   Cardiovascular:      Rate and Rhythm: Normal rate and regular rhythm.      Pulses: Normal pulses.           Dorsalis pedis pulses are 2+ on the right side.        Posterior tibial pulses are 2+ on the right side.      Heart sounds: Normal heart sounds.   Pulmonary:      Effort: Pulmonary effort is normal.      Breath sounds: Normal breath sounds.   Musculoskeletal:      Right lower leg: No edema.      Left lower leg: No edema.      Right ankle: No  swelling or ecchymosis. Tenderness present over the lateral malleolus and medial malleolus. Normal range of motion. Normal pulse.      Right Achilles Tendon: Normal.      Right foot: Normal range of motion and normal capillary refill. Tenderness present. No swelling or crepitus. Normal pulse.   Skin:     General: Skin is warm and dry.   Neurological:      Mental Status: He is alert and oriented to person, place, and time. Mental status is at baseline.      GCS: GCS eye subscore is 4. GCS verbal subscore is 5. GCS motor subscore is 6.   Psychiatric:         Mood and Affect: Mood normal.         Behavior: Behavior normal.         Thought Content: Thought content normal.         Judgment: Judgment normal.       Labs Reviewed   URIC ACID - Abnormal; Notable for the following components:       Result Value    Uric Acid 10.0 (*)     All other components within normal limits   BASIC METABOLIC PANEL - Abnormal; Notable for the following components:    Glucose 136 (*)     BUN 34 (*)     Creatinine 1.35 (*)     BUN/Creatinine Ratio 25.2 (*)     All other components within normal limits    Narrative:     GFR Normal >60  Chronic Kidney Disease <60  Kidney Failure <15     SEDIMENTATION RATE - Abnormal; Notable for the following components:    Sed Rate 36 (*)     All other components within normal limits   CBC WITH AUTO DIFFERENTIAL - Abnormal; Notable for the following components:    Neutrophil % 78.6 (*)     Lymphocyte % 17.6 (*)     Monocyte % 3.2 (*)     Eosinophil % 0.1 (*)     All other components within normal limits   C-REACTIVE PROTEIN - Normal   CBC AND DIFFERENTIAL    Narrative:     The following orders were created for panel order CBC & Differential.  Procedure                               Abnormality         Status                     ---------                               -----------         ------                     CBC Auto Differential[[id number]]        Abnormal            Final result                 Please  view results for these tests on the individual orders.     XR Foot 3+ View Right   Final Result   1.. Arthritic changes of the foot. No acute fracture or dislocation.       This report was signed and finalized on 6/23/2024 5:15 PM by Dr. Rigo St MD.          XR Ankle 3+ View Right   Final Result   1.. Arthritic changes of the hind and midfoot. Calcaneal spurring is   present.   2. No acute fracture.       This report was signed and finalized on 6/23/2024 5:14 PM by Dr. Rigo St MD.            Medications   ketorolac (TORADOL) injection 30 mg (30 mg Intramuscular Given 6/23/24 8650)       Procedures           ED Course                                             Medical Decision Making  Patient is a 58-year-old male who presents to the ED today complaining of right foot and ankle pain that began on waking this morning.  He reports history of gout, has been taking Uloric and colchicine with no relief.  He presents in a walking boot which he states his PCP gave him for acute gout flares to help with pain and ease of walking.  He is reporting pain to the dorsal aspect of the foot as well as circumferentially around the ankle.  There is no obvious erythema or swelling noted at this time.  He denies any fever or other systemic symptoms.  Vital signs are reassuring.  Cap refill and sensation intact, pulse intact, range of motion intact, pedal strength normal and equal bilaterally 5/5.  He is ambulatory on arrival.  Denies any recent injury.  PMH is significant for anxiety, arthritis, depression, diabetes, gout, hypertension, and obesity.  Differential diagnoses: Acute gout flare, fracture, sprain, arthritis, and other.    Patient was given Toradol in the ED for symptom management.    Normal CRP, ESR elevated at 36.  Uric acid elevated at 10.  CBC reveals normal white count, H&H, and platelet level.  BMP reveals very mildly elevated creatinine at 1.35 with a normal GFR.  Could reflect some mild  dehydration, although I do see on chart review where he has had an altered kidney function in the past about a year ago.    X-ray foot reveals arthritic changes, no acute findings.    X-ray ankle reveals arthritic changes of the hind and midfoot. Calcaneal spurring is  present.  2. No acute fracture.    Results discussed at bedside.  He is likely experiencing a gout flare.  There are no obvious signs of septic joint or drainable fluid collection.  Denies any systemic symptoms.  I have recommended he continue his medications as previously prescribed, Uloric and colchicine.  He can also use Tylenol arthritis as needed.  I have recommended against NSAIDs due to some mildly altered renal function today.  I will send a prednisone taper for him to see if this helps with inflammation.  He will follow-up with his PCP; return precautions given.  Vital signs remain reassuring, he is agreeable and appreciative, discharged in stable condition.    Problems Addressed:  Acute gout of right ankle, unspecified cause: complicated acute illness or injury  Arthritis: complicated acute illness or injury  Calcaneal spur of right foot: complicated acute illness or injury    Amount and/or Complexity of Data Reviewed  Labs: ordered.  Radiology: ordered.    Risk  Prescription drug management.        Final diagnoses:   Acute gout of right ankle, unspecified cause   Calcaneal spur of right foot   Arthritis       ED Disposition  ED Disposition       ED Disposition   Discharge    Condition   Stable    Comment   --               Arturo Barahona, DO  [address] [address]  [address] [place], [place] [address] [phone]               Medication List        New Prescriptions      predniSONE 10 MG tablet  Commonly known as: DELTASONE  Take 1 tablet by mouth 3 times a day for 3 days, THEN 1 tablet 2 (Two) Times a Day for 2 days, THEN 1 tablet Daily for 1 day.  Start taking on: June 23, 2024               Where to Get Your Medications         These medications were sent to [facility] [address] [place] [address] - [address] - [phone]  - [phone]   [address], [facility] [address]      Phone: [phone]   predniSONE 10 MG tablet            Yamilex Weber, APRN  06/25/24 9217

## 2024-06-23 NOTE — Clinical Note
[facility] EMERGENCY DEPARTMENT  [address]  [facility] [address]  Phone: [phone]    Ricardo Menchaca was seen and treated in our emergency department on 6/23/2024.  He may return to work on 06/25/2024.         Thank you for choosing [facility].    Yamilex Weber, APRN

## 2024-09-28 DIAGNOSIS — I10 PRIMARY HYPERTENSION: ICD-10-CM

## 2024-09-30 RX ORDER — DOXAZOSIN 8 MG/1
8 TABLET ORAL NIGHTLY
Qty: 90 TABLET | Refills: 1 | Status: SHIPPED | OUTPATIENT
Start: 2024-09-30

## 2024-10-23 ENCOUNTER — OFFICE VISIT (OUTPATIENT)
Dept: INTERNAL MEDICINE | Facility: CLINIC | Age: 59
End: 2024-10-23
Payer: MEDICARE

## 2024-10-23 ENCOUNTER — LAB (OUTPATIENT)
Dept: LAB | Facility: HOSPITAL | Age: 59
End: 2024-10-23
Payer: MEDICARE

## 2024-10-23 VITALS
DIASTOLIC BLOOD PRESSURE: 82 MMHG | RESPIRATION RATE: 16 BRPM | HEIGHT: 71 IN | TEMPERATURE: 97.3 F | WEIGHT: 265 LBS | HEART RATE: 87 BPM | OXYGEN SATURATION: 96 % | SYSTOLIC BLOOD PRESSURE: 138 MMHG | BODY MASS INDEX: 37.1 KG/M2

## 2024-10-23 DIAGNOSIS — Z13.220 SCREENING, LIPID: ICD-10-CM

## 2024-10-23 DIAGNOSIS — R53.83 OTHER FATIGUE: ICD-10-CM

## 2024-10-23 DIAGNOSIS — E11.9 TYPE 2 DIABETES MELLITUS WITHOUT COMPLICATION, WITHOUT LONG-TERM CURRENT USE OF INSULIN: ICD-10-CM

## 2024-10-23 DIAGNOSIS — M10.9 ACUTE GOUT OF RIGHT ANKLE, UNSPECIFIED CAUSE: ICD-10-CM

## 2024-10-23 DIAGNOSIS — Z00.00 MEDICARE ANNUAL WELLNESS VISIT, SUBSEQUENT: Primary | ICD-10-CM

## 2024-10-23 DIAGNOSIS — F41.9 ANXIETY AND DEPRESSION: ICD-10-CM

## 2024-10-23 DIAGNOSIS — F32.A ANXIETY AND DEPRESSION: ICD-10-CM

## 2024-10-23 DIAGNOSIS — I10 PRIMARY HYPERTENSION: ICD-10-CM

## 2024-10-23 LAB
ALBUMIN SERPL-MCNC: 4.7 G/DL (ref 3.5–5.2)
ALBUMIN/GLOB SERPL: 1.4 G/DL
ALP SERPL-CCNC: 112 U/L (ref 39–117)
ALT SERPL W P-5'-P-CCNC: 25 U/L (ref 1–41)
ANION GAP SERPL CALCULATED.3IONS-SCNC: 10 MMOL/L (ref 5–15)
AST SERPL-CCNC: 32 U/L (ref 1–40)
BILIRUB SERPL-MCNC: 0.3 MG/DL (ref 0–1.2)
BUN SERPL-MCNC: 27 MG/DL (ref 6–20)
BUN/CREAT SERPL: 19.1 (ref 7–25)
CALCIUM SPEC-SCNC: 9.8 MG/DL (ref 8.6–10.5)
CHLORIDE SERPL-SCNC: 99 MMOL/L (ref 98–107)
CHOLEST SERPL-MCNC: 123 MG/DL (ref 0–200)
CO2 SERPL-SCNC: 31 MMOL/L (ref 22–29)
CREAT SERPL-MCNC: 1.41 MG/DL (ref 0.76–1.27)
DEPRECATED RDW RBC AUTO: 43.9 FL (ref 37–54)
EGFRCR SERPLBLD CKD-EPI 2021: 58.1 ML/MIN/1.73
ERYTHROCYTE [DISTWIDTH] IN BLOOD BY AUTOMATED COUNT: 13.9 % (ref 12.3–15.4)
GLOBULIN UR ELPH-MCNC: 3.4 GM/DL
GLUCOSE SERPL-MCNC: 81 MG/DL (ref 65–99)
HBA1C MFR BLD: 5.4 % (ref 4.8–5.6)
HCT VFR BLD AUTO: 44.3 % (ref 37.5–51)
HDLC SERPL-MCNC: 48 MG/DL (ref 40–60)
HGB BLD-MCNC: 14.2 G/DL (ref 13–17.7)
LDLC SERPL CALC-MCNC: 64 MG/DL (ref 0–100)
LDLC/HDLC SERPL: 1.37 {RATIO}
MCH RBC QN AUTO: 27.6 PG (ref 26.6–33)
MCHC RBC AUTO-ENTMCNC: 32.1 G/DL (ref 31.5–35.7)
MCV RBC AUTO: 86.2 FL (ref 79–97)
PLATELET # BLD AUTO: 295 10*3/MM3 (ref 140–450)
PMV BLD AUTO: 8.9 FL (ref 6–12)
POTASSIUM SERPL-SCNC: 4.7 MMOL/L (ref 3.5–5.2)
PROT SERPL-MCNC: 8.1 G/DL (ref 6–8.5)
RBC # BLD AUTO: 5.14 10*6/MM3 (ref 4.14–5.8)
SODIUM SERPL-SCNC: 140 MMOL/L (ref 136–145)
TRIGL SERPL-MCNC: 46 MG/DL (ref 0–150)
TSH SERPL DL<=0.05 MIU/L-ACNC: 1.61 UIU/ML (ref 0.27–4.2)
URATE SERPL-MCNC: 6.6 MG/DL (ref 3.4–7)
VLDLC SERPL-MCNC: 11 MG/DL (ref 5–40)
WBC NRBC COR # BLD AUTO: 5.64 10*3/MM3 (ref 3.4–10.8)

## 2024-10-23 PROCEDURE — 84550 ASSAY OF BLOOD/URIC ACID: CPT

## 2024-10-23 PROCEDURE — G0439 PPPS, SUBSEQ VISIT: HCPCS | Performed by: NURSE PRACTITIONER

## 2024-10-23 PROCEDURE — 1159F MED LIST DOCD IN RCRD: CPT | Performed by: NURSE PRACTITIONER

## 2024-10-23 PROCEDURE — 83036 HEMOGLOBIN GLYCOSYLATED A1C: CPT

## 2024-10-23 PROCEDURE — 1160F RVW MEDS BY RX/DR IN RCRD: CPT | Performed by: NURSE PRACTITIONER

## 2024-10-23 PROCEDURE — 1170F FXNL STATUS ASSESSED: CPT | Performed by: NURSE PRACTITIONER

## 2024-10-23 PROCEDURE — 99214 OFFICE O/P EST MOD 30 MIN: CPT | Performed by: NURSE PRACTITIONER

## 2024-10-23 PROCEDURE — 84443 ASSAY THYROID STIM HORMONE: CPT

## 2024-10-23 PROCEDURE — 85027 COMPLETE CBC AUTOMATED: CPT

## 2024-10-23 PROCEDURE — 80053 COMPREHEN METABOLIC PANEL: CPT

## 2024-10-23 PROCEDURE — 96372 THER/PROPH/DIAG INJ SC/IM: CPT | Performed by: NURSE PRACTITIONER

## 2024-10-23 PROCEDURE — 80061 LIPID PANEL: CPT

## 2024-10-23 PROCEDURE — 3044F HG A1C LEVEL LT 7.0%: CPT | Performed by: NURSE PRACTITIONER

## 2024-10-23 PROCEDURE — 36415 COLL VENOUS BLD VENIPUNCTURE: CPT

## 2024-10-23 PROCEDURE — 3075F SYST BP GE 130 - 139MM HG: CPT | Performed by: NURSE PRACTITIONER

## 2024-10-23 PROCEDURE — 3079F DIAST BP 80-89 MM HG: CPT | Performed by: NURSE PRACTITIONER

## 2024-10-23 RX ORDER — METHYLPREDNISOLONE SODIUM SUCCINATE 40 MG/ML
40 INJECTION, POWDER, LYOPHILIZED, FOR SOLUTION INTRAMUSCULAR; INTRAVENOUS ONCE
Status: COMPLETED | OUTPATIENT
Start: 2024-10-23 | End: 2024-10-23

## 2024-10-23 RX ORDER — DESVENLAFAXINE 50 MG/1
50 TABLET, FILM COATED, EXTENDED RELEASE ORAL DAILY
Qty: 30 TABLET | Refills: 1 | Status: SHIPPED | OUTPATIENT
Start: 2024-10-23

## 2024-10-23 RX ORDER — BUSPIRONE HYDROCHLORIDE 10 MG/1
10 TABLET ORAL 3 TIMES DAILY
Qty: 90 TABLET | Refills: 1 | Status: SHIPPED | OUTPATIENT
Start: 2024-10-23

## 2024-10-23 RX ORDER — DULOXETIN HYDROCHLORIDE 30 MG/1
CAPSULE, DELAYED RELEASE ORAL
Qty: 11 CAPSULE | Refills: 0 | Status: SHIPPED | OUTPATIENT
Start: 2024-10-23

## 2024-10-23 RX ADMIN — METHYLPREDNISOLONE SODIUM SUCCINATE 40 MG: 40 INJECTION, POWDER, LYOPHILIZED, FOR SOLUTION INTRAMUSCULAR; INTRAVENOUS at 09:37

## 2024-10-23 NOTE — ASSESSMENT & PLAN NOTE
Diabetes is stable.   Continue current treatment regimen.  Diabetes will be reassessed at next appt. A1C ordered. Goal less than 7.0

## 2024-10-23 NOTE — PROGRESS NOTES
Subjective   The ABCs of the Annual Wellness Visit  Medicare Wellness Visit      Ricardo Menchaca Jr. is a 59 y.o. patient who presents for a Medicare Wellness Visit.    The following portions of the patient's history were reviewed and   updated as appropriate: allergies, current medications, past family history, past medical history, past social history, past surgical history, and problem list.    Compared to one year ago, the patient's physical   health is better.  Compared to one year ago, the patient's mental   health is worse.    Recent Hospitalizations:  He was not admitted to the hospital during the last year.     Current Medical Providers:  Patient Care Team:  Arturo Barahona DO as PCP - General (Internal Medicine)  Chente Elaine MD as Consulting Physician (Cardiology)    Outpatient Medications Prior to Visit   Medication Sig Dispense Refill    amitriptyline (ELAVIL) 10 MG tablet Take 1 tablet by mouth Every Night.      amlodipine-olmesartan (PACO) 10-40 MG per tablet Take 1 tablet by mouth Daily. 90 tablet 1    atorvastatin (LIPITOR) 20 MG tablet Take 1 tablet by mouth Daily. 90 tablet 1    Diclofenac Sodium (VOLTAREN) 1 % gel gel Apply  topically to the appropriate area as directed 4 (Four) Times a Day. 400 g 3    doxazosin (CARDURA) 8 MG tablet Take 1 tablet by mouth Every Night. 90 tablet 1    febuxostat (ULORIC) 80 MG tablet tablet Take 1 tablet by mouth Daily.      Semaglutide, 2 MG/DOSE, (Ozempic, 2 MG/DOSE,) 8 MG/3ML solution pen-injector Inject 2 mg under the skin into the appropriate area as directed 1 (One) Time Per Week. 9 mL 3    DULoxetine (CYMBALTA) 60 MG capsule Take 1 capsule by mouth Daily.       No facility-administered medications prior to visit.     No opioid medication identified on active medication list. I have reviewed chart for other potential  high risk medication/s and harmful drug interactions in the elderly.      Aspirin is not on active medication list.  Aspirin  "use is not indicated based on review of current medical condition/s. Risk of harm outweighs potential benefits.  .    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  ACP discussion was held with the patient during this visit. Patient does not have an advance directive, information provided.            Objective   Vitals:    10/23/24 0846   BP: 138/82   BP Location: Left arm   Patient Position: Sitting   Cuff Size: Other (Comment)   Pulse: 87   Resp: 16   Temp: 97.3 °F (36.3 °C)   TempSrc: Temporal   SpO2: 96%   Weight: 120 kg (265 lb)   Height: 180.3 cm (71\")       Estimated body mass index is 36.96 kg/m² as calculated from the following:    Height as of this encounter: 180.3 cm (71\").    Weight as of this encounter: 120 kg (265 lb).            Does the patient have evidence of cognitive impairment? No                                                                                                Health  Risk Assessment    Smoking Status:  Social History     Tobacco Use   Smoking Status Former    Current packs/day: 1.00    Average packs/day: 1 pack/day for 5.0 years (5.0 ttl pk-yrs)    Types: Cigarettes, Cigars    Passive exposure: Past   Smokeless Tobacco Never   Tobacco Comments    I stopped smoking 20 years ago     Alcohol Consumption:  Social History     Substance and Sexual Activity   Alcohol Use No       Fall Risk Screen  STEADI Fall Risk Assessment was completed, and " patient is at MODERATE risk for falls. Assessment completed on:10/23/2024    Depression Screenin/9/2024     8:26 AM   PHQ-2/PHQ-9 Depression Screening   Retired Little Interest or Pleasure in Doing Things 0-->not at all   Retired Feeling Down, Depressed or Hopeless 0-->not at all   Retired PHQ-9: Brief Depression Severity Measure Score 0     Health Habits and Functional and Cognitive Screening:      10/23/2024     8:47 AM   Functional & Cognitive Status   Do you have difficulty preparing food and eating? No   Do you have difficulty bathing yourself, getting dressed or grooming yourself? No   Do you have difficulty using the toilet? No   Do you have difficulty moving around from place to place? Yes   Do you have trouble with steps or getting out of a bed or a chair? Yes   Current Diet Well Balanced Diet   Dental Exam Up to date   Eye Exam Up to date   Exercise (times per week) 3 times per week   Current Exercises Include Aerobics   Do you need help using the phone?  No   Are you deaf or do you have serious difficulty hearing?  No   Do you need help to go to places out of walking distance? No   Do you need help shopping? Yes   Do you need help preparing meals?  Yes   Do you need help with housework?  Yes   Do you need help with laundry? Yes   Do you need help taking your medications? Yes   Do you need help managing money? No   Do you ever drive or ride in a car without wearing a seat belt? No   Have you felt unusual stress, anger or loneliness in the last month? Yes   Who do you live with? Spouse   If you need help, do you have trouble finding someone available to you? No   Have you been bothered in the last four weeks by sexual problems? Yes   Do you have difficulty concentrating, remembering or making decisions? Yes           Age-appropriate Screening Schedule:  Refer to the list below for future screening recommendations based on patient's age, sex and/or medical conditions. Orders for these recommended  tests are listed in the plan section. The patient has been provided with a written plan.    Health Maintenance List  Health Maintenance   Topic Date Due    URINE MICROALBUMIN  Never done    Hepatitis B (1 of 3 - 19+ 3-dose series) Never done    DIABETIC EYE EXAM  03/15/2024    INFLUENZA VACCINE  08/01/2024    COVID-19 Vaccine (5 - 2023-24 season) 09/01/2024    ANNUAL WELLNESS VISIT  09/20/2024    HEMOGLOBIN A1C  10/09/2024    LIPID PANEL  04/09/2025    BMI FOLLOWUP  04/09/2025    COLORECTAL CANCER SCREENING  10/19/2031    TDAP/TD VACCINES (2 - Td or Tdap) 03/28/2033    HEPATITIS C SCREENING  Completed    Pneumococcal Vaccine 0-64  Completed    ZOSTER VACCINE  Completed    DIABETIC FOOT EXAM  Discontinued                                                                                                                                                CMS Preventative Services Quick Reference  Risk Factors Identified During Encounter  Immunizations Discussed/Encouraged: Influenza and COVID19  Dental Screening Recommended  Vision Screening Recommended    The above risks/problems have been discussed with the patient.  Pertinent information has been shared with the patient in the After Visit Summary.  An After Visit Summary and PPPS were made available to the patient.    Follow Up:   Next Medicare Wellness visit to be scheduled in 1 year.         Additional E&M Note during same encounter follows:  Patient has additional, significant, and separately identifiable condition(s)/problem(s) that require work above and beyond the Medicare Wellness Visit     Chief Complaint  Medicare Wellness-subsequent, gout flare right ankle, and depression and anxiety    Subjective   HPI    Ricardo is also being seen today for an annual adult preventative physical exam.  and Ricardo is also being seen today for additional medical problem/s.      He is experiencing significant anxiety and depression. Despite taking Cymbalta for the past 4 years, he  "feels it is not providing much relief. He reports no suicidal ideation. He has previously tried Celexa and Zoloft, which were ineffective. He has also taken Wellbutrin without benefit. He has not tried Prozac, Viibryd, or Pristiq. Has not been on buspar either.     He is currently dealing with gout, which affects his ankle and knee. He takes Uloric daily and uses colchicine as needed, which provides some relief. He has been experiencing symptoms for about 7 days. Has previously received steroid injections which have helped.       Review of Systems   Respiratory: Negative.     Cardiovascular: Negative.    Gastrointestinal: Negative.    Genitourinary: Negative.    Neurological: Negative.           Objective   Vital Signs:  /82 (BP Location: Left arm, Patient Position: Sitting, Cuff Size: Other (Comment))   Pulse 87   Temp 97.3 °F (36.3 °C) (Temporal)   Resp 16   Ht 180.3 cm (71\")   Wt 120 kg (265 lb)   SpO2 96%   BMI 36.96 kg/m²   Physical Exam  Vitals and nursing note reviewed.   Constitutional:       Appearance: Normal appearance. He is normal weight.   HENT:      Mouth/Throat:      Mouth: Mucous membranes are moist.   Cardiovascular:      Rate and Rhythm: Normal rate and regular rhythm.      Pulses: Normal pulses.      Heart sounds: Normal heart sounds. No murmur heard.     No friction rub. No gallop.   Pulmonary:      Effort: Pulmonary effort is normal. No respiratory distress.      Breath sounds: Normal breath sounds. No wheezing.   Musculoskeletal:         General: Normal range of motion.      Cervical back: Normal range of motion and neck supple.   Skin:     General: Skin is warm and dry.      Capillary Refill: Capillary refill takes less than 2 seconds.   Neurological:      General: No focal deficit present.      Mental Status: He is alert and oriented to person, place, and time.   Psychiatric:         Mood and Affect: Mood normal.         Behavior: Behavior normal.         Thought Content: " Thought content normal.         Judgment: Judgment normal.         The following data was reviewed by: SUKI Cardona on 10/23/2024:        Assessment and Plan Additional age appropriate preventative wellness advice topics were discussed during today's preventative wellness exam(some topics already addressed during AWV portion of the note above):    Physical Activity: Advised cardiovascular activity 150 minutes per week as tolerated. (example brisk walk for 30 minutes, 5 days a week).     Nutrition: Discussed nutrition plan with patient. Information shared in after visit summary. Goal is for a well balanced diet to enhance overall health.     Injury Prevention Discussion:  Information shared in after visit summary.       Medicare annual wellness visit, subsequent    Acute gout of right ankle, unspecified cause  Uric acid level ordered.  Will treat with steroids today.  Reassess at next visit  Type 2 diabetes mellitus without complication, without long-term current use of insulin  Diabetes is stable.   Continue current treatment regimen.  Diabetes will be reassessed at next appt. A1C ordered. Goal less than 7.0  Primary hypertension  Hypertension is stable and controlled  Continue current treatment regimen.  Blood pressure will be reassessed in 6 months.  Screening, lipid  LDL goal less than 70. Will reassess at next visit.   Anxiety and depression  Patient's depression is a recurrent episode that is moderate without psychosis. Depression is active and worsening.    Plan:   Continue current medication therapy   Will wean off Cymbalta.  Decrease to 30 mg daily x 1 week then every other day x 1 week then stop.  Start Pristiq.  Has responded better to SNRIs and SSRIs.  Will also start as needed BuSpar.  Discussed potential side effects.  Discussed should he have suicidal or homicidal ideation he needs to stop taking these meds and seek emergency care.  Plan to reassess in 4 weeks    Followup in 4 weeks.      Orders Placed This Encounter   Procedures    CBC (No Diff)     Standing Status:   Future     Number of Occurrences:   1     Standing Expiration Date:   10/23/2025     Order Specific Question:   Release to patient     Answer:   Routine Release [[id number]]    Comprehensive Metabolic Panel     Standing Status:   Future     Number of Occurrences:   1     Standing Expiration Date:   10/23/2025     Order Specific Question:   Release to patient     Answer:   Routine Release [[id number]]    Hemoglobin A1c     Standing Status:   Future     Number of Occurrences:   1     Standing Expiration Date:   10/23/2025     Order Specific Question:   Release to patient     Answer:   Routine Release [[id number]]    Lipid Panel     Standing Status:   Future     Number of Occurrences:   1     Standing Expiration Date:   10/23/2025     Order Specific Question:   Release to patient     Answer:   Routine Release [[id number]]    TSH     Standing Status:   Future     Number of Occurrences:   1     Standing Expiration Date:   10/23/2025     Order Specific Question:   Release to patient     Answer:   Routine Release [[id number]]    Microalbumin / Creatinine Urine Ratio - Urine, Clean Catch     Standing Status:   Future     Number of Occurrences:   1     Standing Expiration Date:   10/23/2025     Order Specific Question:   Release to patient     Answer:   Routine Release [[id number]]    Uric acid     Standing Status:   Future     Number of Occurrences:   1     Standing Expiration Date:   10/23/2025     Order Specific Question:   Release to patient     Answer:   Routine Release [[id number]]     New Medications Ordered This Visit   Medications    methylPREDNISolone sodium succinate (SOLU-Medrol) injection 40 mg    DULoxetine (CYMBALTA) 30 MG capsule     Sig: Reduce from 60 mg to 30 mg daily x 7 days then decrease to every other day x 7 days then stop     Dispense:  11 capsule     Refill:  0    desvenlafaxine (Pristiq) 50 MG 24 hr tablet      Sig: Take 1 tablet by mouth Daily.     Dispense:  30 tablet     Refill:  1    busPIRone (BUSPAR) 10 MG tablet     Sig: Take 1 tablet by mouth 3 (Three) Times a Day.     Dispense:  90 tablet     Refill:  1        I spent 30 minutes caring for Ricardo on this date of service. This time includes time spent by me in the following activities:preparing for the visit, reviewing tests, obtaining and/or reviewing a separately obtained history, performing a medically appropriate examination and/or evaluation , counseling and educating the patient/family/caregiver, ordering medications, tests, or procedures, documenting information in the medical record, and independently interpreting results and communicating that information with the patient/family/caregiver  Follow Up   Return in about 4 weeks (around 11/20/2024) for Recheck follow up depression/anxiety .  Patient was given instructions and counseling regarding his condition or for health maintenance advice. Please see specific information pulled into the AVS if appropriate.    Patient or patient representative verbalized consent for the use of Ambient Listening during the visit with  SUKI Cardona for chart documentation. 10/23/2024  09:49 CDT

## 2024-10-23 NOTE — PATIENT INSTRUCTIONS
Decrease cymbalta from 60 mg to 30 mg daily for 7 days then every other day for 7 days then stop. I have sent in the 30 mg dose. Start pristiq. Start buspar as needed.     Medicare Wellness  Personal Prevention Plan of Service     Date of Office Visit:    Encounter Provider:  SUKI Cardona  Place of Service:  [facility] INTER[facility] MED[facility]  Patient Name: Ricardo Menchaca Jr.  :  1965    As part of the Medicare Wellness portion of your visit today, we are providing you with this personalized preventive plan of services (PPPS). This plan is based upon recommendations of the [place] Preventive Services Task Force (USPSTF) and the Advisory Committee on Immunization Practices (ACIP).    This lists the preventive care services that should be considered, and provides dates of when you are due. Items listed as completed are up-to-date and do not require any further intervention.    Health Maintenance   Topic Date Due    URINE MICROALBUMIN  Never done    Hepatitis B (1 of 3 - 19+ 3-dose series) Never done    DIABETIC EYE EXAM  03/15/2024    INFLUENZA VACCINE  2024    COVID-19 Vaccine ( - -24 season) 2024    ANNUAL WELLNESS VISIT  2024    HEMOGLOBIN A1C  10/09/2024    LIPID PANEL  2025    BMI FOLLOWUP  2025    COLORECTAL CANCER SCREENING  10/19/2031    TDAP/TD VACCINES (2 - Td or Tdap) 2033    HEPATITIS C SCREENING  Completed    Pneumococcal Vaccine 0-64  Completed    ZOSTER VACCINE  Completed    DIABETIC FOOT EXAM  Discontinued       No orders of the defined types were placed in this encounter.      No follow-ups on file.

## 2024-11-27 DIAGNOSIS — I10 ESSENTIAL HYPERTENSION: ICD-10-CM

## 2024-11-27 RX ORDER — AMLODIPINE AND OLMESARTAN MEDOXOMIL 10; 40 MG/1; MG/1
1 TABLET ORAL DAILY
Qty: 90 TABLET | Refills: 3 | Status: SHIPPED | OUTPATIENT
Start: 2024-11-27

## 2024-11-27 NOTE — TELEPHONE ENCOUNTER
Rx Refill Note  Requested Prescriptions     Pending Prescriptions Disp Refills    amlodipine-olmesartan (PACO) 10-40 MG per tablet [Pharmacy Med Name: amLODIPine-Olmesartan 10-40 MG Oral Tablet] 90 tablet 0     Sig: Take 1 tablet by mouth once daily      Last office visit with prescribing clinician: 10/23/2024   Last telemedicine visit with prescribing clinician: Visit date not found   Next office visit with prescribing clinician: Visit date not found                         Would you like a call back once the refill request has been completed: [] Yes [] No    If the office needs to give you a call back, can they leave a voicemail: [] Yes [] No    Lucas Ledezma MA  11/27/24, 09:54 CST

## 2024-12-05 ENCOUNTER — OFFICE VISIT (OUTPATIENT)
Dept: INTERNAL MEDICINE | Facility: CLINIC | Age: 59
End: 2024-12-05
Payer: MEDICARE

## 2024-12-05 VITALS
HEIGHT: 71 IN | SYSTOLIC BLOOD PRESSURE: 130 MMHG | WEIGHT: 265.4 LBS | RESPIRATION RATE: 16 BRPM | OXYGEN SATURATION: 98 % | BODY MASS INDEX: 37.15 KG/M2 | HEART RATE: 84 BPM | DIASTOLIC BLOOD PRESSURE: 81 MMHG

## 2024-12-05 DIAGNOSIS — F41.9 ANXIETY AND DEPRESSION: Primary | ICD-10-CM

## 2024-12-05 DIAGNOSIS — I10 PRIMARY HYPERTENSION: ICD-10-CM

## 2024-12-05 DIAGNOSIS — E66.812 CLASS 2 SEVERE OBESITY DUE TO EXCESS CALORIES WITH SERIOUS COMORBIDITY AND BODY MASS INDEX (BMI) OF 37.0 TO 37.9 IN ADULT: ICD-10-CM

## 2024-12-05 DIAGNOSIS — F32.A ANXIETY AND DEPRESSION: Primary | ICD-10-CM

## 2024-12-05 DIAGNOSIS — E11.9 TYPE 2 DIABETES MELLITUS WITHOUT COMPLICATION, WITHOUT LONG-TERM CURRENT USE OF INSULIN: ICD-10-CM

## 2024-12-05 DIAGNOSIS — Z12.11 SCREENING FOR COLORECTAL CANCER: ICD-10-CM

## 2024-12-05 DIAGNOSIS — E66.01 CLASS 2 SEVERE OBESITY DUE TO EXCESS CALORIES WITH SERIOUS COMORBIDITY AND BODY MASS INDEX (BMI) OF 37.0 TO 37.9 IN ADULT: ICD-10-CM

## 2024-12-05 DIAGNOSIS — Z12.12 SCREENING FOR COLORECTAL CANCER: ICD-10-CM

## 2024-12-05 PROCEDURE — 1159F MED LIST DOCD IN RCRD: CPT | Performed by: INTERNAL MEDICINE

## 2024-12-05 PROCEDURE — 3075F SYST BP GE 130 - 139MM HG: CPT | Performed by: INTERNAL MEDICINE

## 2024-12-05 PROCEDURE — 1160F RVW MEDS BY RX/DR IN RCRD: CPT | Performed by: INTERNAL MEDICINE

## 2024-12-05 PROCEDURE — 3079F DIAST BP 80-89 MM HG: CPT | Performed by: INTERNAL MEDICINE

## 2024-12-05 PROCEDURE — 3044F HG A1C LEVEL LT 7.0%: CPT | Performed by: INTERNAL MEDICINE

## 2024-12-05 PROCEDURE — G2211 COMPLEX E/M VISIT ADD ON: HCPCS | Performed by: INTERNAL MEDICINE

## 2024-12-05 PROCEDURE — 99214 OFFICE O/P EST MOD 30 MIN: CPT | Performed by: INTERNAL MEDICINE

## 2024-12-05 RX ORDER — DESVENLAFAXINE 50 MG/1
50 TABLET, FILM COATED, EXTENDED RELEASE ORAL DAILY
Qty: 90 TABLET | Refills: 1 | Status: SHIPPED | OUTPATIENT
Start: 2024-12-05

## 2024-12-05 RX ORDER — COLCHICINE 0.6 MG/1
0.6 TABLET ORAL AS NEEDED
COMMUNITY

## 2024-12-05 NOTE — PROGRESS NOTES
CC: f/u depression    History:  Ricardo Menchaca Jr. is a 59 y.o. male   History of Present Illness  The patient presents for evaluation of depression.    He reports an improvement in his depressive symptoms since his last visit, attributing this to the effectiveness of Pristiq 50 mg. He expresses satisfaction with his current treatment plan and wishes to continue it. His interest in activities has increased, although he still experiences days of disinterest. He also reports occasional feelings of sadness, depression, or hopelessness.    He is making efforts to manage his weight and has discontinued the use of Cymbalta.  PHQ-9 Depression Screening  Little interest or pleasure in doing things? Several days   Feeling down, depressed, or hopeless? Several days   PHQ-2 Total Score 2   Trouble falling or staying asleep, or sleeping too much? Almost all   Feeling tired or having little energy? Almost all   Poor appetite or overeating? Several days   Feeling bad about yourself - or that you are a failure or have let yourself or your family down? Not at all   Trouble concentrating on things, such as reading the newspaper or watching television? Over half   Moving or speaking so slowly that other people could have noticed? Or the opposite - being so fidgety or restless that you have been moving around a lot more than usual? Not at all   Thoughts that you would be better off dead, or of hurting yourself in some way? Not at all   PHQ-9 Total Score 11   If you checked off any problems, how difficult have these problems made it for you to do your work, take care of things at home, or get along with other people? Somewhat difficult          ROS:  Review of Systems   Neurological:  Negative for dizziness.   Psychiatric/Behavioral:  Positive for confusion, dysphoric mood and sleep disturbance. The patient is not nervous/anxious.         reports that he has quit smoking. His smoking use included cigarettes and cigars. He has a  "5 pack-year smoking history. He has been exposed to tobacco smoke. He has never used smokeless tobacco. He reports that he does not drink alcohol and does not use drugs.      Current Outpatient Medications:     amitriptyline (ELAVIL) 10 MG tablet, Take 1 tablet by mouth Every Night., Disp: , Rfl:     amlodipine-olmesartan (PACO) 10-40 MG per tablet, Take 1 tablet by mouth Daily., Disp: 90 tablet, Rfl: 3    atorvastatin (LIPITOR) 20 MG tablet, Take 1 tablet by mouth Daily., Disp: 90 tablet, Rfl: 1    busPIRone (BUSPAR) 10 MG tablet, Take 1 tablet by mouth 3 (Three) Times a Day., Disp: 90 tablet, Rfl: 1    colchicine 0.6 MG tablet, Take 1 tablet by mouth As Needed for Muscle / Joint Pain., Disp: , Rfl:     desvenlafaxine (Pristiq) 50 MG 24 hr tablet, Take 1 tablet by mouth Daily., Disp: 30 tablet, Rfl: 1    Diclofenac Sodium (VOLTAREN) 1 % gel gel, Apply  topically to the appropriate area as directed 4 (Four) Times a Day., Disp: 400 g, Rfl: 3    doxazosin (CARDURA) 8 MG tablet, Take 1 tablet by mouth Every Night., Disp: 90 tablet, Rfl: 1    DULoxetine (CYMBALTA) 30 MG capsule, Reduce from 60 mg to 30 mg daily x 7 days then decrease to every other day x 7 days then stop, Disp: 11 capsule, Rfl: 0    febuxostat (ULORIC) 80 MG tablet tablet, Take 1 tablet by mouth Daily., Disp: , Rfl:     Semaglutide, 2 MG/DOSE, (Ozempic, 2 MG/DOSE,) 8 MG/3ML solution pen-injector, Inject 2 mg under the skin into the appropriate area as directed 1 (One) Time Per Week., Disp: 9 mL, Rfl: 3    OBJECTIVE:  /81 (BP Location: Left arm, Patient Position: Sitting, Cuff Size: Adult)   Pulse 84   Resp 16   Ht 180.3 cm (71\")   Wt 120 kg (265 lb 6.4 oz)   SpO2 98%   BMI 37.02 kg/m²    Physical Exam  Constitutional:       General: He is not in acute distress.  Pulmonary:      Effort: Pulmonary effort is normal. No respiratory distress.   Neurological:      Mental Status: He is alert and oriented to person, place, and time.   Psychiatric:   "       Mood and Affect: Mood normal.         Behavior: Behavior normal.           Assessment/Plan     Diagnoses and all orders for this visit:    1. Anxiety and depression (Primary)  -     desvenlafaxine (Pristiq) 50 MG 24 hr tablet; Take 1 tablet by mouth Daily.  Dispense: 90 tablet; Refill: 1  Much improved with PHQ9 19-->11 since last visit. Continue Pristiq and he prefers no change to his dose.     2. Primary hypertension  Well controlled, BP goal for age is <140/90 per JNC 8 guidelines, and continue current medications    3. Type 2 diabetes mellitus without complication, without long-term current use of insulin  A1c well controlled. Continue current anti-hyperglycemics.     4. Class 2 severe obesity due to excess calories with serious comorbidity and body mass index (BMI) of 37.0 to 37.9 in adult   Much improved with efforts, losing ~30 pounds in the last 6 months. Continue current diet and lifestyle modification.     5. Screening for colorectal cancer  -     Ambulatory Referral For Screening Colonoscopy  Referral for repeat as he had stool burden with last colonoscopy.     An After Visit Summary was printed and given to the patient at discharge.  Return in about 4 months (around 4/5/2025) for Recheck.      Patient or patient representative verbalized consent for the use of Ambient Listening during the visit with  Arturo Barahona DO for chart documentation. 12/5/2024  09:09 KAMI Barahona D.O. 12/5/2024   Electronically signed.

## 2025-01-21 ENCOUNTER — HOSPITAL ENCOUNTER (EMERGENCY)
Age: 60
Discharge: HOME | End: 2025-01-21
Payer: MEDICARE

## 2025-01-21 ENCOUNTER — APPOINTMENT (OUTPATIENT)
Dept: CT IMAGING | Age: 60
End: 2025-01-21
Payer: MEDICARE

## 2025-01-21 VITALS
WEIGHT: 265 LBS | SYSTOLIC BLOOD PRESSURE: 155 MMHG | DIASTOLIC BLOOD PRESSURE: 99 MMHG | BODY MASS INDEX: 37.1 KG/M2 | RESPIRATION RATE: 17 BRPM | OXYGEN SATURATION: 98 % | HEIGHT: 71 IN | HEART RATE: 89 BPM | TEMPERATURE: 98 F

## 2025-01-21 DIAGNOSIS — S09.90XA INJURY OF HEAD, INITIAL ENCOUNTER: Primary | ICD-10-CM

## 2025-01-21 DIAGNOSIS — S16.1XXA ACUTE STRAIN OF NECK MUSCLE, INITIAL ENCOUNTER: ICD-10-CM

## 2025-01-21 PROCEDURE — 96372 THER/PROPH/DIAG INJ SC/IM: CPT

## 2025-01-21 PROCEDURE — 6360000002 HC RX W HCPCS: Performed by: NURSE PRACTITIONER

## 2025-01-21 PROCEDURE — 72125 CT NECK SPINE W/O DYE: CPT

## 2025-01-21 PROCEDURE — 70450 CT HEAD/BRAIN W/O DYE: CPT

## 2025-01-21 PROCEDURE — 99284 EMERGENCY DEPT VISIT MOD MDM: CPT

## 2025-01-21 RX ORDER — ORPHENADRINE CITRATE 30 MG/ML
60 INJECTION INTRAMUSCULAR; INTRAVENOUS ONCE
Status: COMPLETED | OUTPATIENT
Start: 2025-01-21 | End: 2025-01-21

## 2025-01-21 RX ADMIN — ORPHENADRINE CITRATE 60 MG: 30 INJECTION INTRAMUSCULAR; INTRAVENOUS at 10:58

## 2025-01-21 ASSESSMENT — PAIN DESCRIPTION - LOCATION: LOCATION: NECK

## 2025-01-21 ASSESSMENT — ENCOUNTER SYMPTOMS
DIARRHEA: 0
SHORTNESS OF BREATH: 0
NAUSEA: 0
ABDOMINAL PAIN: 0
BACK PAIN: 1
COUGH: 0
VOMITING: 0

## 2025-01-21 ASSESSMENT — PAIN - FUNCTIONAL ASSESSMENT: PAIN_FUNCTIONAL_ASSESSMENT: 0-10

## 2025-01-21 ASSESSMENT — PAIN SCALES - GENERAL
PAINLEVEL_OUTOF10: 7
PAINLEVEL_OUTOF10: 10

## 2025-01-21 NOTE — ED PROVIDER NOTES
[facility] EMERGENCY DEPARTMENT  EMERGENCY DEPARTMENT ENCOUNTER      Pt Name: Aidan Toledo  MRN: [id number]  Birthdate 1965  Date of evaluation: 1/21/2025  Provider: WILMAN Trujillo CNP  2:35 PM    CHIEF COMPLAINT       Chief Complaint   Patient presents with    Fall     X3 days, fell down steps          HISTORY OF PRESENT ILLNESS    Aidan Toledo is a 59 y.o. male who presents to the emergency department with concern for a fall a few days ago, he slid down the past few steps after tripping on his 11 yr old daughters stack of books on the steps. He hit back of his head on ground. He is here today mostly with neck pain to right side of neck but also with some continued headache. He is not on blood thinners. He has been using lidocaine patches and TENS unit that he has for chronic low back pain. He has no new numbness, tingling or weakness. No bowel or bladder dysfunction. No saddle anesthesia. Does report some frequency of voiding without discharge.     HPI    Nursing Notes were reviewed.    REVIEW OF SYSTEMS       Review of Systems   Constitutional:  Negative for chills and fever.   HENT:  Negative for congestion.    Respiratory:  Negative for cough and shortness of breath.    Cardiovascular:  Negative for chest pain and palpitations.   Gastrointestinal:  Negative for abdominal pain, diarrhea, nausea and vomiting.   Genitourinary:  Negative for dysuria, flank pain and urgency.   Musculoskeletal:  Positive for back pain and neck pain.   Neurological:  Positive for headaches. Negative for dizziness, syncope, facial asymmetry, weakness, light-headedness and numbness.             PAST MEDICAL HISTORY     Past Medical History:   Diagnosis Date    Anxiety     Arthritis     Chronic back pain     Controlled type 2 diabetes mellitus without complication, without long-term current use of insulin (HCC) 1/5/2017    Depression     Diabetes mellitus (HCC)     Gout     Hyperlipidemia     Hypertension     Obesity

## 2025-04-03 DIAGNOSIS — I10 PRIMARY HYPERTENSION: ICD-10-CM

## 2025-04-03 RX ORDER — DOXAZOSIN 8 MG/1
8 TABLET ORAL NIGHTLY
Qty: 90 TABLET | Refills: 1 | Status: SHIPPED | OUTPATIENT
Start: 2025-04-03

## 2025-04-03 NOTE — TELEPHONE ENCOUNTER
Rx Refill Note  Requested Prescriptions     Pending Prescriptions Disp Refills    doxazosin (CARDURA) 8 MG tablet [Pharmacy Med Name: Doxazosin Mesylate 8 MG Oral Tablet] 90 tablet 1     Sig: Take 1 tablet by mouth Every Night.      Last office visit with prescribing clinician: 10/23/2024   Last telemedicine visit with prescribing clinician: Visit date not found   Next office visit with prescribing clinician: Visit date not found                         Would you like a call back once the refill request has been completed: [] Yes [] No    If the office needs to give you a call back, can they leave a voicemail: [] Yes [] No    Lucas Ledezma MA  04/03/25, 08:40 CDT

## 2025-07-19 ENCOUNTER — OFFICE VISIT (OUTPATIENT)
Age: 60
End: 2025-07-19

## 2025-07-19 VITALS
BODY MASS INDEX: 39.89 KG/M2 | TEMPERATURE: 98.3 F | HEART RATE: 103 BPM | RESPIRATION RATE: 20 BRPM | DIASTOLIC BLOOD PRESSURE: 82 MMHG | WEIGHT: 286 LBS | SYSTOLIC BLOOD PRESSURE: 117 MMHG | OXYGEN SATURATION: 94 %

## 2025-07-19 DIAGNOSIS — L02.411 ABSCESS OF RIGHT AXILLA: Primary | ICD-10-CM

## 2025-07-19 DIAGNOSIS — R03.0 ELEVATED BLOOD PRESSURE READING: ICD-10-CM

## 2025-07-19 RX ORDER — DOXYCYCLINE HYCLATE 100 MG
100 TABLET ORAL 2 TIMES DAILY
Qty: 20 TABLET | Refills: 0 | Status: SHIPPED | OUTPATIENT
Start: 2025-07-19 | End: 2025-07-29

## 2025-07-19 NOTE — PATIENT INSTRUCTIONS
Abscess:  Doxycyline antibiotic prescribed; take the full course of antibiotics as prescribed.    Leave bandage on for 24 hours. After that, you may remove and leave off unless it's still draining, then apply clean bandage.     Avoid touching or scratching the area when possible.    Tylenol / ibuprofen for pain, unless contraindicated.     Monitor for signs of worsening infection:   ? Increasing redness, tenderness or warmth around the site   ? Unusual swelling around the site   ? Appearance of pus or purulent drainage   ? Fever     If you develop any of the above signs or symptoms of infection please return to clinic or follow up with your PCP     Elevated Blood Pressure:   - Your blood pressure was elevated during your visit today (greater than or equal to 130/80)  - Monitor your blood pressure at home daily. Keep a log.   - Increase your physical activity   - Follow DASH diet (fruits, vegetables, low-fat dairy, whole grains, poultry/fish)  - Limit alcohol consumption   - Monitor sodium intake  - Take all medication(s) as prescribed.   - Follow up with your PCP regarding elevated blood pressure readings today.   - Go to the ER for any chest pain, shortness of breath, dizziness/lightheadedness, palpitations, or vision changes.

## 2025-07-19 NOTE — PROGRESS NOTES
of 58.1 on 10/23/2024, opted to forego potential kidney damage. Return precautions discussed.     Elevated Blood Pressure:   Patient's blood pressure noted to be elevated in clinic today. First BP reading 134/92. Repeat BP reading noted to be 142/92. Patient does have a history of hypertension. Recommended patient follow up with PCP regarding elevated blood pressure readings. Advised patient to monitor blood pressure at home. Reviewed lifestyle modifications such as increasing physical activity, consuming DASH diet, monitoring salt intake, and limiting alcohol consumption.       Any condition can change, despite proper treatment. Therefore, if symptoms still persist or worsen after treatment plan intitated today, patient was advised to seek further medical evaluation from their PCP, ER, or urgent care. Urgent Care evaluations are not a substitute for PCP visits. Follow up care is the patient's responsibility to discuss and review this  visit.      Orders Placed This Encounter   Procedures    Culture, Aerobic and Anaerobic       No results found for this visit on 07/19/25.    Orders Placed This Encounter   Medications    doxycycline hyclate (VIBRA-TABS) 100 MG tablet     Sig: Take 1 tablet by mouth 2 times daily for 10 days     Dispense:  20 tablet     Refill:  0      New Prescriptions    DOXYCYCLINE HYCLATE (VIBRA-TABS) 100 MG TABLET    Take 1 tablet by mouth 2 times daily for 10 days        All questions were answered regarding evaluation, diagnosis, and treatment plan during today's visit. Discussed usage, benefits, and side effects of any administered or prescribed medications.     Patient and/or guardian was given educational information, verbalized understanding, and is in agreement with treatment plan and recommendations. Patient exited clinic in stable condition.        Patient Instructions   Abscess:  Doxycyline antibiotic prescribed; take the full course of antibiotics as prescribed.    Leave bandage on

## 2025-07-20 LAB
BACTERIA SPEC ANAEROBE CULT: ABNORMAL
BACTERIA SPEC ANAEROBE+AEROBE CULT: ABNORMAL
GRAM STN SPEC: ABNORMAL
ORGANISM: ABNORMAL

## 2025-07-21 SDOH — HEALTH STABILITY: PHYSICAL HEALTH: ON AVERAGE, HOW MANY DAYS PER WEEK DO YOU ENGAGE IN MODERATE TO STRENUOUS EXERCISE (LIKE A BRISK WALK)?: 3 DAYS

## 2025-07-21 SDOH — HEALTH STABILITY: PHYSICAL HEALTH: ON AVERAGE, HOW MANY MINUTES DO YOU ENGAGE IN EXERCISE AT THIS LEVEL?: 60 MIN

## 2025-07-24 ENCOUNTER — OFFICE VISIT (OUTPATIENT)
Dept: INTERNAL MEDICINE | Age: 60
End: 2025-07-24
Payer: MEDICARE

## 2025-07-24 VITALS
HEIGHT: 71 IN | HEART RATE: 85 BPM | WEIGHT: 288.2 LBS | BODY MASS INDEX: 40.35 KG/M2 | DIASTOLIC BLOOD PRESSURE: 86 MMHG | SYSTOLIC BLOOD PRESSURE: 122 MMHG | OXYGEN SATURATION: 96 %

## 2025-07-24 DIAGNOSIS — Z87.891 HISTORY OF TOBACCO ABUSE: ICD-10-CM

## 2025-07-24 DIAGNOSIS — Z87.891 PERSONAL HISTORY OF TOBACCO USE: ICD-10-CM

## 2025-07-24 DIAGNOSIS — F33.2 ENDOGENOUS DEPRESSION (HCC): ICD-10-CM

## 2025-07-24 DIAGNOSIS — M54.50 CHRONIC LUMBOSACRAL PAIN: ICD-10-CM

## 2025-07-24 DIAGNOSIS — G89.29 CHRONIC LUMBOSACRAL PAIN: ICD-10-CM

## 2025-07-24 DIAGNOSIS — E11.9 TYPE 2 DIABETES MELLITUS WITHOUT COMPLICATION, WITHOUT LONG-TERM CURRENT USE OF INSULIN (HCC): ICD-10-CM

## 2025-07-24 DIAGNOSIS — I10 PRIMARY HYPERTENSION: ICD-10-CM

## 2025-07-24 DIAGNOSIS — R94.4 DECREASED CALCULATED GFR: ICD-10-CM

## 2025-07-24 DIAGNOSIS — Z12.11 COLON CANCER SCREENING: Primary | ICD-10-CM

## 2025-07-24 DIAGNOSIS — F41.9 SEVERE ANXIETY: ICD-10-CM

## 2025-07-24 DIAGNOSIS — E79.0 HYPERURICEMIA: ICD-10-CM

## 2025-07-24 DIAGNOSIS — Z12.5 SCREENING PSA (PROSTATE SPECIFIC ANTIGEN): ICD-10-CM

## 2025-07-24 DIAGNOSIS — E55.9 VITAMIN D DEFICIENCY: ICD-10-CM

## 2025-07-24 DIAGNOSIS — E66.01 MORBID OBESITY (HCC): ICD-10-CM

## 2025-07-24 LAB
ANION GAP SERPL CALCULATED.3IONS-SCNC: 9 MMOL/L (ref 8–16)
BACTERIA SPEC ANAEROBE CULT: ABNORMAL
BACTERIA SPEC ANAEROBE+AEROBE CULT: ABNORMAL
BUN SERPL-MCNC: 18 MG/DL (ref 6–20)
CALCIUM SERPL-MCNC: 9.8 MG/DL (ref 8.6–10)
CHLORIDE SERPL-SCNC: 101 MMOL/L (ref 98–107)
CO2 SERPL-SCNC: 28 MMOL/L (ref 22–29)
CREAT SERPL-MCNC: 1.4 MG/DL (ref 0.7–1.2)
GLUCOSE SERPL-MCNC: 88 MG/DL (ref 70–99)
GRAM STN SPEC: ABNORMAL
ORGANISM: ABNORMAL
POTASSIUM SERPL-SCNC: 4.5 MMOL/L (ref 3.5–5.1)
SODIUM SERPL-SCNC: 138 MMOL/L (ref 136–145)

## 2025-07-24 PROCEDURE — 3079F DIAST BP 80-89 MM HG: CPT | Performed by: INTERNAL MEDICINE

## 2025-07-24 PROCEDURE — 99205 OFFICE O/P NEW HI 60 MIN: CPT | Performed by: INTERNAL MEDICINE

## 2025-07-24 PROCEDURE — 3074F SYST BP LT 130 MM HG: CPT | Performed by: INTERNAL MEDICINE

## 2025-07-24 PROCEDURE — G0296 VISIT TO DETERM LDCT ELIG: HCPCS | Performed by: INTERNAL MEDICINE

## 2025-07-24 RX ORDER — DULOXETIN HYDROCHLORIDE 60 MG/1
60 CAPSULE, DELAYED RELEASE ORAL 2 TIMES DAILY
COMMUNITY

## 2025-07-24 RX ORDER — LORAZEPAM 1 MG/1
1 TABLET ORAL 3 TIMES DAILY PRN
COMMUNITY
Start: 2025-03-17

## 2025-07-24 RX ORDER — FEBUXOSTAT 80 MG/1
40 TABLET, FILM COATED ORAL DAILY
COMMUNITY

## 2025-07-24 RX ORDER — SEMAGLUTIDE 2.68 MG/ML
2 INJECTION, SOLUTION SUBCUTANEOUS
COMMUNITY

## 2025-07-24 RX ORDER — ZOLPIDEM TARTRATE 10 MG/1
10 TABLET ORAL
COMMUNITY
Start: 2025-03-10

## 2025-07-24 RX ORDER — TRAZODONE HYDROCHLORIDE 100 MG/1
400 TABLET ORAL NIGHTLY
COMMUNITY
Start: 2024-10-25

## 2025-07-24 SDOH — ECONOMIC STABILITY: FOOD INSECURITY: WITHIN THE PAST 12 MONTHS, YOU WORRIED THAT YOUR FOOD WOULD RUN OUT BEFORE YOU GOT MONEY TO BUY MORE.: PATIENT DECLINED

## 2025-07-24 SDOH — ECONOMIC STABILITY: FOOD INSECURITY: WITHIN THE PAST 12 MONTHS, THE FOOD YOU BOUGHT JUST DIDN'T LAST AND YOU DIDN'T HAVE MONEY TO GET MORE.: PATIENT DECLINED

## 2025-07-24 ASSESSMENT — ENCOUNTER SYMPTOMS
CONSTIPATION: 0
SORE THROAT: 0
COUGH: 0
CHEST TIGHTNESS: 0
WHEEZING: 0
ABDOMINAL PAIN: 0
BACK PAIN: 1

## 2025-07-24 ASSESSMENT — PATIENT HEALTH QUESTIONNAIRE - PHQ9
1. LITTLE INTEREST OR PLEASURE IN DOING THINGS: NOT AT ALL
SUM OF ALL RESPONSES TO PHQ QUESTIONS 1-9: 2
SUM OF ALL RESPONSES TO PHQ QUESTIONS 1-9: 2
2. FEELING DOWN, DEPRESSED OR HOPELESS: MORE THAN HALF THE DAYS
SUM OF ALL RESPONSES TO PHQ QUESTIONS 1-9: 2
SUM OF ALL RESPONSES TO PHQ QUESTIONS 1-9: 2

## 2025-07-24 NOTE — PROGRESS NOTES
Chief Complaint   Patient presents with    New Patient     Scope done 6 years ago      History of presenting illness:  Aidan Toledo is a59 y.o. male who presents today for  NEW PT APPT    Together with the patient I have reviewed her past medical history, surgical history, her screening test timings and results, her family history, social history her medications and allergies to medications. Patient is signing medical record release to obtain medical records from her previous primary care physician and specialist physicians      Patient Active Problem List    Diagnosis Date Noted    Type 2 diabetes mellitus without complication, without long-term current use of insulin (HCC) 07/24/2025    Hyperuricemia 07/24/2025    History of tobacco abuse 07/24/2025    Morbid obesity (HCC) 07/24/2025    Gout 02/17/2012    HTN (hypertension) 02/17/2012    Lumbago 11/16/2011    Lumbosacral spondylosis without myelopathy 11/16/2011    Spinal stenosis, lumbar region, without neurogenic claudication 11/16/2011     Past Medical History:   Diagnosis Date    Anxiety     Arthritis     Chronic back pain     Controlled type 2 diabetes mellitus without complication, without long-term current use of insulin (HCC) 1/5/2017    Depression     Diabetes mellitus (HCC)     Gout     Hyperlipidemia     Hypertension     Obesity     Umbilical hernia       Past Surgical History:   Procedure Laterality Date    BACK SURGERY      COLONOSCOPY      LUMBAR DISC SURGERY  2008    OTHER SURGICAL HISTORY      cat scratch fever in left groin    NE RPR UMBILICAL HRNA 5 YRS/> REDUCIBLE N/A 1/27/2017    HERNIA UMBILICAL REPAIR WITH MESH  performed by Denisse Goldberg MD at [facility] OR    UPPER GASTROINTESTINAL ENDOSCOPY       Current Outpatient Medications   Medication Sig Dispense Refill    DULoxetine (CYMBALTA) 60 MG extended release capsule Take 1 capsule by mouth 2 times daily      semaglutide, 2 MG/DOSE, (OZEMPIC, 2 MG/DOSE,) 8 MG/3ML SOPN sc injection Inject

## 2025-08-11 LAB — NONINV COLON CA DNA+OCC BLD SCRN STL QL: NEGATIVE

## (undated) DEVICE — ADHESIVE SKIN CLSR 0.7ML TOP DERMBND ADV

## (undated) DEVICE — TIBURON DRAPE TOWELS: Brand: CONVERTORS

## (undated) DEVICE — SUTURE VCRL SZ 3-0 L27IN ABSRB UD L26MM SH 1/2 CIR J416H

## (undated) DEVICE — MEDI-VAC YANK SUCT HNDL W/TPRD BULBOUS TIP: Brand: CARDINAL HEALTH

## (undated) DEVICE — BINDER ABD H12IN COT FOR 45-62IN WAIST UNIV PREM 4 PNL DSGN

## (undated) DEVICE — TBG SMPL FLTR LINE NASL 02/C02 A/ BX/100

## (undated) DEVICE — MASK,OXYGEN,MED CONC,ADLT,7' TUB, UC: Brand: PENDING

## (undated) DEVICE — TIBURON LAPAROTOMY DRAPE: Brand: CONVERTORS

## (undated) DEVICE — Device: Brand: DEFENDO AIR/WATER/SUCTION AND BIOPSY VALVE

## (undated) DEVICE — SUTURE PROL SZ 0 L30IN NONABSORBABLE BLU L26MM CT-2 1/2 CIR 8412H

## (undated) DEVICE — MINOR CDS: Brand: MEDLINE INDUSTRIES, INC.

## (undated) DEVICE — YANKAUER,BULB TIP WITH VENT: Brand: ARGYLE

## (undated) DEVICE — SENSR O2 OXIMAX FNGR A/ 18IN NONSTR

## (undated) DEVICE — SUTURE MCRYL SZ 4-0 L18IN ABSRB UD L19MM PS-2 3/8 CIR PRIM Y496G

## (undated) DEVICE — STERILE LATEX POWDER FREE SURGICAL GLOVES WITH HYDROGEL COATING: Brand: PROTEXIS

## (undated) DEVICE — THE CHANNEL CLEANING BRUSH IS A NYLON FLEXI BRUSH ATTACHED TO A FLEXIBLE PLASTIC SHEATH DESIGNED TO SAFELY REMOVE DEBRIS FROM FLEXIBLE ENDOSCOPES.

## (undated) DEVICE — CUFF,BP,DISP,1 TUBE,ADULT,HP: Brand: MEDLINE